# Patient Record
Sex: FEMALE | Race: BLACK OR AFRICAN AMERICAN | NOT HISPANIC OR LATINO | Employment: OTHER | RURAL
[De-identification: names, ages, dates, MRNs, and addresses within clinical notes are randomized per-mention and may not be internally consistent; named-entity substitution may affect disease eponyms.]

---

## 2020-08-21 ENCOUNTER — HISTORICAL (OUTPATIENT)
Dept: ADMINISTRATIVE | Facility: HOSPITAL | Age: 65
End: 2020-08-21

## 2020-08-22 LAB
BACTERIA #/AREA URNS HPF: ABNORMAL /HPF
BILIRUB UR QL STRIP: NEGATIVE MG/DL
CLARITY UR: ABNORMAL
COLOR UR: YELLOW
GLUCOSE UR STRIP-MCNC: >=1000 MG/DL
KETONES UR STRIP-SCNC: NEGATIVE MG/DL
LEUKOCYTE ESTERASE UR QL STRIP: NEGATIVE LEU/UL
NITRITE UR QL STRIP: NEGATIVE
PH UR STRIP: 7.5 PH UNITS (ref 5–8)
PROT UR QL STRIP: NEGATIVE MG/DL
RBC # UR STRIP: ABNORMAL ERY/UL
RBC #/AREA URNS HPF: ABNORMAL /HPF (ref 0–3)
SP GR UR STRIP: 1.02 (ref 1–1.03)
SQUAMOUS #/AREA URNS LPF: ABNORMAL /LPF
UROBILINOGEN UR STRIP-ACNC: 0.2 EU/DL
WBC #/AREA URNS HPF: ABNORMAL /HPF (ref 0–5)
YEAST #/AREA URNS HPF: ABNORMAL /HPF

## 2020-08-24 LAB
REPORT: NORMAL

## 2021-10-13 DIAGNOSIS — M25.812 OTHER SPECIFIED JOINT DISORDERS, LEFT SHOULDER: Primary | ICD-10-CM

## 2021-10-19 ENCOUNTER — CLINICAL SUPPORT (OUTPATIENT)
Dept: REHABILITATION | Facility: HOSPITAL | Age: 66
End: 2021-10-19
Payer: COMMERCIAL

## 2021-10-19 DIAGNOSIS — M25.512 LEFT SHOULDER PAIN, UNSPECIFIED CHRONICITY: Primary | ICD-10-CM

## 2021-10-19 DIAGNOSIS — M25.612 DECREASED RANGE OF MOTION OF SHOULDER, LEFT: ICD-10-CM

## 2021-10-19 DIAGNOSIS — M25.812 OTHER SPECIFIED JOINT DISORDERS, LEFT SHOULDER: ICD-10-CM

## 2021-10-19 PROCEDURE — 97110 THERAPEUTIC EXERCISES: CPT | Mod: PN

## 2021-10-19 PROCEDURE — 97161 PT EVAL LOW COMPLEX 20 MIN: CPT | Mod: PN

## 2021-10-22 ENCOUNTER — CLINICAL SUPPORT (OUTPATIENT)
Dept: REHABILITATION | Facility: HOSPITAL | Age: 66
End: 2021-10-22
Payer: COMMERCIAL

## 2021-10-22 DIAGNOSIS — M25.612 DECREASED RANGE OF MOTION OF SHOULDER, LEFT: Primary | ICD-10-CM

## 2021-10-22 PROCEDURE — 97140 MANUAL THERAPY 1/> REGIONS: CPT | Mod: PN,CQ

## 2021-10-22 PROCEDURE — 97110 THERAPEUTIC EXERCISES: CPT | Mod: PN,CQ

## 2021-10-26 ENCOUNTER — CLINICAL SUPPORT (OUTPATIENT)
Dept: REHABILITATION | Facility: HOSPITAL | Age: 66
End: 2021-10-26
Payer: COMMERCIAL

## 2021-10-26 DIAGNOSIS — M25.612 DECREASED RANGE OF MOTION OF SHOULDER, LEFT: Primary | ICD-10-CM

## 2021-10-26 PROCEDURE — 97140 MANUAL THERAPY 1/> REGIONS: CPT | Mod: PN,CQ

## 2021-10-26 PROCEDURE — 97110 THERAPEUTIC EXERCISES: CPT | Mod: PN,CQ

## 2021-11-02 ENCOUNTER — CLINICAL SUPPORT (OUTPATIENT)
Dept: REHABILITATION | Facility: HOSPITAL | Age: 66
End: 2021-11-02
Payer: COMMERCIAL

## 2021-11-02 DIAGNOSIS — M25.612 DECREASED RANGE OF MOTION OF SHOULDER, LEFT: Primary | ICD-10-CM

## 2021-11-02 PROCEDURE — 97140 MANUAL THERAPY 1/> REGIONS: CPT | Mod: PN,CQ

## 2021-11-02 PROCEDURE — 97110 THERAPEUTIC EXERCISES: CPT | Mod: PN,CQ

## 2021-11-05 ENCOUNTER — CLINICAL SUPPORT (OUTPATIENT)
Dept: REHABILITATION | Facility: HOSPITAL | Age: 66
End: 2021-11-05
Payer: COMMERCIAL

## 2021-11-05 DIAGNOSIS — M25.612 DECREASED RANGE OF MOTION OF SHOULDER, LEFT: Primary | ICD-10-CM

## 2021-11-05 PROCEDURE — 97140 MANUAL THERAPY 1/> REGIONS: CPT | Mod: KX,PN,CQ

## 2021-11-05 PROCEDURE — 97110 THERAPEUTIC EXERCISES: CPT | Mod: KX,PN,CQ

## 2021-11-09 ENCOUNTER — CLINICAL SUPPORT (OUTPATIENT)
Dept: REHABILITATION | Facility: HOSPITAL | Age: 66
End: 2021-11-09
Payer: COMMERCIAL

## 2021-11-09 DIAGNOSIS — M25.512 LEFT SHOULDER PAIN, UNSPECIFIED CHRONICITY: Primary | ICD-10-CM

## 2021-11-09 DIAGNOSIS — M25.612 DECREASED RANGE OF MOTION OF SHOULDER, LEFT: ICD-10-CM

## 2021-11-09 PROCEDURE — 97110 THERAPEUTIC EXERCISES: CPT | Mod: PN

## 2023-01-09 ENCOUNTER — OFFICE VISIT (OUTPATIENT)
Dept: FAMILY MEDICINE | Facility: CLINIC | Age: 68
End: 2023-01-09
Payer: MEDICARE

## 2023-01-09 VITALS
HEART RATE: 91 BPM | OXYGEN SATURATION: 98 % | BODY MASS INDEX: 28.04 KG/M2 | HEIGHT: 68 IN | DIASTOLIC BLOOD PRESSURE: 80 MMHG | TEMPERATURE: 98 F | SYSTOLIC BLOOD PRESSURE: 124 MMHG | WEIGHT: 185 LBS | RESPIRATION RATE: 18 BRPM

## 2023-01-09 DIAGNOSIS — J11.1 INFLUENZA: Primary | ICD-10-CM

## 2023-01-09 DIAGNOSIS — R50.9 FEVER, UNSPECIFIED FEVER CAUSE: ICD-10-CM

## 2023-01-09 LAB
CTP QC/QA: YES
CTP QC/QA: YES
FLUAV AG NPH QL: NEGATIVE
FLUAV AG NPH QL: NEGATIVE
FLUBV AG NPH QL: POSITIVE
FLUBV AG NPH QL: POSITIVE
SARS-COV-2 AG RESP QL IA.RAPID: POSITIVE

## 2023-01-09 PROCEDURE — 3074F SYST BP LT 130 MM HG: CPT | Mod: ,,, | Performed by: NURSE PRACTITIONER

## 2023-01-09 PROCEDURE — 3079F PR MOST RECENT DIASTOLIC BLOOD PRESSURE 80-89 MM HG: ICD-10-PCS | Mod: ,,, | Performed by: NURSE PRACTITIONER

## 2023-01-09 PROCEDURE — 1101F PR PT FALLS ASSESS DOC 0-1 FALLS W/OUT INJ PAST YR: ICD-10-PCS | Mod: ,,, | Performed by: NURSE PRACTITIONER

## 2023-01-09 PROCEDURE — 3008F BODY MASS INDEX DOCD: CPT | Mod: ,,, | Performed by: NURSE PRACTITIONER

## 2023-01-09 PROCEDURE — 1159F MED LIST DOCD IN RCRD: CPT | Mod: ,,, | Performed by: NURSE PRACTITIONER

## 2023-01-09 PROCEDURE — 1159F PR MEDICATION LIST DOCUMENTED IN MEDICAL RECORD: ICD-10-PCS | Mod: ,,, | Performed by: NURSE PRACTITIONER

## 2023-01-09 PROCEDURE — 3008F PR BODY MASS INDEX (BMI) DOCUMENTED: ICD-10-PCS | Mod: ,,, | Performed by: NURSE PRACTITIONER

## 2023-01-09 PROCEDURE — 3288F FALL RISK ASSESSMENT DOCD: CPT | Mod: ,,, | Performed by: NURSE PRACTITIONER

## 2023-01-09 PROCEDURE — 87804 POCT INFLUENZA A/B: ICD-10-PCS | Mod: 59,QW,, | Performed by: NURSE PRACTITIONER

## 2023-01-09 PROCEDURE — 3074F PR MOST RECENT SYSTOLIC BLOOD PRESSURE < 130 MM HG: ICD-10-PCS | Mod: ,,, | Performed by: NURSE PRACTITIONER

## 2023-01-09 PROCEDURE — 1160F PR REVIEW ALL MEDS BY PRESCRIBER/CLIN PHARMACIST DOCUMENTED: ICD-10-PCS | Mod: ,,, | Performed by: NURSE PRACTITIONER

## 2023-01-09 PROCEDURE — 3079F DIAST BP 80-89 MM HG: CPT | Mod: ,,, | Performed by: NURSE PRACTITIONER

## 2023-01-09 PROCEDURE — 3288F PR FALLS RISK ASSESSMENT DOCUMENTED: ICD-10-PCS | Mod: ,,, | Performed by: NURSE PRACTITIONER

## 2023-01-09 PROCEDURE — 99203 OFFICE O/P NEW LOW 30 MIN: CPT | Mod: ,,, | Performed by: NURSE PRACTITIONER

## 2023-01-09 PROCEDURE — 87804 INFLUENZA ASSAY W/OPTIC: CPT | Mod: 59,QW,, | Performed by: NURSE PRACTITIONER

## 2023-01-09 PROCEDURE — 99203 PR OFFICE/OUTPT VISIT, NEW, LEVL III, 30-44 MIN: ICD-10-PCS | Mod: ,,, | Performed by: NURSE PRACTITIONER

## 2023-01-09 PROCEDURE — 1101F PT FALLS ASSESS-DOCD LE1/YR: CPT | Mod: ,,, | Performed by: NURSE PRACTITIONER

## 2023-01-09 PROCEDURE — 1160F RVW MEDS BY RX/DR IN RCRD: CPT | Mod: ,,, | Performed by: NURSE PRACTITIONER

## 2023-01-09 RX ORDER — PREGABALIN 100 MG/1
100 CAPSULE ORAL DAILY
COMMUNITY
End: 2024-03-12 | Stop reason: SDUPTHER

## 2023-01-09 RX ORDER — ROSUVASTATIN CALCIUM 10 MG/1
10 TABLET, COATED ORAL NIGHTLY
COMMUNITY
End: 2024-03-25 | Stop reason: SDUPTHER

## 2023-01-09 RX ORDER — BIMATOPROST 0.1 MG/ML
1 SOLUTION/ DROPS OPHTHALMIC NIGHTLY
COMMUNITY
Start: 2022-08-17

## 2023-01-09 RX ORDER — POTASSIUM CHLORIDE 20 MEQ/1
20 TABLET, EXTENDED RELEASE ORAL DAILY
COMMUNITY
End: 2024-03-27 | Stop reason: SDUPTHER

## 2023-01-09 RX ORDER — CYCLOBENZAPRINE HCL 10 MG
10 TABLET ORAL 3 TIMES DAILY PRN
COMMUNITY

## 2023-01-09 RX ORDER — PANTOPRAZOLE SODIUM 40 MG/1
40 TABLET, DELAYED RELEASE ORAL DAILY
COMMUNITY
End: 2024-03-25 | Stop reason: SDUPTHER

## 2023-01-09 RX ORDER — LEVOTHYROXINE SODIUM 100 UG/1
100 TABLET ORAL
COMMUNITY
End: 2024-03-25 | Stop reason: SDUPTHER

## 2023-01-09 RX ORDER — ATENOLOL AND CHLORTHALIDONE TABLET 50; 25 MG/1; MG/1
1 TABLET ORAL DAILY
COMMUNITY
End: 2024-03-25

## 2023-01-09 RX ORDER — AMILORIDE HYDROCHLORIDE 5 MG/1
5 TABLET ORAL DAILY
COMMUNITY
End: 2024-03-25 | Stop reason: SDUPTHER

## 2023-01-09 RX ORDER — GLIMEPIRIDE 2 MG/1
2 TABLET ORAL EVERY MORNING
COMMUNITY
End: 2024-03-25 | Stop reason: SDUPTHER

## 2023-01-09 RX ORDER — OSELTAMIVIR PHOSPHATE 75 MG/1
75 CAPSULE ORAL DAILY
Qty: 5 CAPSULE | Refills: 0 | Status: SHIPPED | OUTPATIENT
Start: 2023-01-09 | End: 2023-01-14

## 2023-01-09 RX ORDER — VALSARTAN 160 MG/1
160 TABLET ORAL NIGHTLY
COMMUNITY
End: 2024-03-25 | Stop reason: SDUPTHER

## 2023-01-09 NOTE — PROGRESS NOTES
ROSIE Chamorro   [place] [id number]  [place]  [address]      PATIENT NAME: Amina Israel  : 1955  DATE: 23  MRN: [id number]      Billing Provider: ROSIE Chamorro  Level of Service:   Patient PCP Information       Provider PCP Type    Marquez Burch MD General            Reason for Visit / Chief Complaint: Cough, Headache, Sore Throat (All symptoms X4 days), Fever (101), and Emesis (X2 yesterday from coughing)       Update PCP  Update Chief Complaint         History of Present Illness / Problem Focused Workflow     Amina Israel presents to the clinic with Cough, Headache, Sore Throat (All symptoms X4 days), Fever (101), and Emesis (X2 yesterday from coughing)     Patient presents to clinic with c/o cough, congestion, fever, chills x 2-3 days.       Review of Systems     @Review of Systems   Constitutional:  Positive for fatigue and fever.   HENT:  Positive for nasal congestion, postnasal drip, rhinorrhea and sinus pressure/congestion. Negative for ear pain.    Respiratory:  Negative for cough.    Cardiovascular:  Negative for chest pain.   Neurological:  Positive for headaches.     Medical / Social / Family History     Past Medical History:   Diagnosis Date    Diabetes mellitus, type 2     GERD (gastroesophageal reflux disease)     History of thyroid cancer 2005    Hyperlipidemia     Hypertension     Hypothyroidism     Neuropathy     Spinal stenosis        Past Surgical History:   Procedure Laterality Date    CERVICAL DISCECTOMY  2005    C2-3    FOOT SURGERY Bilateral 2001    HYSTERECTOMY  2003    POLYPECTOMY      REDUCTION OF BOTH BREASTS Bilateral 2006    TONSILLECTOMY  1974       Social History  Ms.  reports that she has never smoked. She has never used smokeless tobacco. She reports that she does not currently use alcohol. She reports that she does not use drugs.    Family History  Ms.'s family history is not on file.    Medications and Allergies     Medications  Outpatient  Medications Marked as Taking for the 1/9/23 encounter (Office Visit) with ROSIE Chamorro   Medication Sig Dispense Refill    aMILoride (MIDAMOR) 5 MG Tab Take 5 mg by mouth once daily.      atenoloL-chlorthalidone (TENORETIC) 50-25 mg Tab Take 1 tablet by mouth once daily.      cyclobenzaprine (FLEXERIL) 10 MG tablet Take 10 mg by mouth 3 (three) times daily as needed for Muscle spasms.      estrogens, conjugated, (PREMARIN) 1.25 MG tablet Take 1.25 mg by mouth once daily.      glimepiride (AMARYL) 2 MG tablet Take 2 mg by mouth every morning.      levothyroxine (SYNTHROID) 100 MCG tablet Take 100 mcg by mouth before breakfast.      LUMIGAN 0.01 % Drop Place 1 drop into both eyes every evening.      pantoprazole (PROTONIX) 40 MG tablet Take 40 mg by mouth once daily.      potassium chloride SA (K-DUR,KLOR-CON) 20 MEQ tablet Take 20 mEq by mouth once daily.      pregabalin (LYRICA) 100 MG capsule Take 100 mg by mouth Daily.      rosuvastatin (CRESTOR) 10 MG tablet Take 10 mg by mouth every evening.      valsartan (DIOVAN) 160 MG tablet Take 160 mg by mouth every evening.         Allergies  Review of patient's allergies indicates:   Allergen Reactions    Phenergan plain Nausea And Vomiting       Physical Examination     Vitals:    01/09/23 0902   BP: 124/80   Pulse: 91   Resp: 18   Temp: 97.5 °F (36.4 °C)     Physical Exam  Constitutional:       General: She is not in acute distress.     Appearance: Normal appearance.   HENT:      Right Ear: Tympanic membrane is bulging.      Left Ear: Tympanic membrane is bulging.      Mouth/Throat:      Pharynx: Posterior oropharyngeal erythema present.   Cardiovascular:      Rate and Rhythm: Normal rate and regular rhythm.   Pulmonary:      Effort: Pulmonary effort is normal. No respiratory distress.      Breath sounds: Normal breath sounds. No wheezing, rhonchi or rales.   Skin:     General: Skin is warm and dry.   Neurological:      Mental Status: She is alert.   Psychiatric:          Mood and Affect: Mood normal.         Behavior: Behavior normal.             No results found for: WBC, HGB, HCT, MCV, PLT       No results found for: NA, K, CL, CO2, GLU, BUN, CREATININE, CALCIUM, PROT, ALBUMIN, BILITOT, ALKPHOS, AST, ALT, ANIONGAP, ESTGFRAFRICA, EGFRNONAA   No image results found.     Procedures   Assessment and Plan (including Health Maintenance)      Problem List  Smart Sets  Document Outside HM   :    Plan:           Problem List Items Addressed This Visit          ID    Influenza - Primary    Relevant Medications    oseltamivir (TAMIFLU) 75 MG capsule     Other Visit Diagnoses       Fever, unspecified fever cause        Relevant Orders    POCT SARS-COV2 (COVID) with Flu Antigen (Completed)    POCT Influenza A/B (Completed)            The patient has no Health Maintenance topics of status Not Due    No future appointments.     Health Maintenance Due   Topic Date Due    Hepatitis C Screening  Never done    Lipid Panel  Never done    COVID-19 Vaccine (1) Never done    HIV Screening  Never done    TETANUS VACCINE  Never done    Mammogram  Never done    DEXA Scan  Never done    Colorectal Cancer Screening  Never done    Shingles Vaccine (1 of 2) Never done    Pneumococcal Vaccines (Age 65+) (1 - PCV) Never done    Influenza Vaccine (1) Never done        Follow up if symptoms worsen or fail to improve.     Signature:  ROSIE Chamorro  [facility]  [address] [address] [place]  [address]    Date of encounter: 1/9/23

## 2023-01-10 PROBLEM — J11.1 INFLUENZA: Status: ACTIVE | Noted: 2023-01-10

## 2024-03-12 ENCOUNTER — OFFICE VISIT (OUTPATIENT)
Dept: FAMILY MEDICINE | Facility: CLINIC | Age: 69
End: 2024-03-12
Payer: MEDICARE

## 2024-03-12 VITALS
BODY MASS INDEX: 28.41 KG/M2 | DIASTOLIC BLOOD PRESSURE: 83 MMHG | RESPIRATION RATE: 18 BRPM | SYSTOLIC BLOOD PRESSURE: 149 MMHG | TEMPERATURE: 98 F | HEART RATE: 75 BPM | HEIGHT: 67 IN | WEIGHT: 181 LBS | OXYGEN SATURATION: 99 %

## 2024-03-12 DIAGNOSIS — I10 HYPERTENSION, UNSPECIFIED TYPE: Primary | ICD-10-CM

## 2024-03-12 DIAGNOSIS — R21 RASH: ICD-10-CM

## 2024-03-12 DIAGNOSIS — G89.29 OTHER CHRONIC PAIN: ICD-10-CM

## 2024-03-12 DIAGNOSIS — E11.9 DM (DIABETES MELLITUS): ICD-10-CM

## 2024-03-12 DIAGNOSIS — Z12.4 PAP SMEAR FOR CERVICAL CANCER SCREENING: ICD-10-CM

## 2024-03-12 LAB
ANION GAP SERPL CALCULATED.3IONS-SCNC: 10 MMOL/L (ref 7–16)
BACTERIA #/AREA URNS HPF: ABNORMAL /HPF
BILIRUB UR QL STRIP: NEGATIVE
BUN SERPL-MCNC: 17 MG/DL (ref 7–18)
BUN/CREAT SERPL: 18 (ref 6–20)
CALCIUM SERPL-MCNC: 10 MG/DL (ref 8.5–10.1)
CHLORIDE SERPL-SCNC: 101 MMOL/L (ref 98–107)
CHOLEST SERPL-MCNC: 120 MG/DL (ref 0–200)
CHOLEST/HDLC SERPL: 2.3 {RATIO}
CLARITY UR: CLEAR
CO2 SERPL-SCNC: 31 MMOL/L (ref 21–32)
COLOR UR: ABNORMAL
CREAT SERPL-MCNC: 0.97 MG/DL (ref 0.55–1.02)
CREAT UR-MCNC: 95 MG/DL (ref 28–219)
EGFR (NO RACE VARIABLE) (RUSH/TITUS): 64 ML/MIN/1.73M2
GLUCOSE SERPL-MCNC: 85 MG/DL (ref 74–106)
GLUCOSE UR STRIP-MCNC: >1000 MG/DL
HDLC SERPL-MCNC: 53 MG/DL (ref 40–60)
KETONES UR STRIP-SCNC: NEGATIVE MG/DL
LDLC SERPL CALC-MCNC: 51 MG/DL
LDLC/HDLC SERPL: 1 {RATIO}
LEUKOCYTE ESTERASE UR QL STRIP: NEGATIVE
MICROALBUMIN UR-MCNC: 2.1 MG/DL (ref 0–2.8)
MICROALBUMIN/CREAT RATIO PNL UR: 22.1 MG/G (ref 0–30)
NITRITE UR QL STRIP: NEGATIVE
NONHDLC SERPL-MCNC: 67 MG/DL
PH UR STRIP: 7 PH UNITS
POTASSIUM SERPL-SCNC: 2.8 MMOL/L (ref 3.5–5.1)
PROT UR QL STRIP: NEGATIVE
PROT UR-MCNC: 19.7 MG/DL (ref 0–11.9)
RBC # UR STRIP: ABNORMAL /UL
RBC #/AREA URNS HPF: 10 /HPF
SODIUM SERPL-SCNC: 139 MMOL/L (ref 136–145)
SP GR UR STRIP: 1.02
SQUAMOUS #/AREA URNS LPF: ABNORMAL /HPF
TRIGL SERPL-MCNC: 79 MG/DL (ref 35–150)
TSH SERPL DL<=0.005 MIU/L-ACNC: 0.72 UIU/ML (ref 0.36–3.74)
UROBILINOGEN UR STRIP-ACNC: NORMAL MG/DL
VLDLC SERPL-MCNC: 16 MG/DL
WBC #/AREA URNS HPF: <1 /HPF

## 2024-03-12 PROCEDURE — 82570 ASSAY OF URINE CREATININE: CPT | Mod: ,,, | Performed by: CLINICAL MEDICAL LABORATORY

## 2024-03-12 PROCEDURE — 1159F MED LIST DOCD IN RCRD: CPT | Mod: ,,, | Performed by: INTERNAL MEDICINE

## 2024-03-12 PROCEDURE — 3008F BODY MASS INDEX DOCD: CPT | Mod: ,,, | Performed by: INTERNAL MEDICINE

## 2024-03-12 PROCEDURE — 1101F PT FALLS ASSESS-DOCD LE1/YR: CPT | Mod: ,,, | Performed by: INTERNAL MEDICINE

## 2024-03-12 PROCEDURE — 99214 OFFICE O/P EST MOD 30 MIN: CPT | Mod: ,,, | Performed by: INTERNAL MEDICINE

## 2024-03-12 PROCEDURE — 81001 URINALYSIS AUTO W/SCOPE: CPT | Mod: ,,, | Performed by: CLINICAL MEDICAL LABORATORY

## 2024-03-12 PROCEDURE — 3288F FALL RISK ASSESSMENT DOCD: CPT | Mod: ,,, | Performed by: INTERNAL MEDICINE

## 2024-03-12 PROCEDURE — 83036 HEMOGLOBIN GLYCOSYLATED A1C: CPT | Mod: ,,, | Performed by: CLINICAL MEDICAL LABORATORY

## 2024-03-12 PROCEDURE — 1126F AMNT PAIN NOTED NONE PRSNT: CPT | Mod: ,,, | Performed by: INTERNAL MEDICINE

## 2024-03-12 PROCEDURE — 3077F SYST BP >= 140 MM HG: CPT | Mod: ,,, | Performed by: INTERNAL MEDICINE

## 2024-03-12 PROCEDURE — 80048 BASIC METABOLIC PNL TOTAL CA: CPT | Mod: ,,, | Performed by: CLINICAL MEDICAL LABORATORY

## 2024-03-12 PROCEDURE — 82043 UR ALBUMIN QUANTITATIVE: CPT | Mod: ,,, | Performed by: CLINICAL MEDICAL LABORATORY

## 2024-03-12 PROCEDURE — 3079F DIAST BP 80-89 MM HG: CPT | Mod: ,,, | Performed by: INTERNAL MEDICINE

## 2024-03-12 PROCEDURE — 3044F HG A1C LEVEL LT 7.0%: CPT | Mod: ,,, | Performed by: INTERNAL MEDICINE

## 2024-03-12 PROCEDURE — 4010F ACE/ARB THERAPY RXD/TAKEN: CPT | Mod: ,,, | Performed by: INTERNAL MEDICINE

## 2024-03-12 PROCEDURE — 3066F NEPHROPATHY DOC TX: CPT | Mod: ,,, | Performed by: INTERNAL MEDICINE

## 2024-03-12 PROCEDURE — 84443 ASSAY THYROID STIM HORMONE: CPT | Mod: ,,, | Performed by: CLINICAL MEDICAL LABORATORY

## 2024-03-12 PROCEDURE — 84156 ASSAY OF PROTEIN URINE: CPT | Mod: ,,, | Performed by: CLINICAL MEDICAL LABORATORY

## 2024-03-12 PROCEDURE — 3061F NEG MICROALBUMINURIA REV: CPT | Mod: ,,, | Performed by: INTERNAL MEDICINE

## 2024-03-12 PROCEDURE — 80061 LIPID PANEL: CPT | Mod: ,,, | Performed by: CLINICAL MEDICAL LABORATORY

## 2024-03-12 RX ORDER — OXYBUTYNIN CHLORIDE 5 MG/1
5 TABLET ORAL NIGHTLY
Qty: 60 TABLET | Refills: 2 | Status: SHIPPED | OUTPATIENT
Start: 2024-03-12

## 2024-03-12 RX ORDER — PREGABALIN 100 MG/1
100 CAPSULE ORAL 2 TIMES DAILY
Qty: 60 CAPSULE | Refills: 1 | Status: SHIPPED | OUTPATIENT
Start: 2024-03-12 | End: 2024-03-25 | Stop reason: SDUPTHER

## 2024-03-13 LAB
EST. AVERAGE GLUCOSE BLD GHB EST-MCNC: 123 MG/DL
HBA1C MFR BLD HPLC: 5.9 % (ref 4.5–6.6)

## 2024-03-14 PROBLEM — R21 RASH: Status: ACTIVE | Noted: 2024-03-14

## 2024-03-14 PROBLEM — Z12.4 PAP SMEAR FOR CERVICAL CANCER SCREENING: Status: ACTIVE | Noted: 2024-03-14

## 2024-03-14 PROBLEM — G89.29 OTHER CHRONIC PAIN: Status: ACTIVE | Noted: 2024-03-14

## 2024-03-14 PROBLEM — E11.9 DM (DIABETES MELLITUS): Status: ACTIVE | Noted: 2024-03-14

## 2024-03-14 PROBLEM — I10 HYPERTENSION: Status: ACTIVE | Noted: 2024-03-14

## 2024-03-14 NOTE — PROGRESS NOTES
Subjective:       Patient ID: Amina Israel is a 69 y.o. female.    Chief Complaint: Establish Care    HPI  .  Patient presents today to establish care she has multiple complaints of multiple medical problems she is chronic pain syndrome she is hypertension she has chronic diabetes chronic severe diabetic neuropathy and peripheral neuropathy patient has hypothyroidism also she smokes tobacco she complains of a rash to left arm she also wants a referral to pain management she also complains of urinary incontinence.Due to the many adverse health risks of smoking tobacco,  Patient has been encouraged to quit smoking, and smoking sensation treatments have been   treatments have been offered and a  smoking cessation class has been recommended to the patient      Current Medications:    Current Outpatient Medications:     aMILoride (MIDAMOR) 5 MG Tab, Take 5 mg by mouth once daily., Disp: , Rfl:     atenoloL-chlorthalidone (TENORETIC) 50-25 mg Tab, Take 1 tablet by mouth once daily., Disp: , Rfl:     cyclobenzaprine (FLEXERIL) 10 MG tablet, Take 10 mg by mouth 3 (three) times daily as needed for Muscle spasms., Disp: , Rfl:     estrogens, conjugated, (PREMARIN) 1.25 MG tablet, Take 1.25 mg by mouth once daily., Disp: , Rfl:     glimepiride (AMARYL) 2 MG tablet, Take 2 mg by mouth every morning., Disp: , Rfl:     levothyroxine (SYNTHROID) 100 MCG tablet, Take 100 mcg by mouth before breakfast., Disp: , Rfl:     LUMIGAN 0.01 % Drop, Place 1 drop into both eyes every evening., Disp: , Rfl:     pantoprazole (PROTONIX) 40 MG tablet, Take 40 mg by mouth once daily., Disp: , Rfl:     potassium chloride SA (K-DUR,KLOR-CON) 20 MEQ tablet, Take 20 mEq by mouth once daily., Disp: , Rfl:     rosuvastatin (CRESTOR) 10 MG tablet, Take 10 mg by mouth every evening., Disp: , Rfl:     valsartan (DIOVAN) 160 MG tablet, Take 160 mg by mouth every evening., Disp: , Rfl:     oxybutynin (DITROPAN) 5 MG Tab, Take 1 tablet (5 mg total) by mouth  "every evening., Disp: 60 tablet, Rfl: 2    pregabalin (LYRICA) 100 MG capsule, Take 1 capsule (100 mg total) by mouth 2 (two) times daily., Disp: 60 capsule, Rfl: 1           Review of Systems             Vitals:    03/12/24 1342 03/12/24 1343   BP: (!) 145/84 (!) 149/83   BP Location: Right arm    Patient Position: Sitting    BP Method: Large (Automatic)    Pulse: 75    Resp: 18    Temp: 97.8 °F (36.6 °C)    TempSrc: Tympanic    SpO2: 99%    Weight: 82.1 kg (181 lb)    Height: 5' 7" (1.702 m)         Physical Exam  Vitals and nursing note reviewed.   Constitutional:       Appearance: Normal appearance.   Cardiovascular:      Rate and Rhythm: Normal rate and regular rhythm.      Pulses: Normal pulses.      Heart sounds: Normal heart sounds.   Pulmonary:      Effort: Pulmonary effort is normal.      Breath sounds: Normal breath sounds.   Abdominal:      General: Abdomen is flat. Bowel sounds are normal.      Palpations: Abdomen is soft.   Musculoskeletal:         General: Normal range of motion.   Skin:     General: Skin is warm and dry.   Neurological:      General: No focal deficit present.      Mental Status: She is alert and oriented to person, place, and time. Mental status is at baseline.           Last Labs:     Office Visit on 03/12/2024   Component Date Value    Color, UA 03/12/2024 Light-Yellow     Clarity, UA 03/12/2024 Clear     pH, UA 03/12/2024 7.0     Leukocytes, UA 03/12/2024 Negative     Nitrites, UA 03/12/2024 Negative     Protein, UA 03/12/2024 Negative     Glucose, UA 03/12/2024 >1000 (A)     Ketones, UA 03/12/2024 Negative     Urobilinogen, UA 03/12/2024 Normal     Bilirubin, UA 03/12/2024 Negative     Blood, UA 03/12/2024 Trace (A)     Specific [place], UA 03/12/2024 1.025     Sodium 03/12/2024 139     Potassium 03/12/2024 2.8 (L)     Chloride 03/12/2024 101     CO2 03/12/2024 31     Anion Gap 03/12/2024 10     Glucose 03/12/2024 85     BUN 03/12/2024 17     Creatinine 03/12/2024 0.97     " BUN/Creatinine Ratio 03/12/2024 18     Calcium 03/12/2024 10.0     eGFR 03/12/2024 64     TSH 03/12/2024 0.723     Protein, Urine 03/12/2024 19.7 (H)     Creatinine, Urine 03/12/2024 95     Microalbumin 03/12/2024 2.1     Microalbumin/Creatinine * 03/12/2024 22.1     Hemoglobin A1C 03/12/2024 5.9     Estimated Average Glucose 03/12/2024 123     Triglycerides 03/12/2024 79     Cholesterol 03/12/2024 120     HDL Cholesterol 03/12/2024 53     Cholesterol/HDL Ratio (R* 03/12/2024 2.3     Non-HDL 03/12/2024 67     LDL Calculated 03/12/2024 51     LDL/HDL 03/12/2024 1.0     VLDL 03/12/2024 16     WBC, UA 03/12/2024 <1     RBC, UA 03/12/2024 10 (H)     Bacteria, UA 03/12/2024 Occ (A)     Squamous Epithelial Cell* 03/12/2024 Occasional (A)        Last Imaging:  No image results found.         **Labs and x-rays personally reviewed by me    ** reviewed      Objective:        Assessment:       1. Hypertension, unspecified type  Basic Metabolic Panel    TSH    Protein, Random Urine    Microalbumin/Creatinine Ratio, Urine    Hemoglobin A1C    Lipid Panel    Urinalysis, Reflex to Urine Culture    Basic Metabolic Panel    TSH    Protein, Random Urine    Microalbumin/Creatinine Ratio, Urine    Hemoglobin A1C    Lipid Panel    Urinalysis, Microscopic      2. DM (diabetes mellitus)  Hemoglobin A1C    Hemoglobin A1C      3. Rash  Ambulatory referral/consult to Dermatology      4. Other chronic pain  Ambulatory referral/consult to Pain Clinic      5. Pap smear for cervical cancer screening  Ambulatory referral/consult to Gynecology           Plan:         1. Hypertension, unspecified type  -     Basic Metabolic Panel; Future; Expected date: 03/12/2024  -     TSH; Future; Expected date: 03/12/2024  -     Protein, Random Urine; Future; Expected date: 03/12/2024  -     Microalbumin/Creatinine Ratio, Urine; Future; Expected date: 03/12/2024  -     Hemoglobin A1C; Future; Expected date: 03/12/2024  -     Lipid Panel; Future; Expected  date: 03/12/2024  -     Urinalysis, Reflex to Urine Culture  -     Urinalysis, Microscopic    2. DM (diabetes mellitus)  -     Hemoglobin A1C; Future; Expected date: 03/12/2024    3. Rash  -     Ambulatory referral/consult to Dermatology; Future; Expected date: 03/19/2024    4. Other chronic pain  -     Ambulatory referral/consult to Pain Clinic; Future; Expected date: 03/19/2024    5. Pap smear for cervical cancer screening  -     Ambulatory referral/consult to Gynecology; Future; Expected date: 03/19/2024    Other orders  -     oxybutynin (DITROPAN) 5 MG Tab; Take 1 tablet (5 mg total) by mouth every evening.  Dispense: 60 tablet; Refill: 2  -     pregabalin (LYRICA) 100 MG capsule; Take 1 capsule (100 mg total) by mouth 2 (two) times daily.  Dispense: 60 capsule; Refill: 1

## 2024-03-19 ENCOUNTER — TELEPHONE (OUTPATIENT)
Dept: FAMILY MEDICINE | Facility: CLINIC | Age: 69
End: 2024-03-19
Payer: MEDICARE

## 2024-03-19 NOTE — TELEPHONE ENCOUNTER
----- Message from Obed Durant MD sent at 3/14/2024 11:12 AM CDT -----  Need to see in  1 week please  abnl results     1027 Pt is scheduled to come in on 03/25/24

## 2024-03-25 ENCOUNTER — OFFICE VISIT (OUTPATIENT)
Dept: FAMILY MEDICINE | Facility: CLINIC | Age: 69
End: 2024-03-25
Payer: MEDICARE

## 2024-03-25 VITALS
OXYGEN SATURATION: 98 % | TEMPERATURE: 97 F | BODY MASS INDEX: 33.49 KG/M2 | WEIGHT: 182 LBS | DIASTOLIC BLOOD PRESSURE: 83 MMHG | HEIGHT: 62 IN | RESPIRATION RATE: 18 BRPM | SYSTOLIC BLOOD PRESSURE: 128 MMHG | HEART RATE: 78 BPM

## 2024-03-25 DIAGNOSIS — R80.9 PROTEINURIA, UNSPECIFIED TYPE: ICD-10-CM

## 2024-03-25 DIAGNOSIS — Z12.31 ENCOUNTER FOR SCREENING MAMMOGRAM FOR MALIGNANT NEOPLASM OF BREAST: ICD-10-CM

## 2024-03-25 DIAGNOSIS — E11.9 TYPE 2 DIABETES MELLITUS WITHOUT COMPLICATION, UNSPECIFIED WHETHER LONG TERM INSULIN USE: ICD-10-CM

## 2024-03-25 DIAGNOSIS — Z12.39 ENCOUNTER FOR SCREENING FOR MALIGNANT NEOPLASM OF BREAST, UNSPECIFIED SCREENING MODALITY: ICD-10-CM

## 2024-03-25 DIAGNOSIS — Z78.0 POSTMENOPAUSE: ICD-10-CM

## 2024-03-25 DIAGNOSIS — Z12.11 ENCOUNTER FOR SCREENING COLONOSCOPY: ICD-10-CM

## 2024-03-25 DIAGNOSIS — E87.6 HYPOKALEMIA: Primary | ICD-10-CM

## 2024-03-25 LAB — POTASSIUM SERPL-SCNC: 3.3 MMOL/L (ref 3.5–5.1)

## 2024-03-25 PROCEDURE — 3044F HG A1C LEVEL LT 7.0%: CPT | Mod: ,,, | Performed by: INTERNAL MEDICINE

## 2024-03-25 PROCEDURE — 3066F NEPHROPATHY DOC TX: CPT | Mod: ,,, | Performed by: INTERNAL MEDICINE

## 2024-03-25 PROCEDURE — 1125F AMNT PAIN NOTED PAIN PRSNT: CPT | Mod: ,,, | Performed by: INTERNAL MEDICINE

## 2024-03-25 PROCEDURE — 99214 OFFICE O/P EST MOD 30 MIN: CPT | Mod: ,,, | Performed by: INTERNAL MEDICINE

## 2024-03-25 PROCEDURE — 4010F ACE/ARB THERAPY RXD/TAKEN: CPT | Mod: ,,, | Performed by: INTERNAL MEDICINE

## 2024-03-25 PROCEDURE — 3288F FALL RISK ASSESSMENT DOCD: CPT | Mod: ,,, | Performed by: INTERNAL MEDICINE

## 2024-03-25 PROCEDURE — 3079F DIAST BP 80-89 MM HG: CPT | Mod: ,,, | Performed by: INTERNAL MEDICINE

## 2024-03-25 PROCEDURE — 3008F BODY MASS INDEX DOCD: CPT | Mod: ,,, | Performed by: INTERNAL MEDICINE

## 2024-03-25 PROCEDURE — 3061F NEG MICROALBUMINURIA REV: CPT | Mod: ,,, | Performed by: INTERNAL MEDICINE

## 2024-03-25 PROCEDURE — 1101F PT FALLS ASSESS-DOCD LE1/YR: CPT | Mod: ,,, | Performed by: INTERNAL MEDICINE

## 2024-03-25 PROCEDURE — 1159F MED LIST DOCD IN RCRD: CPT | Mod: ,,, | Performed by: INTERNAL MEDICINE

## 2024-03-25 PROCEDURE — 84132 ASSAY OF SERUM POTASSIUM: CPT | Mod: ,,, | Performed by: CLINICAL MEDICAL LABORATORY

## 2024-03-25 PROCEDURE — 3074F SYST BP LT 130 MM HG: CPT | Mod: ,,, | Performed by: INTERNAL MEDICINE

## 2024-03-25 RX ORDER — FLUCONAZOLE 150 MG/1
150 TABLET ORAL
COMMUNITY
Start: 2024-03-15 | End: 2024-03-25 | Stop reason: SDUPTHER

## 2024-03-25 RX ORDER — EMPAGLIFLOZIN 25 MG/1
25 TABLET, FILM COATED ORAL
COMMUNITY
Start: 2024-02-23 | End: 2024-03-25

## 2024-03-25 RX ORDER — ATENOLOL AND CHLORTHALIDONE TABLET 50; 25 MG/1; MG/1
1 TABLET ORAL DAILY
Qty: 90 TABLET | Refills: 1 | Status: CANCELLED | OUTPATIENT
Start: 2024-03-25

## 2024-03-27 DIAGNOSIS — E87.6 HYPOKALEMIA: Primary | ICD-10-CM

## 2024-03-27 PROBLEM — R80.9 PROTEINURIA: Status: ACTIVE | Noted: 2024-03-27

## 2024-03-27 PROBLEM — Z12.11 ENCOUNTER FOR SCREENING COLONOSCOPY: Status: ACTIVE | Noted: 2024-03-14

## 2024-03-27 PROBLEM — Z12.39 ENCOUNTER FOR SCREENING FOR MALIGNANT NEOPLASM OF BREAST: Status: ACTIVE | Noted: 2024-03-14

## 2024-03-27 RX ORDER — FLUCONAZOLE 200 MG/1
200 TABLET ORAL DAILY
Qty: 7 TABLET | Refills: 1 | Status: SHIPPED | OUTPATIENT
Start: 2024-03-27 | End: 2024-04-17

## 2024-03-27 RX ORDER — PANTOPRAZOLE SODIUM 40 MG/1
40 TABLET, DELAYED RELEASE ORAL DAILY
Qty: 90 TABLET | Refills: 1 | Status: SHIPPED | OUTPATIENT
Start: 2024-03-27

## 2024-03-27 RX ORDER — POTASSIUM CHLORIDE 20 MEQ/1
20 TABLET, EXTENDED RELEASE ORAL 3 TIMES DAILY
Qty: 6 TABLET | Refills: 0 | Status: SHIPPED | OUTPATIENT
Start: 2024-03-27

## 2024-03-27 RX ORDER — SULFAMETHOXAZOLE AND TRIMETHOPRIM 800; 160 MG/1; MG/1
1 TABLET ORAL 2 TIMES DAILY
Qty: 14 TABLET | Refills: 1 | Status: SHIPPED | OUTPATIENT
Start: 2024-03-27

## 2024-03-27 RX ORDER — VALSARTAN 160 MG/1
160 TABLET ORAL NIGHTLY
Qty: 90 TABLET | Refills: 1 | Status: SHIPPED | OUTPATIENT
Start: 2024-03-27

## 2024-03-27 RX ORDER — GLIMEPIRIDE 2 MG/1
2 TABLET ORAL EVERY MORNING
Qty: 90 TABLET | Refills: 1 | Status: SHIPPED | OUTPATIENT
Start: 2024-03-27 | End: 2024-04-22 | Stop reason: SDUPTHER

## 2024-03-27 RX ORDER — AMILORIDE HYDROCHLORIDE 5 MG/1
5 TABLET ORAL DAILY
Qty: 30 TABLET | Refills: 1 | Status: SHIPPED | OUTPATIENT
Start: 2024-03-27

## 2024-03-27 RX ORDER — ROSUVASTATIN CALCIUM 10 MG/1
10 TABLET, COATED ORAL NIGHTLY
Qty: 90 TABLET | Refills: 1 | Status: SHIPPED | OUTPATIENT
Start: 2024-03-27 | End: 2024-04-22 | Stop reason: SDUPTHER

## 2024-03-27 RX ORDER — LEVOTHYROXINE SODIUM 100 UG/1
100 TABLET ORAL
Qty: 30 TABLET | Refills: 1 | Status: SHIPPED | OUTPATIENT
Start: 2024-03-27

## 2024-03-27 RX ORDER — PREGABALIN 100 MG/1
100 CAPSULE ORAL 2 TIMES DAILY
Qty: 60 CAPSULE | Refills: 1 | Status: SHIPPED | OUTPATIENT
Start: 2024-03-27

## 2024-03-27 RX ORDER — ATENOLOL 25 MG/1
75 TABLET ORAL DAILY
Qty: 30 TABLET | Refills: 3 | Status: SHIPPED | OUTPATIENT
Start: 2024-03-27 | End: 2024-04-30

## 2024-03-27 NOTE — TELEPHONE ENCOUNTER
----- Message from Obed Durant MD sent at 3/26/2024  3:33 PM CDT -----  KCl 20 mEq 1 p.o. 3 times a day x2 days.    Potassium level 1 week, please    0811 Call made to pt regarding above message; no answer; left voicemail

## 2024-03-28 NOTE — PROGRESS NOTES
Subjective:       Patient ID: Amina Israel is a 69 y.o. female.    Chief Complaint: Results, Medication Refill, and Hip Pain    HPI  .  Patient presents with chronic type 2 diabetes   Proteinuria   Also hypokalemia.  Also patient complains of vaginitis.  Because of the proteinuria/potential kidney issues I am going to DC the Tenoretic.  Blood pressure is stable but since I am going to DC the Tenoretic I am going to increase the atenolol to 75 mg 1 p.o. q.day. patient also complain of boil to the perirectal area.  This area was examined in presence of a female medical assistant.  Does have some tenderness will start Bactrim for the surgery.  Possible ID    Current Medications:    Current Outpatient Medications:     cyclobenzaprine (FLEXERIL) 10 MG tablet, Take 10 mg by mouth 3 (three) times daily as needed for Muscle spasms., Disp: , Rfl:     estrogens, conjugated, (PREMARIN) 1.25 MG tablet, Take 1.25 mg by mouth once daily., Disp: , Rfl:     LUMIGAN 0.01 % Drop, Place 1 drop into both eyes every evening., Disp: , Rfl:     oxybutynin (DITROPAN) 5 MG Tab, Take 1 tablet (5 mg total) by mouth every evening., Disp: 60 tablet, Rfl: 2    aMILoride (MIDAMOR) 5 MG Tab, Take 1 tablet (5 mg total) by mouth once daily., Disp: 30 tablet, Rfl: 1    atenoloL (TENORMIN) 25 MG tablet, Take 3 tablets (75 mg total) by mouth once daily., Disp: 30 tablet, Rfl: 3    fluconazole (DIFLUCAN) 200 MG Tab, Take 1 tablet (200 mg total) by mouth once daily., Disp: 7 tablet, Rfl: 1    glimepiride (AMARYL) 2 MG tablet, Take 1 tablet (2 mg total) by mouth every morning., Disp: 90 tablet, Rfl: 1    levothyroxine (SYNTHROID) 100 MCG tablet, Take 1 tablet (100 mcg total) by mouth before breakfast., Disp: 30 tablet, Rfl: 1    pantoprazole (PROTONIX) 40 MG tablet, Take 1 tablet (40 mg total) by mouth once daily., Disp: 90 tablet, Rfl: 1    potassium chloride SA (K-DUR,KLOR-CON) 20 MEQ tablet, Take 1 tablet (20 mEq total) by mouth 3 (three) times daily.,  "Disp: 6 tablet, Rfl: 0    pregabalin (LYRICA) 100 MG capsule, Take 1 capsule (100 mg total) by mouth 2 (two) times daily., Disp: 60 capsule, Rfl: 1    rosuvastatin (CRESTOR) 10 MG tablet, Take 1 tablet (10 mg total) by mouth every evening., Disp: 90 tablet, Rfl: 1    sulfamethoxazole-trimethoprim 800-160mg (BACTRIM DS) 800-160 mg Tab, Take 1 tablet by mouth 2 (two) times daily., Disp: 14 tablet, Rfl: 1    valsartan (DIOVAN) 160 MG tablet, Take 1 tablet (160 mg total) by mouth every evening., Disp: 90 tablet, Rfl: 1           ROS  Twelve point system reviewed, unremarkable except for stated above in HPI.        Objective:         Vitals:    03/25/24 1414 03/25/24 1451   BP: (!) 141/85 128/83   BP Location: Left arm    Patient Position: Sitting    BP Method: Large (Automatic)    Pulse: 78    Resp: 18    Temp: 97.1 °F (36.2 °C)    TempSrc: Temporal    SpO2: 98%    Weight: 82.6 kg (182 lb)    Height: 5' 2" (1.575 m)         Physical Exam     Patient is awake alert oriented person place and  Lungs are clear to auscultation bilaterally no crackles or wheezes   Cardiovascular S1-S2 regular rate and rhythm no murmurs rubs or gallops   Abdomen is soft positive bowel sounds nontender, extremities no clubbing cyanosis edema  Neuro no focal neurological deficits  Skin warm and dry.     Last Labs:     Office Visit on 03/25/2024   Component Date Value    Potassium 03/25/2024 3.3 (L)    Office Visit on 03/12/2024   Component Date Value    Color, UA 03/12/2024 Light-Yellow     Clarity, UA 03/12/2024 Clear     pH, UA 03/12/2024 7.0     Leukocytes, UA 03/12/2024 Negative     Nitrites, UA 03/12/2024 Negative     Protein, UA 03/12/2024 Negative     Glucose, UA 03/12/2024 >1000 (A)     Ketones, UA 03/12/2024 Negative     Urobilinogen, UA 03/12/2024 Normal     Bilirubin, UA 03/12/2024 Negative     Blood, UA 03/12/2024 Trace (A)     Specific [place], UA 03/12/2024 1.025     Sodium 03/12/2024 139     Potassium 03/12/2024 2.8 (L)     " Chloride 03/12/2024 101     CO2 03/12/2024 31     Anion Gap 03/12/2024 10     Glucose 03/12/2024 85     BUN 03/12/2024 17     Creatinine 03/12/2024 0.97     BUN/Creatinine Ratio 03/12/2024 18     Calcium 03/12/2024 10.0     eGFR 03/12/2024 64     TSH 03/12/2024 0.723     Protein, Urine 03/12/2024 19.7 (H)     Creatinine, Urine 03/12/2024 95     Microalbumin 03/12/2024 2.1     Microalbumin/Creatinine * 03/12/2024 22.1     Hemoglobin A1C 03/12/2024 5.9     Estimated Average Glucose 03/12/2024 123     Triglycerides 03/12/2024 79     Cholesterol 03/12/2024 120     HDL Cholesterol 03/12/2024 53     Cholesterol/HDL Ratio (R* 03/12/2024 2.3     Non-HDL 03/12/2024 67     LDL Calculated 03/12/2024 51     LDL/HDL 03/12/2024 1.0     VLDL 03/12/2024 16     WBC, UA 03/12/2024 <1     RBC, UA 03/12/2024 10 (H)     Bacteria, UA 03/12/2024 Occ (A)     Squamous Epithelial Cell* 03/12/2024 Occasional (A)        Last Imaging:  No image results found.         **Labs and x-rays personally reviewed by me    ** reviewed           Assessment & Plan:       1. Hypokalemia  -     Potassium; Future; Expected date: 04/01/2024    2. Type 2 diabetes mellitus without complication, unspecified whether long term insulin use  -     Ambulatory referral/consult to Diabetic Advanced Practice Providers (Medical Management); Future; Expected date: 04/01/2024    3. Encounter for screening colonoscopy  -     Colonoscopy; Future; Expected date: 03/25/2024    4. Encounter for screening for malignant neoplasm of breast, unspecified screening modality  -     Mammo Digital Screening Bilat w/ Lisandro; Future; Expected date: 03/25/2024    5. Postmenopause  -     DXA Bone Density Axial Skeleton 1 or more sites; Future; Expected date: 03/25/2024    6. Encounter for screening mammogram for malignant neoplasm of breast  -     Mammo Digital Screening Bilat w/ Lisandro; Future; Expected date: 03/25/2024    7. Proteinuria, unspecified type  -     Ambulatory referral/consult  to Nephrology; Future; Expected date: 04/01/2024    Other orders  -     aMILoride (MIDAMOR) 5 MG Tab; Take 1 tablet (5 mg total) by mouth once daily.  Dispense: 30 tablet; Refill: 1  -     glimepiride (AMARYL) 2 MG tablet; Take 1 tablet (2 mg total) by mouth every morning.  Dispense: 90 tablet; Refill: 1  -     levothyroxine (SYNTHROID) 100 MCG tablet; Take 1 tablet (100 mcg total) by mouth before breakfast.  Dispense: 30 tablet; Refill: 1  -     pantoprazole (PROTONIX) 40 MG tablet; Take 1 tablet (40 mg total) by mouth once daily.  Dispense: 90 tablet; Refill: 1  -     pregabalin (LYRICA) 100 MG capsule; Take 1 capsule (100 mg total) by mouth 2 (two) times daily.  Dispense: 60 capsule; Refill: 1  -     rosuvastatin (CRESTOR) 10 MG tablet; Take 1 tablet (10 mg total) by mouth every evening.  Dispense: 90 tablet; Refill: 1  -     valsartan (DIOVAN) 160 MG tablet; Take 1 tablet (160 mg total) by mouth every evening.  Dispense: 90 tablet; Refill: 1  -     sulfamethoxazole-trimethoprim 800-160mg (BACTRIM DS) 800-160 mg Tab; Take 1 tablet by mouth 2 (two) times daily.  Dispense: 14 tablet; Refill: 1  -     fluconazole (DIFLUCAN) 200 MG Tab; Take 1 tablet (200 mg total) by mouth once daily.  Dispense: 7 tablet; Refill: 1  -     atenoloL (TENORMIN) 25 MG tablet; Take 3 tablets (75 mg total) by mouth once daily.  Dispense: 30 tablet; Refill: 3            Obed Durant MD

## 2024-04-17 RX ORDER — FLUCONAZOLE 200 MG/1
200 TABLET ORAL
Qty: 7 TABLET | Refills: 1 | Status: SHIPPED | OUTPATIENT
Start: 2024-04-17

## 2024-04-22 ENCOUNTER — OFFICE VISIT (OUTPATIENT)
Dept: FAMILY MEDICINE | Facility: CLINIC | Age: 69
End: 2024-04-22
Payer: MEDICARE

## 2024-04-22 VITALS
BODY MASS INDEX: 34.23 KG/M2 | OXYGEN SATURATION: 99 % | HEIGHT: 62 IN | RESPIRATION RATE: 18 BRPM | HEART RATE: 60 BPM | DIASTOLIC BLOOD PRESSURE: 80 MMHG | TEMPERATURE: 98 F | SYSTOLIC BLOOD PRESSURE: 155 MMHG | WEIGHT: 186 LBS

## 2024-04-22 DIAGNOSIS — E87.6 HYPOKALEMIA: ICD-10-CM

## 2024-04-22 DIAGNOSIS — E11.9 TYPE 2 DIABETES MELLITUS WITHOUT COMPLICATION, UNSPECIFIED WHETHER LONG TERM INSULIN USE: Primary | ICD-10-CM

## 2024-04-22 DIAGNOSIS — E78.5 DYSLIPIDEMIA: ICD-10-CM

## 2024-04-22 LAB
ANION GAP SERPL CALCULATED.3IONS-SCNC: 9 MMOL/L (ref 7–16)
BUN SERPL-MCNC: 17 MG/DL (ref 7–18)
BUN/CREAT SERPL: 20 (ref 6–20)
CALCIUM SERPL-MCNC: 10 MG/DL (ref 8.5–10.1)
CHLORIDE SERPL-SCNC: 108 MMOL/L (ref 98–107)
CO2 SERPL-SCNC: 27 MMOL/L (ref 21–32)
CREAT SERPL-MCNC: 0.87 MG/DL (ref 0.55–1.02)
EGFR (NO RACE VARIABLE) (RUSH/TITUS): 73 ML/MIN/1.73M2
GLUCOSE SERPL-MCNC: 66 MG/DL (ref 74–106)
POTASSIUM SERPL-SCNC: 4.2 MMOL/L (ref 3.5–5.1)
SODIUM SERPL-SCNC: 140 MMOL/L (ref 136–145)

## 2024-04-22 PROCEDURE — 1159F MED LIST DOCD IN RCRD: CPT | Mod: ,,, | Performed by: INTERNAL MEDICINE

## 2024-04-22 PROCEDURE — 3066F NEPHROPATHY DOC TX: CPT | Mod: ,,, | Performed by: INTERNAL MEDICINE

## 2024-04-22 PROCEDURE — 80048 BASIC METABOLIC PNL TOTAL CA: CPT | Mod: ,,, | Performed by: CLINICAL MEDICAL LABORATORY

## 2024-04-22 PROCEDURE — 3288F FALL RISK ASSESSMENT DOCD: CPT | Mod: ,,, | Performed by: INTERNAL MEDICINE

## 2024-04-22 PROCEDURE — 1101F PT FALLS ASSESS-DOCD LE1/YR: CPT | Mod: ,,, | Performed by: INTERNAL MEDICINE

## 2024-04-22 PROCEDURE — 99214 OFFICE O/P EST MOD 30 MIN: CPT | Mod: ,,, | Performed by: INTERNAL MEDICINE

## 2024-04-22 PROCEDURE — 4010F ACE/ARB THERAPY RXD/TAKEN: CPT | Mod: ,,, | Performed by: INTERNAL MEDICINE

## 2024-04-22 PROCEDURE — 1126F AMNT PAIN NOTED NONE PRSNT: CPT | Mod: ,,, | Performed by: INTERNAL MEDICINE

## 2024-04-22 PROCEDURE — 3008F BODY MASS INDEX DOCD: CPT | Mod: ,,, | Performed by: INTERNAL MEDICINE

## 2024-04-22 PROCEDURE — 3044F HG A1C LEVEL LT 7.0%: CPT | Mod: ,,, | Performed by: INTERNAL MEDICINE

## 2024-04-22 PROCEDURE — 3077F SYST BP >= 140 MM HG: CPT | Mod: ,,, | Performed by: INTERNAL MEDICINE

## 2024-04-22 PROCEDURE — 3079F DIAST BP 80-89 MM HG: CPT | Mod: ,,, | Performed by: INTERNAL MEDICINE

## 2024-04-22 PROCEDURE — 3061F NEG MICROALBUMINURIA REV: CPT | Mod: ,,, | Performed by: INTERNAL MEDICINE

## 2024-04-22 RX ORDER — ROSUVASTATIN CALCIUM 10 MG/1
10 TABLET, COATED ORAL NIGHTLY
Qty: 90 TABLET | Refills: 1 | Status: SHIPPED | OUTPATIENT
Start: 2024-04-22

## 2024-04-22 RX ORDER — GLIMEPIRIDE 2 MG/1
2 TABLET ORAL EVERY MORNING
Qty: 90 TABLET | Refills: 1 | Status: SHIPPED | OUTPATIENT
Start: 2024-04-22

## 2024-04-30 PROBLEM — E78.5 DYSLIPIDEMIA: Status: ACTIVE | Noted: 2024-04-30

## 2024-04-30 RX ORDER — ATENOLOL 25 MG/1
75 TABLET ORAL
Qty: 90 TABLET | Refills: 3 | Status: SHIPPED | OUTPATIENT
Start: 2024-04-30

## 2024-04-30 NOTE — PROGRESS NOTES
Subjective:       Patient ID: Amina Israel is a 69 y.o. female.    Chief Complaint: Follow-up (1 month fu hypokalemia)    HPI  .  Patient seen evaluated patient has chronic diabetes mellitus patient hypokalemia patient also has chronic dyslipidemia.  Current Medications:    Current Outpatient Medications:     aMILoride (MIDAMOR) 5 MG Tab, Take 1 tablet (5 mg total) by mouth once daily., Disp: 30 tablet, Rfl: 1    atenoloL (TENORMIN) 25 MG tablet, Take 3 tablets (75 mg total) by mouth once daily., Disp: 30 tablet, Rfl: 3    cyclobenzaprine (FLEXERIL) 10 MG tablet, Take 10 mg by mouth 3 (three) times daily as needed for Muscle spasms., Disp: , Rfl:     estrogens, conjugated, (PREMARIN) 1.25 MG tablet, Take 1.25 mg by mouth once daily., Disp: , Rfl:     fluconazole (DIFLUCAN) 200 MG Tab, TAKE ONE TABLET BY MOUTH EVERY DAY, Disp: 7 tablet, Rfl: 1    levothyroxine (SYNTHROID) 100 MCG tablet, Take 1 tablet (100 mcg total) by mouth before breakfast., Disp: 30 tablet, Rfl: 1    LUMIGAN 0.01 % Drop, Place 1 drop into both eyes every evening., Disp: , Rfl:     oxybutynin (DITROPAN) 5 MG Tab, Take 1 tablet (5 mg total) by mouth every evening., Disp: 60 tablet, Rfl: 2    pantoprazole (PROTONIX) 40 MG tablet, Take 1 tablet (40 mg total) by mouth once daily., Disp: 90 tablet, Rfl: 1    potassium chloride SA (K-DUR,KLOR-CON) 20 MEQ tablet, Take 1 tablet (20 mEq total) by mouth 3 (three) times daily., Disp: 6 tablet, Rfl: 0    pregabalin (LYRICA) 100 MG capsule, Take 1 capsule (100 mg total) by mouth 2 (two) times daily., Disp: 60 capsule, Rfl: 1    sulfamethoxazole-trimethoprim 800-160mg (BACTRIM DS) 800-160 mg Tab, Take 1 tablet by mouth 2 (two) times daily., Disp: 14 tablet, Rfl: 1    valsartan (DIOVAN) 160 MG tablet, Take 1 tablet (160 mg total) by mouth every evening., Disp: 90 tablet, Rfl: 1    glimepiride (AMARYL) 2 MG tablet, Take 1 tablet (2 mg total) by mouth every morning., Disp: 90 tablet, Rfl: 1    rosuvastatin  "(CRESTOR) 10 MG tablet, Take 1 tablet (10 mg total) by mouth every evening., Disp: 90 tablet, Rfl: 1           ROS  Twelve point system reviewed, unremarkable except for stated above in HPI.        Objective:         Vitals:    04/22/24 1334 04/22/24 1335   BP: (!) 150/84 (!) 155/80   BP Location: Right arm Left arm   Patient Position: Sitting Sitting   BP Method: Large (Automatic) Large (Automatic)   Pulse: 60    Resp: 18    Temp: 97.7 °F (36.5 °C)    TempSrc: Tympanic    SpO2: 99%    Weight: 84.4 kg (186 lb)    Height: 5' 2" (1.575 m)         Physical Exam     Patient is awake alert oriented person place and  Lungs are clear to auscultation bilaterally no crackles or wheezes   Cardiovascular S1-S2 regular rate and rhythm no murmurs rubs or gallops   Abdomen is soft positive bowel sounds nontender, extremities no clubbing cyanosis edema  Neuro no focal neurological deficits  Skin warm and dry.     Last Labs:     Office Visit on 04/22/2024   Component Date Value    Sodium 04/22/2024 140     Potassium 04/22/2024 4.2     Chloride 04/22/2024 108 (H)     CO2 04/22/2024 27     Anion Gap 04/22/2024 9     Glucose 04/22/2024 66 (L)     BUN 04/22/2024 17     Creatinine 04/22/2024 0.87     BUN/Creatinine Ratio 04/22/2024 20     Calcium 04/22/2024 10.0     eGFR 04/22/2024 73    Office Visit on 03/25/2024   Component Date Value    Potassium 03/25/2024 3.3 (L)        Last Imaging:  No image results found.         **Labs and x-rays personally reviewed by me    ** reviewed           Assessment & Plan:       1. Type 2 diabetes mellitus without complication, unspecified whether long term insulin use  -     Basic Metabolic Panel; Future; Expected date: 04/22/2024  -     Ambulatory referral/consult to Ophthalmology; Future; Expected date: 04/29/2024  -     Ambulatory referral/consult to Podiatry; Future; Expected date: 04/29/2024    2. Hypokalemia  -     Cancel: Potassium    3. Dyslipidemia    Other orders  -     glimepiride " (AMARYL) 2 MG tablet; Take 1 tablet (2 mg total) by mouth every morning.  Dispense: 90 tablet; Refill: 1  -     rosuvastatin (CRESTOR) 10 MG tablet; Take 1 tablet (10 mg total) by mouth every evening.  Dispense: 90 tablet; Refill: 1            Obed Durant MD

## 2024-06-04 ENCOUNTER — OFFICE VISIT (OUTPATIENT)
Dept: OBSTETRICS AND GYNECOLOGY | Facility: CLINIC | Age: 69
End: 2024-06-04
Payer: MEDICARE

## 2024-06-04 VITALS
SYSTOLIC BLOOD PRESSURE: 164 MMHG | RESPIRATION RATE: 18 BRPM | OXYGEN SATURATION: 97 % | DIASTOLIC BLOOD PRESSURE: 88 MMHG | WEIGHT: 183.38 LBS | TEMPERATURE: 98 F | BODY MASS INDEX: 28.78 KG/M2 | HEART RATE: 93 BPM | HEIGHT: 67 IN

## 2024-06-04 DIAGNOSIS — I10 HYPERTENSION, UNSPECIFIED TYPE: ICD-10-CM

## 2024-06-04 DIAGNOSIS — N95.2 VAGINITIS, ATROPHIC: ICD-10-CM

## 2024-06-04 DIAGNOSIS — E11.9 TYPE 2 DIABETES MELLITUS WITHOUT COMPLICATION, WITHOUT LONG-TERM CURRENT USE OF INSULIN: ICD-10-CM

## 2024-06-04 DIAGNOSIS — E87.6 HYPOKALEMIA: ICD-10-CM

## 2024-06-04 DIAGNOSIS — Z12.39 ENCOUNTER FOR SCREENING FOR MALIGNANT NEOPLASM OF BREAST, UNSPECIFIED SCREENING MODALITY: Primary | ICD-10-CM

## 2024-06-04 DIAGNOSIS — Z12.4 PAP SMEAR FOR CERVICAL CANCER SCREENING: ICD-10-CM

## 2024-06-04 DIAGNOSIS — Z85.850 HISTORY OF THYROID CANCER: ICD-10-CM

## 2024-06-04 DIAGNOSIS — E78.5 DYSLIPIDEMIA: ICD-10-CM

## 2024-06-04 DIAGNOSIS — N32.81 OAB (OVERACTIVE BLADDER): ICD-10-CM

## 2024-06-04 DIAGNOSIS — E55.9 VITAMIN D DEFICIENCY: ICD-10-CM

## 2024-06-04 LAB
BACTERIA #/AREA URNS HPF: ABNORMAL /HPF
BILIRUB UR QL STRIP: NEGATIVE
CLARITY UR: CLEAR
COLOR UR: ABNORMAL
GLUCOSE UR STRIP-MCNC: NORMAL MG/DL
KETONES UR STRIP-SCNC: NEGATIVE MG/DL
LEUKOCYTE ESTERASE UR QL STRIP: NEGATIVE
MUCOUS, UA: ABNORMAL /LPF
NITRITE UR QL STRIP: NEGATIVE
PH UR STRIP: 7 PH UNITS
PROT UR QL STRIP: 10
RBC # UR STRIP: ABNORMAL /UL
RBC #/AREA URNS HPF: 9 /HPF
SP GR UR STRIP: 1.02
SQUAMOUS #/AREA URNS LPF: ABNORMAL /HPF
UROBILINOGEN UR STRIP-ACNC: NORMAL MG/DL
WBC #/AREA URNS HPF: 1 /HPF

## 2024-06-04 PROCEDURE — 3044F HG A1C LEVEL LT 7.0%: CPT | Mod: CPTII,,, | Performed by: OBSTETRICS & GYNECOLOGY

## 2024-06-04 PROCEDURE — 99999PBSHW PR PBB SHADOW TECHNICAL ONLY FILED TO HB: Mod: PBBFAC,,,

## 2024-06-04 PROCEDURE — 82270 OCCULT BLOOD FECES: CPT | Mod: PBBFAC | Performed by: OBSTETRICS & GYNECOLOGY

## 2024-06-04 PROCEDURE — 88142 CYTOPATH C/V THIN LAYER: CPT | Mod: TC,GCY | Performed by: OBSTETRICS & GYNECOLOGY

## 2024-06-04 PROCEDURE — 81001 URINALYSIS AUTO W/SCOPE: CPT | Mod: ,,, | Performed by: CLINICAL MEDICAL LABORATORY

## 2024-06-04 PROCEDURE — 3008F BODY MASS INDEX DOCD: CPT | Mod: CPTII,,, | Performed by: OBSTETRICS & GYNECOLOGY

## 2024-06-04 PROCEDURE — 1160F RVW MEDS BY RX/DR IN RCRD: CPT | Mod: CPTII,,, | Performed by: OBSTETRICS & GYNECOLOGY

## 2024-06-04 PROCEDURE — 99215 OFFICE O/P EST HI 40 MIN: CPT | Mod: PBBFAC | Performed by: OBSTETRICS & GYNECOLOGY

## 2024-06-04 PROCEDURE — 4010F ACE/ARB THERAPY RXD/TAKEN: CPT | Mod: CPTII,,, | Performed by: OBSTETRICS & GYNECOLOGY

## 2024-06-04 PROCEDURE — 3077F SYST BP >= 140 MM HG: CPT | Mod: CPTII,,, | Performed by: OBSTETRICS & GYNECOLOGY

## 2024-06-04 PROCEDURE — 3061F NEG MICROALBUMINURIA REV: CPT | Mod: CPTII,,, | Performed by: OBSTETRICS & GYNECOLOGY

## 2024-06-04 PROCEDURE — 1126F AMNT PAIN NOTED NONE PRSNT: CPT | Mod: CPTII,,, | Performed by: OBSTETRICS & GYNECOLOGY

## 2024-06-04 PROCEDURE — 1159F MED LIST DOCD IN RCRD: CPT | Mod: CPTII,,, | Performed by: OBSTETRICS & GYNECOLOGY

## 2024-06-04 PROCEDURE — 99459 PELVIC EXAMINATION: CPT | Mod: S$PBB,,, | Performed by: OBSTETRICS & GYNECOLOGY

## 2024-06-04 PROCEDURE — 99205 OFFICE O/P NEW HI 60 MIN: CPT | Mod: S$PBB,,, | Performed by: OBSTETRICS & GYNECOLOGY

## 2024-06-04 PROCEDURE — 99459 PELVIC EXAMINATION: CPT | Mod: PBBFAC | Performed by: OBSTETRICS & GYNECOLOGY

## 2024-06-04 PROCEDURE — 3066F NEPHROPATHY DOC TX: CPT | Mod: CPTII,,, | Performed by: OBSTETRICS & GYNECOLOGY

## 2024-06-04 PROCEDURE — 3079F DIAST BP 80-89 MM HG: CPT | Mod: CPTII,,, | Performed by: OBSTETRICS & GYNECOLOGY

## 2024-06-04 RX ORDER — ESTRADIOL 0.1 MG/G
1 CREAM VAGINAL
Qty: 42.5 G | Refills: 1 | Status: SHIPPED | OUTPATIENT
Start: 2024-06-06 | End: 2025-06-06

## 2024-06-04 NOTE — PROGRESS NOTES
"Amina Israel female  for   Chief Complaint   Patient presents with    Annual Exam     Patient is here to get her pap smear done, as per patient she is not having any pelvic or abdominal pain.     Some urgency and frequency    Intermittent left lower quadrant pain      OB History    No obstetric history on file.          PHI:  Patient presents upon referral from Dr. Obed Durant.  He has placed her on oxybutynin for overactive bladder issues.  She states she is doing very well.  She has occasional left lower quadrant pain.  Her history indicates that she has not sexually active.  She is 69 years of age  1, para 1.  Patient gives a history of a total abdominal hysterectomy in .    She denies any vaginal bleeding.  She denies any rectal bleeding.  She denies any prolapse symptomatology.      Past Medical History:   Diagnosis Date    Diabetes mellitus, type 2     GERD (gastroesophageal reflux disease)     History of thyroid cancer     Hyperlipidemia     Hypertension     Hypothyroidism     Neuropathy     Spinal stenosis       Past Surgical History:   Procedure Laterality Date    CERVICAL DISCECTOMY      C2-3    FOOT SURGERY Bilateral     HYSTERECTOMY      POLYPECTOMY      REDUCTION OF BOTH BREASTS Bilateral 2006    TONSILLECTOMY  1974      Review of patient's allergies indicates:   Allergen Reactions    Metformin Diarrhea    Phenergan plain Nausea And Vomiting        ROS:Pertinent items are noted in HPI.    Physical exam:This gyn related exam was chaperoned by a female medical assistant.      BP (!) 164/88 (BP Location: Left arm, Patient Position: Sitting)   Pulse 93   Temp 98 °F (36.7 °C)   Resp 18   Ht 5' 7" (1.702 m)   Wt 83.2 kg (183 lb 6.4 oz)   SpO2 97%   BMI 28.72 kg/m²      General Appearance: healthy, alert, no distress, smiling, short statured and slightly overweight with a BMI of 28.72               HEENT: Head: Normocephalic, no lesions, without obvious abnormality.    Neuro: " normal exam    Lymphatic: no palpable lymphadenopathy, no hepatosplenomegaly    Chest:            Breast: normal appearance, no masses or tenderness, Inspection negative, No nipple retraction or dimpling, No nipple discharge or bleeding, No axillary or supraclavicular adenopathy, Normal to palpation without dominant masses            Lung: chest clear, no wheezing, rales, normal symmetric air entry            Heart: regular rate and rhythm, S1, S2 normal, no murmur, click, rub or gallop    Abdomen:  Patient's abdomen is soft; bowel sounds normal; there is no ascites; there is no abdominal bruit; moderate obesity; hypogastric midline incision without ventral hernia  Pelvic:            Vulva: Normal vulva: no lesions, masses, epithelial changes, or exudate           Vagina: normal mucosa, no discharge, atrophic, no urethrocele and no loss of urine with coughing           Uterus: Uterus / cervix surgically absent           Adnexae: no mass, fullness, tenderness and adnexae not palpable    Rectal: negative, stool guaiac negative    Extremity: normal    Skin: normal exam        Assessment:   Problem List Items Addressed This Visit          Cardiac/Vascular    Hypertension    Dyslipidemia       Renal/    Hypokalemia       Oncology    RESOLVED: History of thyroid cancer       Endocrine    DM (diabetes mellitus)     Other Visit Diagnoses       OAB (overactive bladder)    -  Primary    Pap smear for cervical cancer screening        Vaginitis, atrophic        Vitamin D deficiency                 Plan:  Thin prep Pap; hemoccult screen was negative today; CBC; vitamin-D; urinalysis and urine screen for culture-reflex urine culture              Recommend interval colonoscopy; recommend interval mammography              Continue on oxybutynin for overactive bladder issues              Initiate estrogen topically applied with the vagina for atrophic vaginitis-estradiol vaginal cream twice weekly on indefinite basis               Doctor Morris recommends vitamin D3 over-the-counter at 4000 units daily                Follow-up in 3 months

## 2024-06-04 NOTE — PATIENT INSTRUCTIONS
Dr. Ye Morris thanks you for this office visit at [facility].    Diagnosis for this visit:   Problem List Items Addressed This Visit          Cardiac/Vascular    Hypertension    Dyslipidemia       Renal/    Hypokalemia       Oncology    RESOLVED: History of thyroid cancer       Endocrine    DM (diabetes mellitus)     Other Visit Diagnoses       OAB (overactive bladder)    -  Primary    Pap smear for cervical cancer screening        Vaginitis, atrophic                 The Plan: Thin prep Pap; hemoccult screen was negative today; CBC; vitamin-D; urinalysis and urine screen for culture-reflex urine culture              Recommend interval colonoscopy; recommend interval mammography              Continue on oxybutynin for overactive bladder issues              Initiate estrogen topically applied with the vagina for atrophic vaginitis-estradiol vaginal cream twice weekly on indefinite basis              Doctor Alexandra recommends vitamin D3 over-the-counter at 4000 units daily                Follow-up in 3 months        Please practice best food and exercise habits for your age.    Dr. Ye Morris recommends avoidance of smoking and illicit medications or habits.    Please call  or schedule for any change in your health.     Please keep the next scheduled appointment or call for any need to change the appointment.     Dr. Ye Morris recommends yearly exams for good health maintenance.      Thank you    Dr. Ye Morris  06/04/2024 2:25 PM

## 2024-06-06 LAB
GH SERPL-MCNC: NORMAL NG/ML
INSULIN SERPL-ACNC: NORMAL U[IU]/ML
LAB AP CLINICAL INFORMATION: NORMAL
LAB AP GYN INTERPRETATION: NEGATIVE
LAB AP PAP DISCLAIMER COMMENTS: NORMAL
RENIN PLAS-CCNC: NORMAL NG/ML/H

## 2024-06-25 NOTE — PROGRESS NOTES
Subjective:         Patient ID: Amina Israel is a 69 y.o. female.    Chief Complaint: Shoulder Pain, Hip Pain, and Low-back Pain      Pain  This is a chronic problem. The current episode started more than 1 year ago. The problem occurs daily. The problem has been waxing and waning. Associated symptoms include arthralgias. Pertinent negatives include no anorexia, change in bowel habit, chest pain, chills, coughing, diaphoresis, fever, neck pain, rash, sore throat, swollen glands, urinary symptoms, vertigo or vomiting.     Review of Systems   Constitutional:  Negative for activity change, appetite change, chills, diaphoresis, fever and unexpected weight change.   HENT:  Negative for drooling, ear discharge, ear pain, facial swelling, nosebleeds, sore throat, trouble swallowing, voice change and goiter.    Eyes:  Negative for photophobia, pain, discharge, redness and visual disturbance.   Respiratory:  Negative for apnea, cough, choking, chest tightness, shortness of breath, wheezing and stridor.    Cardiovascular:  Negative for chest pain, palpitations and leg swelling.   Gastrointestinal:  Negative for abdominal distention, anorexia, change in bowel habit, diarrhea, rectal pain, vomiting and fecal incontinence.   Endocrine: Negative for cold intolerance, heat intolerance, polydipsia, polyphagia and polyuria.   Genitourinary:  Negative for bladder incontinence, dysuria, flank pain, frequency and hot flashes.   Musculoskeletal:  Positive for arthralgias, back pain and leg pain. Negative for neck pain.   Integumentary:  Negative for color change, pallor and rash.   Allergic/Immunologic: Negative for immunocompromised state.   Neurological:  Negative for dizziness, vertigo, seizures, syncope, facial asymmetry, speech difficulty, light-headedness, memory loss and coordination difficulties.   Hematological:  Negative for adenopathy. Does not bruise/bleed easily.   Psychiatric/Behavioral:  Negative for agitation,  "behavioral problems, confusion, decreased concentration, dysphoric mood, hallucinations, self-injury and suicidal ideas. The patient is not nervous/anxious and is not hyperactive.            Past Medical History:   Diagnosis Date    Diabetes mellitus, type 2     GERD (gastroesophageal reflux disease)     History of thyroid cancer 2005    Hyperlipidemia     Hypertension     Hypothyroidism     Neuropathy     Spinal stenosis      Past Surgical History:   Procedure Laterality Date    CERVICAL DISCECTOMY  2005    C2-3    FOOT SURGERY Bilateral 2001    HYSTERECTOMY  2003    POLYPECTOMY      REDUCTION OF BOTH BREASTS Bilateral 2006    TONSILLECTOMY  1974     Social History     Socioeconomic History    Marital status:    Tobacco Use    Smoking status: Never    Smokeless tobacco: Never   Substance and Sexual Activity    Alcohol use: Not Currently    Drug use: Never    Sexual activity: Not Currently     No family history on file.  Review of patient's allergies indicates:   Allergen Reactions    Metformin Diarrhea    Phenergan plain Nausea And Vomiting        Objective:  Vitals:    07/02/24 1252   BP: 138/76   Pulse: 70   Resp: 18   Weight: 79.8 kg (176 lb)   Height: 5' 7" (1.702 m)   PainSc: 0-No pain         Physical Exam  Vitals and nursing note reviewed. Exam conducted with a chaperone present.   Constitutional:       General: She is awake. She is not in acute distress.     Appearance: Normal appearance. She is not ill-appearing, toxic-appearing or diaphoretic.   HENT:      Head: Normocephalic and atraumatic.      Nose: Nose normal.      Mouth/Throat:      Mouth: Mucous membranes are moist.      Pharynx: Oropharynx is clear.   Eyes:      Conjunctiva/sclera: Conjunctivae normal.      Pupils: Pupils are equal, round, and reactive to light.   Cardiovascular:      Rate and Rhythm: Normal rate.   Pulmonary:      Effort: Pulmonary effort is normal. No respiratory distress.   Abdominal:      Palpations: Abdomen is soft.    "   Tenderness: There is no guarding.   Musculoskeletal:         General: Normal range of motion.      Cervical back: Normal range of motion and neck supple. No rigidity.   Skin:     General: Skin is warm and dry.      Coloration: Skin is not jaundiced or pale.   Neurological:      General: No focal deficit present.      Mental Status: She is alert and oriented to person, place, and time. Mental status is at baseline.      Cranial Nerves: No cranial nerve deficit (II-XII).   Psychiatric:         Mood and Affect: Mood normal.         Behavior: Behavior normal. Behavior is cooperative.         Thought Content: Thought content normal.           No image results found.       Lab Visit on 06/04/2024   Component Date Value Ref Range Status    Vitamin D 25-Hydroxy, Blood 06/04/2024 26.6  ng/mL Final    WBC 06/04/2024 11.33 (H)  4.50 - 11.00 K/uL Final    RBC 06/04/2024 4.26  4.20 - 5.40 M/uL Final    Hemoglobin 06/04/2024 12.9  12.0 - 16.0 g/dL Final    Hematocrit 06/04/2024 39.4  38.0 - 47.0 % Final    MCV 06/04/2024 92.5  80.0 - 96.0 fL Final    MCH 06/04/2024 30.3  27.0 - 31.0 pg Final    MCHC 06/04/2024 32.7  32.0 - 36.0 g/dL Final    RDW 06/04/2024 13.6  11.5 - 14.5 % Final    Platelet Count 06/04/2024 200  150 - 400 K/uL Final    MPV 06/04/2024 10.4  9.4 - 12.4 fL Final    Neutrophils % 06/04/2024 70.5 (H)  53.0 - 65.0 % Final    Lymphocytes % 06/04/2024 21.4 (L)  27.0 - 41.0 % Final    Monocytes % 06/04/2024 6.8 (H)  2.0 - 6.0 % Final    Eosinophils % 06/04/2024 0.6 (L)  1.0 - 4.0 % Final    Basophils % 06/04/2024 0.5  0.0 - 1.0 % Final    Immature Granulocytes % 06/04/2024 0.2  0.0 - 0.4 % Final    nRBC, Auto 06/04/2024 0.0  <=0.0 % Final    Neutrophils, Abs 06/04/2024 7.98 (H)  1.80 - 7.70 K/uL Final    Lymphocytes, Absolute 06/04/2024 2.43  1.00 - 4.80 K/uL Final    Monocytes, Absolute 06/04/2024 0.77  0.00 - 0.80 K/uL Final    Eosinophils, Absolute 06/04/2024 0.07  0.00 - 0.50 K/uL Final    Basophils, Absolute  06/04/2024 0.06  0.00 - 0.20 K/uL Final    Immature Granulocytes, Absolute 06/04/2024 0.02  0.00 - 0.04 K/uL Final    nRBC, Absolute 06/04/2024 0.00  <=0.00 x10e3/uL Final    Diff Type 06/04/2024 Auto   Final   Office Visit on 06/04/2024   Component Date Value Ref Range Status    Case Report 06/04/2024    Final                    Value:Pap Cytology                                      Case: [id number]                                   Authorizing Provider:  Ye Morris MD        Collected:           06/04/2024 02:48 PM          Ordering Location:     [facility] Received:            06/05/2024 09:03 AM                                 - Obstetrics And                                                                                    Gynecology                                                                   First Screen:          Temitope Quintero CT(ASCP)                                                   Specimen:    Liquid-Based Pap Test, Screening, Vagina                                                   Interpretation 06/04/2024 Negative              Final    General Categorization 06/04/2024 Negative for intraepithelial lesion or malignancy   Final    Specimen Adequacy 06/04/2024 Satisfactory for evaluation   Final    Clinical Information 06/04/2024    Final                    Value:This result contains rich text formatting which cannot be displayed here.    Disclaimer 06/04/2024    Final                    Value:This result contains rich text formatting which cannot be displayed here.    Color, UA 06/04/2024 Light-Yellow  Colorless, Straw, Yellow, Light Yellow, Dark Yellow Final    Clarity, UA 06/04/2024 Clear  Clear Final    pH, UA 06/04/2024 7.0  5.0 to 8.0 pH Units Final    Leukocytes, UA 06/04/2024 Negative  Negative Final    Nitrites, UA 06/04/2024 Negative  Negative Final    Protein, UA 06/04/2024 10 (A)  Negative Final    Glucose, UA 06/04/2024 Normal  Normal mg/dL Final    Ketones,  UA 06/04/2024 Negative  Negative mg/dL Final    Urobilinogen, UA 06/04/2024 Normal  0.2, 1.0, Normal mg/dL Final    Bilirubin, UA 06/04/2024 Negative  Negative Final    Blood, UA 06/04/2024 Small (A)  Negative Final    Specific Gravity, UA 06/04/2024 1.023  <=1.030 Final    WBC, UA 06/04/2024 1  <=5 /hpf Final    RBC, UA 06/04/2024 9 (H)  <=3 /hpf Final    Bacteria, UA 06/04/2024 Occasional (A)  None Seen /hpf Final    Squamous Epithelial Cells, UA 06/04/2024 Occasional (A)  None Seen /HPF Final    Mucous 06/04/2024 Occasional (A)  None Seen /LPF Final   Office Visit on 04/22/2024   Component Date Value Ref Range Status    Sodium 04/22/2024 140  136 - 145 mmol/L Final    Potassium 04/22/2024 4.2  3.5 - 5.1 mmol/L Final    Chloride 04/22/2024 108 (H)  98 - 107 mmol/L Final    CO2 04/22/2024 27  21 - 32 mmol/L Final    Anion Gap 04/22/2024 9  7 - 16 mmol/L Final    Glucose 04/22/2024 66 (L)  74 - 106 mg/dL Final    BUN 04/22/2024 17  7 - 18 mg/dL Final    Creatinine 04/22/2024 0.87  0.55 - 1.02 mg/dL Final    BUN/Creatinine Ratio 04/22/2024 20  6 - 20 Final    Calcium 04/22/2024 10.0  8.5 - 10.1 mg/dL Final    eGFR 04/22/2024 73  >=60 mL/min/1.73m2 Final   Office Visit on 03/25/2024   Component Date Value Ref Range Status    Potassium 03/25/2024 3.3 (L)  3.5 - 5.1 mmol/L Final   Office Visit on 03/12/2024   Component Date Value Ref Range Status    Color, UA 03/12/2024 Light-Yellow  Colorless, Straw, Yellow, Light Yellow, Dark Yellow Final    Clarity, UA 03/12/2024 Clear  Clear Final    pH, UA 03/12/2024 7.0  5.0 to 8.0 pH Units Final    Leukocytes, UA 03/12/2024 Negative  Negative Final    Nitrites, UA 03/12/2024 Negative  Negative Final    Protein, UA 03/12/2024 Negative  Negative Final    Glucose, UA 03/12/2024 >1000 (A)  Normal mg/dL Final    Ketones, UA 03/12/2024 Negative  Negative mg/dL Final    Urobilinogen, UA 03/12/2024 Normal  0.2, 1.0, Normal mg/dL Final    Bilirubin, UA 03/12/2024 Negative  Negative Final     Blood, UA 03/12/2024 Trace (A)  Negative Final    Specific Gravity, UA 03/12/2024 1.025  <=1.030 Final    Sodium 03/12/2024 139  136 - 145 mmol/L Final    Potassium 03/12/2024 2.8 (L)  3.5 - 5.1 mmol/L Final    Chloride 03/12/2024 101  98 - 107 mmol/L Final    CO2 03/12/2024 31  21 - 32 mmol/L Final    Anion Gap 03/12/2024 10  7 - 16 mmol/L Final    Glucose 03/12/2024 85  74 - 106 mg/dL Final    BUN 03/12/2024 17  7 - 18 mg/dL Final    Creatinine 03/12/2024 0.97  0.55 - 1.02 mg/dL Final    BUN/Creatinine Ratio 03/12/2024 18  6 - 20 Final    Calcium 03/12/2024 10.0  8.5 - 10.1 mg/dL Final    eGFR 03/12/2024 64  >=60 mL/min/1.73m2 Final    TSH 03/12/2024 0.723  0.358 - 3.740 uIU/mL Final    Protein, Urine 03/12/2024 19.7 (H)  0.0 - 11.9 mg/dL Final    Creatinine, Urine 03/12/2024 95  28 - 219 mg/dL Final    Microalbumin 03/12/2024 2.1  0.0 - 2.8 mg/dL Final    Microalbumin/Creatinine Ratio 03/12/2024 22.1  0.0 - 30.0 mg/g Final    Hemoglobin A1C 03/12/2024 5.9  4.5 - 6.6 % Final    Estimated Average Glucose 03/12/2024 123  mg/dL Final    Triglycerides 03/12/2024 79  35 - 150 mg/dL Final    Cholesterol 03/12/2024 120  0 - 200 mg/dL Final    HDL Cholesterol 03/12/2024 53  40 - 60 mg/dL Final    Cholesterol/HDL Ratio (Risk Factor) 03/12/2024 2.3   Final    Non-HDL 03/12/2024 67  mg/dL Final    LDL Calculated 03/12/2024 51  mg/dL Final    LDL/HDL 03/12/2024 1.0   Final    VLDL 03/12/2024 16  mg/dL Final    WBC, UA 03/12/2024 <1  <=5 /hpf Final    RBC, UA 03/12/2024 10 (H)  <=3 /hpf Final    Bacteria, UA 03/12/2024 Occ (A)  None Seen /hpf Final    Squamous Epithelial Cells, UA 03/12/2024 Occasional (A)  None Seen /HPF Final         Orders Placed This Encounter   Procedures    Drug Screen Definitive 14, Urine     Standing Status:   Future     Standing Expiration Date:   8/31/2025     Order Specific Question:   Specimen Source     Answer:   Urine    POCT Urine Drug Screen Presump     Interpretive Information:      Negative:  No drug detected at the cut off level.   Positive:  This result represents presumptive positive for the   tested drug, other substances may yield a positive response other   than the analyte of interest. This result should be utilized for   diagnostic purpose only. Confirmation testing will be performed upon physician request only.          Requested Prescriptions     Signed Prescriptions Disp Refills    pregabalin (LYRICA) 150 MG capsule 90 capsule 2     Sig: Take 1 capsule (150 mg total) by mouth 3 (three) times daily.       Assessment:     1. Polyarthralgia    2. Other chronic pain    3. Cervical spondylosis without myelopathy    4. Encounter for long-term (current) use of other medications         A's of Opioid Risk Assessment  Activity:Patient can perform ADL.   Analgesia:Patients pain is partially controlled by current medication. Patient has tried OTC medications such as Tylenol and Ibuprofen with out relief.   Adverse Effects: Patient denies constipation or sedation.  Aberrant Behavior:  reviewed with no aberrant drug seeking/taking behavior.  Overdose reversal drug naloxone discussed    Drug screen reviewed      Plan:    New patient     History anterior cervical fusion 2005 Dr Felipe Harden total Pain Treatment 2007 cervical epidural steroid injection  Patient declines any further epidural steroid injection due to complications after procedure    Dr. Bhargav Jiang, Lyrica      Physical therapy notes epic November 9, 2021    History left shoulder pain, lumbar radiculopathy    Labs through June 4, 2024 reviewed  Vitamin-D level 26.6    No x-ray/MRI lumbar spine for review    Declines narcotics due to side effects    Presumptive drug screen positive for THC     Order definitive drug screen for clarification      Presents clinic today complaining of upper lower extremity anterior-posterior multiple areas of joint pain muscle pain ongoing for many years getting worse with time she  describes a dull achy pain worse with increased activity worse with long periods of inactivity better with short periods resting or changing positions     She denies loss of bowel or bladder function     Denies difficulty ambulating     Neurologically intact     After discussing options    She is requesting to resume Lyrica 3 times a day     Lyrica 150 mg 1 p.o. q.8 hours     She would like to continue her cyclobenzaprine as needed     Declines physical therapy due to expense     Continue home exercise program as directed ongoing greater than 1 year    Follow-up 3 months,    Dr. Velasco      Bring original prescription medication bottles/container/box with labels to each visit    Greater than 30 minutes spent on this encounter including 10 minutes reviewing imaging and notes, 15 minutes with the patient, 5 minutes documentation

## 2024-07-02 ENCOUNTER — OFFICE VISIT (OUTPATIENT)
Dept: PAIN MEDICINE | Facility: CLINIC | Age: 69
End: 2024-07-02
Payer: MEDICARE

## 2024-07-02 VITALS
HEIGHT: 67 IN | WEIGHT: 176 LBS | BODY MASS INDEX: 27.62 KG/M2 | DIASTOLIC BLOOD PRESSURE: 76 MMHG | SYSTOLIC BLOOD PRESSURE: 138 MMHG | HEART RATE: 70 BPM | RESPIRATION RATE: 18 BRPM

## 2024-07-02 DIAGNOSIS — M47.812 CERVICAL SPONDYLOSIS WITHOUT MYELOPATHY: Chronic | ICD-10-CM

## 2024-07-02 DIAGNOSIS — M25.50 POLYARTHRALGIA: Primary | Chronic | ICD-10-CM

## 2024-07-02 DIAGNOSIS — G89.29 OTHER CHRONIC PAIN: ICD-10-CM

## 2024-07-02 DIAGNOSIS — Z79.899 ENCOUNTER FOR LONG-TERM (CURRENT) USE OF OTHER MEDICATIONS: ICD-10-CM

## 2024-07-02 LAB

## 2024-07-02 PROCEDURE — 3078F DIAST BP <80 MM HG: CPT | Mod: CPTII,,, | Performed by: PHYSICIAN ASSISTANT

## 2024-07-02 PROCEDURE — 99204 OFFICE O/P NEW MOD 45 MIN: CPT | Mod: S$PBB,,, | Performed by: PHYSICIAN ASSISTANT

## 2024-07-02 PROCEDURE — 3044F HG A1C LEVEL LT 7.0%: CPT | Mod: CPTII,,, | Performed by: PHYSICIAN ASSISTANT

## 2024-07-02 PROCEDURE — 1126F AMNT PAIN NOTED NONE PRSNT: CPT | Mod: CPTII,,, | Performed by: PHYSICIAN ASSISTANT

## 2024-07-02 PROCEDURE — 3075F SYST BP GE 130 - 139MM HG: CPT | Mod: CPTII,,, | Performed by: PHYSICIAN ASSISTANT

## 2024-07-02 PROCEDURE — 3288F FALL RISK ASSESSMENT DOCD: CPT | Mod: CPTII,,, | Performed by: PHYSICIAN ASSISTANT

## 2024-07-02 PROCEDURE — 99999 PR PBB SHADOW E&M-EST. PATIENT-LVL V: CPT | Mod: PBBFAC,,, | Performed by: PHYSICIAN ASSISTANT

## 2024-07-02 PROCEDURE — 3008F BODY MASS INDEX DOCD: CPT | Mod: CPTII,,, | Performed by: PHYSICIAN ASSISTANT

## 2024-07-02 PROCEDURE — 1159F MED LIST DOCD IN RCRD: CPT | Mod: CPTII,,, | Performed by: PHYSICIAN ASSISTANT

## 2024-07-02 PROCEDURE — 80305 DRUG TEST PRSMV DIR OPT OBS: CPT | Mod: PBBFAC | Performed by: PHYSICIAN ASSISTANT

## 2024-07-02 PROCEDURE — 99215 OFFICE O/P EST HI 40 MIN: CPT | Mod: PBBFAC | Performed by: PHYSICIAN ASSISTANT

## 2024-07-02 PROCEDURE — 3066F NEPHROPATHY DOC TX: CPT | Mod: CPTII,,, | Performed by: PHYSICIAN ASSISTANT

## 2024-07-02 PROCEDURE — 4010F ACE/ARB THERAPY RXD/TAKEN: CPT | Mod: CPTII,,, | Performed by: PHYSICIAN ASSISTANT

## 2024-07-02 PROCEDURE — 3061F NEG MICROALBUMINURIA REV: CPT | Mod: CPTII,,, | Performed by: PHYSICIAN ASSISTANT

## 2024-07-02 PROCEDURE — 1100F PTFALLS ASSESS-DOCD GE2>/YR: CPT | Mod: CPTII,,, | Performed by: PHYSICIAN ASSISTANT

## 2024-07-02 PROCEDURE — 99999PBSHW POCT URINE DRUG SCREEN PRESUMP: Mod: PBBFAC,,,

## 2024-07-02 RX ORDER — PREGABALIN 150 MG/1
150 CAPSULE ORAL 3 TIMES DAILY
Qty: 90 CAPSULE | Refills: 2 | Status: SHIPPED | OUTPATIENT
Start: 2024-07-02

## 2024-07-18 RX ORDER — ATENOLOL 25 MG/1
75 TABLET ORAL
Qty: 90 TABLET | Refills: 3 | Status: SHIPPED | OUTPATIENT
Start: 2024-07-18 | End: 2024-07-23 | Stop reason: SDUPTHER

## 2024-07-23 ENCOUNTER — APPOINTMENT (OUTPATIENT)
Dept: RADIOLOGY | Facility: CLINIC | Age: 69
End: 2024-07-23
Attending: INTERNAL MEDICINE
Payer: MEDICARE

## 2024-07-23 ENCOUNTER — OFFICE VISIT (OUTPATIENT)
Dept: FAMILY MEDICINE | Facility: CLINIC | Age: 69
End: 2024-07-23
Payer: MEDICARE

## 2024-07-23 VITALS
SYSTOLIC BLOOD PRESSURE: 146 MMHG | TEMPERATURE: 98 F | WEIGHT: 176 LBS | DIASTOLIC BLOOD PRESSURE: 87 MMHG | OXYGEN SATURATION: 97 % | RESPIRATION RATE: 18 BRPM | HEART RATE: 69 BPM | BODY MASS INDEX: 27.62 KG/M2 | HEIGHT: 67 IN

## 2024-07-23 DIAGNOSIS — M54.50 LUMBAR PAIN: ICD-10-CM

## 2024-07-23 DIAGNOSIS — Z11.59 NEED FOR HEPATITIS C SCREENING TEST: ICD-10-CM

## 2024-07-23 DIAGNOSIS — E11.9 TYPE 2 DIABETES MELLITUS WITHOUT COMPLICATION, UNSPECIFIED WHETHER LONG TERM INSULIN USE: ICD-10-CM
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
## 2024-07-23 DIAGNOSIS — G89.29 OTHER CHRONIC PAIN: ICD-10-CM

## 2024-07-23 LAB
EST. AVERAGE GLUCOSE BLD GHB EST-MCNC: 111 MG/DL
HBA1C MFR BLD HPLC: 5.5 % (ref 4.5–6.6)
HCV AB SER QL: NORMAL

## 2024-07-23 PROCEDURE — 99214 OFFICE O/P EST MOD 30 MIN: CPT | Mod: ,,, | Performed by: INTERNAL MEDICINE

## 2024-07-23 PROCEDURE — 4010F ACE/ARB THERAPY RXD/TAKEN: CPT | Mod: ,,, | Performed by: INTERNAL MEDICINE

## 2024-07-23 PROCEDURE — 1159F MED LIST DOCD IN RCRD: CPT | Mod: ,,, | Performed by: INTERNAL MEDICINE

## 2024-07-23 PROCEDURE — 3061F NEG MICROALBUMINURIA REV: CPT | Mod: ,,, | Performed by: INTERNAL MEDICINE

## 2024-07-23 PROCEDURE — 83036 HEMOGLOBIN GLYCOSYLATED A1C: CPT | Mod: ,,, | Performed by: CLINICAL MEDICAL LABORATORY

## 2024-07-23 PROCEDURE — 3288F FALL RISK ASSESSMENT DOCD: CPT | Mod: ,,, | Performed by: INTERNAL MEDICINE

## 2024-07-23 PROCEDURE — 3077F SYST BP >= 140 MM HG: CPT | Mod: ,,, | Performed by: INTERNAL MEDICINE

## 2024-07-23 PROCEDURE — 3008F BODY MASS INDEX DOCD: CPT | Mod: ,,, | Performed by: INTERNAL MEDICINE

## 2024-07-23 PROCEDURE — 1101F PT FALLS ASSESS-DOCD LE1/YR: CPT | Mod: ,,, | Performed by: INTERNAL MEDICINE

## 2024-07-23 PROCEDURE — 3079F DIAST BP 80-89 MM HG: CPT | Mod: ,,, | Performed by: INTERNAL MEDICINE

## 2024-07-23 PROCEDURE — 72100 X-RAY EXAM L-S SPINE 2/3 VWS: CPT | Mod: TC,RHCUB | Performed by: INTERNAL MEDICINE

## 2024-07-23 PROCEDURE — 3066F NEPHROPATHY DOC TX: CPT | Mod: ,,, | Performed by: INTERNAL MEDICINE

## 2024-07-23 PROCEDURE — 86803 HEPATITIS C AB TEST: CPT | Mod: ,,, | Performed by: CLINICAL MEDICAL LABORATORY

## 2024-07-23 PROCEDURE — 1126F AMNT PAIN NOTED NONE PRSNT: CPT | Mod: ,,, | Performed by: INTERNAL MEDICINE

## 2024-07-23 PROCEDURE — 3044F HG A1C LEVEL LT 7.0%: CPT | Mod: ,,, | Performed by: INTERNAL MEDICINE

## 2024-07-23 RX ORDER — ROSUVASTATIN CALCIUM 10 MG/1
10 TABLET, COATED ORAL NIGHTLY
Qty: 90 TABLET | Refills: 1 | Status: SHIPPED | OUTPATIENT
Start: 2024-07-23

## 2024-07-23 RX ORDER — GLIMEPIRIDE 2 MG/1
2 TABLET ORAL EVERY MORNING
Qty: 90 TABLET | Refills: 1 | Status: SHIPPED | OUTPATIENT
Start: 2024-07-23

## 2024-07-23 RX ORDER — OXYBUTYNIN CHLORIDE 5 MG/1
5 TABLET ORAL NIGHTLY
Qty: 60 TABLET | Refills: 2 | Status: SHIPPED | OUTPATIENT
Start: 2024-07-23

## 2024-07-23 RX ORDER — VALSARTAN 160 MG/1
160 TABLET ORAL NIGHTLY
Qty: 90 TABLET | Refills: 1 | Status: SHIPPED | OUTPATIENT
Start: 2024-07-23

## 2024-07-23 RX ORDER — FLUTICASONE PROPIONATE 50 MCG
1 SPRAY, SUSPENSION (ML) NASAL DAILY
Qty: 16 G | Refills: 3 | Status: CANCELLED | OUTPATIENT
Start: 2024-07-23

## 2024-07-23 RX ORDER — HYDROCODONE BITARTRATE AND ACETAMINOPHEN 7.5; 325 MG/1; MG/1
1 TABLET ORAL DAILY PRN
Qty: 10 TABLET | Refills: 0 | Status: CANCELLED | OUTPATIENT
Start: 2024-07-23

## 2024-07-23 RX ORDER — ATENOLOL 25 MG/1
75 TABLET ORAL DAILY
Qty: 90 TABLET | Refills: 3 | Status: SHIPPED | OUTPATIENT
Start: 2024-07-23

## 2024-07-23 RX ORDER — AMILORIDE HYDROCHLORIDE 5 MG/1
5 TABLET ORAL DAILY
Qty: 30 TABLET | Refills: 1 | Status: SHIPPED | OUTPATIENT
Start: 2024-07-23

## 2024-07-23 RX ORDER — NAPROXEN 500 MG/1
500 TABLET ORAL 2 TIMES DAILY PRN
Qty: 30 TABLET | Refills: 1 | Status: CANCELLED | OUTPATIENT
Start: 2024-07-23

## 2024-07-24 ENCOUNTER — TELEPHONE (OUTPATIENT)
Dept: FAMILY MEDICINE | Facility: CLINIC | Age: 69
End: 2024-07-24
Payer: MEDICARE

## 2024-07-24 NOTE — TELEPHONE ENCOUNTER
----- Message from Obed Durant MD sent at 7/23/2024  4:32 PM CDT -----  Need to see in   2 weeks  please  abnl results     0821 Pt is scheduled for 08/07/24

## 2024-07-25 PROBLEM — M54.50 LUMBAR PAIN: Status: ACTIVE | Noted: 2024-07-25

## 2024-07-25 PROBLEM — Z11.59 NEED FOR HEPATITIS C SCREENING TEST: Status: ACTIVE | Noted: 2024-07-25
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
## 2024-07-25 NOTE — PROGRESS NOTES
Subjective:       Patient ID: Amina Israel is a 69 y.o. female.    Chief Complaint: Medication Refill and Health Maintenance (Foot exam, eye exam, and dexa scan gris been ordeded. Mammogram and Cancer Screening is scheduled )    HPI  .  Patient presents with a chronic history of urinary incontinence patient has had a fall lower back area patient has diffuse tenderness in the lower back area patient has chronic hypertension chronic dyslipidemia patient has been having problems with the balance patient is in no acute distress today.  I will address health maintenance issues, Rollator walker today refer to neurology physical therapy refill the appropriate home medications.    Current Medications:    Current Outpatient Medications:     cyclobenzaprine (FLEXERIL) 10 MG tablet, Take 10 mg by mouth 3 (three) times daily as needed for Muscle spasms., Disp: , Rfl:     estradioL (ESTRACE) 0.01 % (0.1 mg/gram) vaginal cream, Place 1 g vaginally twice a week., Disp: 42.5 g, Rfl: 1    estrogens, conjugated, (PREMARIN) 1.25 MG tablet, Take 1.25 mg by mouth once daily., Disp: , Rfl:     fluconazole (DIFLUCAN) 200 MG Tab, TAKE ONE TABLET BY MOUTH EVERY DAY, Disp: 7 tablet, Rfl: 1    levothyroxine (SYNTHROID) 100 MCG tablet, Take 1 tablet (100 mcg total) by mouth before breakfast., Disp: 30 tablet, Rfl: 1    LUMIGAN 0.01 % Drop, Place 1 drop into both eyes every evening., Disp: , Rfl:     pantoprazole (PROTONIX) 40 MG tablet, Take 1 tablet (40 mg total) by mouth once daily., Disp: 90 tablet, Rfl: 1    potassium chloride SA (K-DUR,KLOR-CON) 20 MEQ tablet, Take 1 tablet (20 mEq total) by mouth 3 (three) times daily., Disp: 6 tablet, Rfl: 0    pregabalin (LYRICA) 150 MG capsule, Take 1 capsule (150 mg total) by mouth 3 (three) times daily., Disp: 90 capsule, Rfl: 2    sulfamethoxazole-trimethoprim 800-160mg (BACTRIM DS) 800-160 mg Tab, Take 1 tablet by mouth 2 (two) times daily., Disp: 14 tablet, Rfl: 1    aMILoride (MIDAMOR) 5 MG Tab,  "Take 1 tablet (5 mg total) by mouth once daily., Disp: 30 tablet, Rfl: 1    atenoloL (TENORMIN) 25 MG tablet, Take 3 tablets (75 mg total) by mouth once daily., Disp: 90 tablet, Rfl: 3    glimepiride (AMARYL) 2 MG tablet, Take 1 tablet (2 mg total) by mouth every morning., Disp: 90 tablet, Rfl: 1    oxybutynin (DITROPAN) 5 MG Tab, Take 1 tablet (5 mg total) by mouth every evening., Disp: 60 tablet, Rfl: 2    rosuvastatin (CRESTOR) 10 MG tablet, Take 1 tablet (10 mg total) by mouth every evening., Disp: 90 tablet, Rfl: 1    valsartan (DIOVAN) 160 MG tablet, Take 1 tablet (160 mg total) by mouth every evening., Disp: 90 tablet, Rfl: 1           ROS  Twelve point system reviewed, unremarkable except for stated above in HPI.        Objective:         Vitals:    07/23/24 1329 07/23/24 1442   BP: (!) 144/73 (!) 146/87   BP Location: Left arm    Patient Position: Sitting    BP Method: Large (Automatic)    Pulse: 69    Resp: 18    Temp: 97.8 °F (36.6 °C)    TempSrc: Temporal    SpO2: 97%    Weight: 79.8 kg (176 lb)    Height: 5' 7" (1.702 m)         Physical Exam     Patient is awake alert oriented person place and  Lungs are clear to auscultation bilaterally no crackles or wheezes   Cardiovascular S1-S2 regular rate and rhythm no murmurs rubs or gallops   Abdomen is soft positive bowel sounds nontender, extremities no clubbing cyanosis edema  Neuro no focal neurological deficits  Skin warm and dry.     Last Labs:     Office Visit on 07/23/2024   Component Date Value    Hemoglobin A1C 07/23/2024 5.5     Estimated Average Glucose 07/23/2024 111     Hepatitis C Ab 07/23/2024 Non-Reactive    Office Visit on 07/02/2024   Component Date Value    POC Amphetamines 07/02/2024 Negative     POC Barbiturates 07/02/2024 Negative     POC Benzodiazepines 07/02/2024 Negative     POC Cocaine 07/02/2024 Negative     POC THC 07/02/2024 Presumptive Positive (A)     POC Methadone 07/02/2024 Negative     POC Methamphetamine 07/02/2024 Negative  "    POC Opiates 07/02/2024 Negative     POC Oxycodone 07/02/2024 Negative     POC Phencyclidine 07/02/2024 Negative     POC Methylenedioxymetham* 07/02/2024 Negative     POC Tricyclic Antidepres* 07/02/2024 Negative     POC Buprenorphine 07/02/2024 Negative      Acceptab* 07/02/2024 Yes     POC Temperature (Urine) 07/02/2024 90        Last Imaging:  X-Ray Lumbar Spine Ap And Lateral  Narrative: EXAMINATION:  XR LUMBAR SPINE AP AND LATERAL    CLINICAL HISTORY:  Low back pain, unspecified    COMPARISON:  None    TECHNIQUE:  Frontal, lateral,  and coned-down L5-S1 views of the lumbosacral spine.    FINDINGS:  Multilevel moderate loss of disc space height and mild to moderate marginal vertebral body osteophyte formation.  Scattered posterior facet arthropathy.  Lumbar vertebral body heights and alignment appear maintained.  Atherosclerotic calcification demonstrated.  Impression: Degenerative change of the lumbar spine as detailed.    Point of Service: [facility]    Electronically signed by: Colby Justice  Date:    07/23/2024  Time:    16:02         **Labs and x-rays personally reviewed by me    ** reviewed           Assessment & Plan:       1. Fall, initial encounter  -     Ambulatory referral/consult to Neurology; Future; Expected date: 07/30/2024  -     Ambulatory referral/consult to Physical/Occupational Therapy; Future; Expected date: 07/30/2024    2. Lumbar pain  -     X-Ray Lumbar Spine Ap And Lateral; Future; Expected date: 07/23/2024    3. Need for hepatitis C screening test  -     Hepatitis C Antibody; Future; Expected date: 07/23/2024    4. Type 2 diabetes mellitus without complication, unspecified whether long term insulin use  -     Hemoglobin A1C; Future; Expected date: 07/23/2024    5. Other chronic pain  -     Cancel: Ambulatory referral/consult to Pain Clinic; Future; Expected date: 07/30/2024    Other orders  -     aMILoride (MIDAMOR) 5 MG Tab; Take 1 tablet (5 mg total)  by mouth once daily.  Dispense: 30 tablet; Refill: 1  -     atenoloL (TENORMIN) 25 MG tablet; Take 3 tablets (75 mg total) by mouth once daily.  Dispense: 90 tablet; Refill: 3  -     glimepiride (AMARYL) 2 MG tablet; Take 1 tablet (2 mg total) by mouth every morning.  Dispense: 90 tablet; Refill: 1  -     oxybutynin (DITROPAN) 5 MG Tab; Take 1 tablet (5 mg total) by mouth every evening.  Dispense: 60 tablet; Refill: 2  -     rosuvastatin (CRESTOR) 10 MG tablet; Take 1 tablet (10 mg total) by mouth every evening.  Dispense: 90 tablet; Refill: 1  -     valsartan (DIOVAN) 160 MG tablet; Take 1 tablet (160 mg total) by mouth every evening.  Dispense: 90 tablet; Refill: 1            Obed Durant MD

## 2024-07-31 ENCOUNTER — HOSPITAL ENCOUNTER (OUTPATIENT)
Dept: RADIOLOGY | Facility: HOSPITAL | Age: 69
Discharge: HOME | End: 2024-07-31
Attending: INTERNAL MEDICINE
Payer: MEDICARE

## 2024-07-31 DIAGNOSIS — Z12.31 ENCOUNTER FOR SCREENING MAMMOGRAM FOR MALIGNANT NEOPLASM OF BREAST: ICD-10-CM

## 2024-07-31 DIAGNOSIS — Z12.39 ENCOUNTER FOR SCREENING FOR MALIGNANT NEOPLASM OF BREAST, UNSPECIFIED SCREENING MODALITY: ICD-10-CM

## 2024-07-31 PROCEDURE — 77063 BREAST TOMOSYNTHESIS BI: CPT | Mod: TC

## 2024-07-31 PROCEDURE — 77067 SCR MAMMO BI INCL CAD: CPT | Mod: TC

## 2024-08-05 ENCOUNTER — APPOINTMENT (OUTPATIENT)
Dept: RADIOLOGY | Facility: CLINIC | Age: 69
End: 2024-08-05
Attending: STUDENT IN AN ORGANIZED HEALTH CARE EDUCATION/TRAINING PROGRAM
Payer: MEDICARE

## 2024-08-05 ENCOUNTER — OFFICE VISIT (OUTPATIENT)
Dept: FAMILY MEDICINE | Facility: CLINIC | Age: 69
End: 2024-08-05
Payer: MEDICARE

## 2024-08-05 VITALS — SYSTOLIC BLOOD PRESSURE: 140 MMHG | DIASTOLIC BLOOD PRESSURE: 89 MMHG

## 2024-08-05 DIAGNOSIS — U07.1 COVID-19: ICD-10-CM

## 2024-08-05 DIAGNOSIS — J02.9 SORE THROAT: ICD-10-CM

## 2024-08-05 DIAGNOSIS — U07.1 COVID-19: Primary | ICD-10-CM

## 2024-08-05 DIAGNOSIS — R05.9 COUGH, UNSPECIFIED TYPE: ICD-10-CM

## 2024-08-05 LAB
CTP QC/QA: YES
MOLECULAR STREP A: NEGATIVE
POC MOLECULAR INFLUENZA A AGN: NEGATIVE
POC MOLECULAR INFLUENZA B AGN: NEGATIVE
SARS-COV-2 RDRP RESP QL NAA+PROBE: POSITIVE

## 2024-08-05 PROCEDURE — 87651 STREP A DNA AMP PROBE: CPT | Mod: RHCUB | Performed by: STUDENT IN AN ORGANIZED HEALTH CARE EDUCATION/TRAINING PROGRAM

## 2024-08-05 PROCEDURE — 1101F PT FALLS ASSESS-DOCD LE1/YR: CPT | Mod: ,,, | Performed by: STUDENT IN AN ORGANIZED HEALTH CARE EDUCATION/TRAINING PROGRAM

## 2024-08-05 PROCEDURE — 3079F DIAST BP 80-89 MM HG: CPT | Mod: ,,, | Performed by: STUDENT IN AN ORGANIZED HEALTH CARE EDUCATION/TRAINING PROGRAM

## 2024-08-05 PROCEDURE — 4010F ACE/ARB THERAPY RXD/TAKEN: CPT | Mod: ,,, | Performed by: STUDENT IN AN ORGANIZED HEALTH CARE EDUCATION/TRAINING PROGRAM

## 2024-08-05 PROCEDURE — 71046 X-RAY EXAM CHEST 2 VIEWS: CPT | Mod: 26,,, | Performed by: RADIOLOGY

## 2024-08-05 PROCEDURE — 3066F NEPHROPATHY DOC TX: CPT | Mod: ,,, | Performed by: STUDENT IN AN ORGANIZED HEALTH CARE EDUCATION/TRAINING PROGRAM

## 2024-08-05 PROCEDURE — 71046 X-RAY EXAM CHEST 2 VIEWS: CPT | Mod: TC,RHCUB,FY | Performed by: STUDENT IN AN ORGANIZED HEALTH CARE EDUCATION/TRAINING PROGRAM

## 2024-08-05 PROCEDURE — 3077F SYST BP >= 140 MM HG: CPT | Mod: ,,, | Performed by: STUDENT IN AN ORGANIZED HEALTH CARE EDUCATION/TRAINING PROGRAM

## 2024-08-05 PROCEDURE — 87635 SARS-COV-2 COVID-19 AMP PRB: CPT | Mod: RHCUB | Performed by: STUDENT IN AN ORGANIZED HEALTH CARE EDUCATION/TRAINING PROGRAM

## 2024-08-05 PROCEDURE — 87502 INFLUENZA DNA AMP PROBE: CPT | Mod: RHCUB | Performed by: STUDENT IN AN ORGANIZED HEALTH CARE EDUCATION/TRAINING PROGRAM

## 2024-08-05 PROCEDURE — 3044F HG A1C LEVEL LT 7.0%: CPT | Mod: ,,, | Performed by: STUDENT IN AN ORGANIZED HEALTH CARE EDUCATION/TRAINING PROGRAM

## 2024-08-05 PROCEDURE — 3288F FALL RISK ASSESSMENT DOCD: CPT | Mod: ,,, | Performed by: STUDENT IN AN ORGANIZED HEALTH CARE EDUCATION/TRAINING PROGRAM

## 2024-08-05 PROCEDURE — 3061F NEG MICROALBUMINURIA REV: CPT | Mod: ,,, | Performed by: STUDENT IN AN ORGANIZED HEALTH CARE EDUCATION/TRAINING PROGRAM

## 2024-08-05 PROCEDURE — 1159F MED LIST DOCD IN RCRD: CPT | Mod: ,,, | Performed by: STUDENT IN AN ORGANIZED HEALTH CARE EDUCATION/TRAINING PROGRAM

## 2024-08-05 PROCEDURE — 99213 OFFICE O/P EST LOW 20 MIN: CPT | Mod: ,,, | Performed by: STUDENT IN AN ORGANIZED HEALTH CARE EDUCATION/TRAINING PROGRAM

## 2024-08-05 RX ORDER — NIRMATRELVIR AND RITONAVIR 300-100 MG
KIT ORAL
Qty: 30 TABLET | Refills: 0 | Status: SHIPPED | OUTPATIENT
Start: 2024-08-05

## 2024-08-05 RX ORDER — BENZONATATE 100 MG/1
100 CAPSULE ORAL 3 TIMES DAILY PRN
Qty: 30 CAPSULE | Refills: 0 | Status: SHIPPED | OUTPATIENT
Start: 2024-08-05 | End: 2024-08-15

## 2024-08-05 RX ORDER — GUAIFENESIN 600 MG/1
1200 TABLET, EXTENDED RELEASE ORAL 2 TIMES DAILY
Qty: 40 TABLET | Refills: 0 | Status: SHIPPED | OUTPATIENT
Start: 2024-08-05 | End: 2024-08-15

## 2024-08-05 RX ORDER — FLUTICASONE PROPIONATE 50 MCG
1 SPRAY, SUSPENSION (ML) NASAL DAILY
Qty: 15.8 ML | Refills: 0 | Status: SHIPPED | OUTPATIENT
Start: 2024-08-05

## 2024-08-12 DIAGNOSIS — Z12.11 COLON CANCER SCREENING: Primary | ICD-10-CM

## 2024-08-12 RX ORDER — POLYETHYLENE GLYCOL 3350, SODIUM SULFATE ANHYDROUS, SODIUM BICARBONATE, SODIUM CHLORIDE, POTASSIUM CHLORIDE 236; 22.74; 6.74; 5.86; 2.97 G/4L; G/4L; G/4L; G/4L; G/4L
4 POWDER, FOR SOLUTION ORAL ONCE
Qty: 4000 ML | Refills: 0 | Status: SHIPPED | OUTPATIENT
Start: 2024-08-12 | End: 2024-08-12

## 2024-08-26 ENCOUNTER — ANESTHESIA (OUTPATIENT)
Dept: GASTROENTEROLOGY | Facility: HOSPITAL | Age: 69
End: 2024-08-26
Payer: MEDICARE

## 2024-08-26 ENCOUNTER — ANESTHESIA EVENT (OUTPATIENT)
Dept: GASTROENTEROLOGY | Facility: HOSPITAL | Age: 69
End: 2024-08-26
Payer: MEDICARE

## 2024-08-26 ENCOUNTER — HOSPITAL ENCOUNTER (OUTPATIENT)
Dept: GASTROENTEROLOGY | Facility: HOSPITAL | Age: 69
Discharge: HOME | End: 2024-08-26
Attending: INTERNAL MEDICINE | Admitting: INTERNAL MEDICINE
Payer: MEDICARE

## 2024-08-26 VITALS
SYSTOLIC BLOOD PRESSURE: 112 MMHG | TEMPERATURE: 97 F | HEART RATE: 69 BPM | BODY MASS INDEX: 27 KG/M2 | WEIGHT: 172 LBS | HEIGHT: 67 IN | OXYGEN SATURATION: 99 % | DIASTOLIC BLOOD PRESSURE: 77 MMHG | RESPIRATION RATE: 13 BRPM

## 2024-08-26 DIAGNOSIS — Z12.11 ENCOUNTER FOR SCREENING COLONOSCOPY: ICD-10-CM

## 2024-08-26 DIAGNOSIS — R13.19 ESOPHAGEAL DYSPHAGIA: Primary | ICD-10-CM

## 2024-08-26 LAB — GLUCOSE SERPL-MCNC: 96 MG/DL (ref 70–105)

## 2024-08-26 PROCEDURE — D9220A PRA ANESTHESIA: Mod: PT,,,

## 2024-08-26 PROCEDURE — 63600175 PHARM REV CODE 636 W HCPCS

## 2024-08-26 PROCEDURE — 37000008 HC ANESTHESIA 1ST 15 MINUTES

## 2024-08-26 PROCEDURE — 45385 COLONOSCOPY W/LESION REMOVAL: CPT | Mod: PT | Performed by: INTERNAL MEDICINE

## 2024-08-26 PROCEDURE — 45380 COLONOSCOPY AND BIOPSY: CPT | Mod: 59,PT | Performed by: INTERNAL MEDICINE

## 2024-08-26 PROCEDURE — 27000284 HC CANNULA NASAL

## 2024-08-26 PROCEDURE — 82962 GLUCOSE BLOOD TEST: CPT

## 2024-08-26 PROCEDURE — 45385 COLONOSCOPY W/LESION REMOVAL: CPT | Mod: PT,,, | Performed by: INTERNAL MEDICINE

## 2024-08-26 PROCEDURE — 25000003 PHARM REV CODE 250

## 2024-08-26 PROCEDURE — 88305 TISSUE EXAM BY PATHOLOGIST: CPT | Mod: 26,,, | Performed by: PATHOLOGY

## 2024-08-26 PROCEDURE — 37000009 HC ANESTHESIA EA ADD 15 MINS

## 2024-08-26 PROCEDURE — 88305 TISSUE EXAM BY PATHOLOGIST: CPT | Mod: TC,91,SUR | Performed by: INTERNAL MEDICINE

## 2024-08-26 PROCEDURE — 45380 COLONOSCOPY AND BIOPSY: CPT | Mod: 59,PT,, | Performed by: INTERNAL MEDICINE

## 2024-08-26 RX ORDER — PROPOFOL 10 MG/ML
VIAL (ML) INTRAVENOUS
Status: DISCONTINUED | OUTPATIENT
Start: 2024-08-26 | End: 2024-08-26

## 2024-08-26 RX ORDER — LIDOCAINE HYDROCHLORIDE 20 MG/ML
INJECTION, SOLUTION EPIDURAL; INFILTRATION; INTRACAUDAL; PERINEURAL
Status: DISCONTINUED | OUTPATIENT
Start: 2024-08-26 | End: 2024-08-26

## 2024-08-26 RX ORDER — SODIUM CHLORIDE 0.9 % (FLUSH) 0.9 %
10 SYRINGE (ML) INJECTION
Status: DISCONTINUED | OUTPATIENT
Start: 2024-08-26 | End: 2024-08-27 | Stop reason: HOSPADM

## 2024-08-26 RX ADMIN — PROPOFOL 50 MG: 10 INJECTION, EMULSION INTRAVENOUS at 10:08

## 2024-08-26 RX ADMIN — PROPOFOL 20 MG: 10 INJECTION, EMULSION INTRAVENOUS at 10:08

## 2024-08-26 RX ADMIN — LIDOCAINE HYDROCHLORIDE 80 MG: 20 INJECTION, SOLUTION INTRAVENOUS at 09:08

## 2024-08-26 RX ADMIN — SODIUM CHLORIDE: 9 INJECTION, SOLUTION INTRAVENOUS at 09:08

## 2024-08-26 RX ADMIN — PROPOFOL 50 MG: 10 INJECTION, EMULSION INTRAVENOUS at 09:08

## 2024-08-26 RX ADMIN — PROPOFOL 30 MG: 10 INJECTION, EMULSION INTRAVENOUS at 10:08

## 2024-08-26 RX ADMIN — PROPOFOL 30 MG: 10 INJECTION, EMULSION INTRAVENOUS at 09:08

## 2024-08-26 NOTE — ANESTHESIA PREPROCEDURE EVALUATION
08/26/2024  Amina Israel is a 69 y.o., female.    Current Outpatient Medications on File Prior to Encounter   Medication Sig Dispense Refill    aMILoride (MIDAMOR) 5 MG Tab Take 1 tablet (5 mg total) by mouth once daily. 30 tablet 1    atenoloL (TENORMIN) 25 MG tablet Take 3 tablets (75 mg total) by mouth once daily. 90 tablet 3    cyclobenzaprine (FLEXERIL) 10 MG tablet Take 10 mg by mouth 3 (three) times daily as needed for Muscle spasms.      estradioL (ESTRACE) 0.01 % (0.1 mg/gram) vaginal cream Place 1 g vaginally twice a week. 42.5 g 1    estrogens, conjugated, (PREMARIN) 1.25 MG tablet Take 1.25 mg by mouth once daily.      fluconazole (DIFLUCAN) 200 MG Tab TAKE ONE TABLET BY MOUTH EVERY DAY (Patient not taking: Reported on 8/5/2024) 7 tablet 1    fluticasone propionate (FLONASE) 50 mcg/actuation nasal spray 1 spray (50 mcg total) by Each Nostril route once daily. 15.8 mL 0    glimepiride (AMARYL) 2 MG tablet Take 1 tablet (2 mg total) by mouth every morning. 90 tablet 1    levothyroxine (SYNTHROID) 100 MCG tablet Take 1 tablet (100 mcg total) by mouth before breakfast. 30 tablet 1    LUMIGAN 0.01 % Drop Place 1 drop into both eyes every evening.      nirmatrelvir-ritonavir (PAXLOVID) 300 mg (150 mg x 2)-100 mg copackaged tablets (EUA) Take 3 tablets by mouth 2 (two) times daily. Each dose contains 2 nirmatrelvir (pink tablets) and 1 ritonavir (white tablet). Take all 3 tablets together 30 tablet 0    oxybutynin (DITROPAN) 5 MG Tab Take 1 tablet (5 mg total) by mouth every evening. 60 tablet 2    pantoprazole (PROTONIX) 40 MG tablet Take 1 tablet (40 mg total) by mouth once daily. 90 tablet 1    potassium chloride SA (K-DUR,KLOR-CON) 20 MEQ tablet Take 1 tablet (20 mEq total) by mouth 3 (three) times daily. 6 tablet 0    pregabalin (LYRICA) 150 MG capsule Take 1 capsule (150 mg total) by mouth 3  (three) times daily. 90 capsule 2    rosuvastatin (CRESTOR) 10 MG tablet Take 1 tablet (10 mg total) by mouth every evening. 90 tablet 1    valsartan (DIOVAN) 160 MG tablet Take 1 tablet (160 mg total) by mouth every evening. 90 tablet 1     No current facility-administered medications on file prior to encounter.     Active Ambulatory Problems     Diagnosis Date Noted    Left shoulder pain 10/19/2021    Decreased range of motion of shoulder, left 10/19/2021    Influenza 01/10/2023    Encounter for screening colonoscopy 03/14/2024    Other chronic pain 03/14/2024    Rash 03/14/2024    DM (diabetes mellitus) 03/14/2024    Hypertension 03/14/2024    Proteinuria 03/27/2024    Hypokalemia 03/27/2024    Dyslipidemia 04/30/2024    Polyarthralgia 07/02/2024    Cervical spondylosis without myelopathy 07/02/2024    Need for hepatitis C screening test 07/25/2024    Lumbar pain 07/25/2024    Fall 07/25/2024     Resolved Ambulatory Problems     Diagnosis Date Noted    History of thyroid cancer 2005     Past Medical History:   Diagnosis Date    Diabetes mellitus, type 2     GERD (gastroesophageal reflux disease)     Hyperlipidemia     Hypothyroidism     Neuropathy     Spinal stenosis      Past Surgical History:   Procedure Laterality Date    CERVICAL DISCECTOMY  2005    C2-3    FOOT SURGERY Bilateral 2001    HYSTERECTOMY  2003    OOPHORECTOMY      POLYPECTOMY      REDUCTION OF BOTH BREASTS Bilateral 2006    TONSILLECTOMY  1974    TOTAL REDUCTION MAMMOPLASTY         Pre-op Assessment    I have reviewed the Patient Summary Reports.     I have reviewed the Nursing Notes. I have reviewed the NPO Status.   I have reviewed the Medications.     Review of Systems  Anesthesia Hx:  No problems with previous Anesthesia             Denies Family Hx of Anesthesia complications.    Denies Personal Hx of Anesthesia complications.                    Social:  Non-Smoker, No Alcohol Use       Hematology/Oncology:  Hematology Normal   Oncology  Normal                                   EENT/Dental:  EENT/Dental Normal           Cardiovascular:     Hypertension, poorly controlled           hyperlipidemia                             Pulmonary:        Sleep Apnea           Education provided regarding risk of obstructive sleep apnea            Renal/:  Renal/ Normal                 Hepatic/GI:  Bowel Prep.   GERD             Musculoskeletal:  Arthritis               Neurological:        Peripheral Neuropathy                          Endocrine:  Diabetes, well controlled, type 2 Hypothyroidism          Dermatological:  Skin Normal    Psych:  Psychiatric Normal                    Physical Exam  General: Well nourished, Cooperative, Alert and Oriented    Airway:  Mallampati: II   Mouth Opening: Normal  TM Distance: Normal  Tongue: Normal  Neck ROM: Normal ROM    Dental:  Intact    Chest/Lungs:  Normal Respiratory Rate        Anesthesia Plan  Type of Anesthesia, risks & benefits discussed:    Anesthesia Type: MAC  Intra-op Monitoring Plan: Standard ASA Monitors  Post Op Pain Control Plan: multimodal analgesia  Induction:  IV  Informed Consent: Informed consent signed with the Patient and all parties understand the risks and agree with anesthesia plan.  All questions answered. Patient consented to blood products? Yes  ASA Score: 3  Day of Surgery Review of History & Physical: H&P Update referred to the surgeon/provider.I have interviewed and examined the patient. I have reviewed the patient's H&P dated: There are no significant changes.     Ready For Surgery From Anesthesia Perspective.     .

## 2024-08-26 NOTE — TRANSFER OF CARE
"Anesthesia Transfer of Care Note    Patient: Amina Israel    Procedure(s) Performed: * Colonoscopy *    Patient location: GI    Anesthesia Type: MAC    Transport from OR: Transported from OR on room air with adequate spontaneous ventilation. Continuous ECG monitoring in transport. Continuous SpO2 monitoring in transport    Post pain: adequate analgesia    Post assessment: no apparent anesthetic complications    Post vital signs: stable    Level of consciousness: sedated and responds to stimulation    Nausea/Vomiting: no nausea/vomiting    Complications: none    Transfer of care protocol was followedComments: Good SV continue, NAD, VSS, RTRN      Last vitals: Visit Vitals  /71 (BP Location: Right arm, Patient Position: Lying)   Pulse 67   Temp 36.1 °C (97 °F) (Skin)   Resp 20   Ht 5' 7" (1.702 m)   Wt 78 kg (172 lb)   SpO2 100%   Breastfeeding No   BMI 26.94 kg/m²     "

## 2024-08-26 NOTE — ANESTHESIA POSTPROCEDURE EVALUATION
Anesthesia Post Evaluation    Patient: Amina Israel    Procedure(s) Performed: * Colonoscopy *    Final Anesthesia Type: MAC      Patient location during evaluation: GI PACU  Patient participation: Yes- Able to Participate  Level of consciousness: awake and alert  Post-procedure vital signs: reviewed and stable  Pain management: adequate  Airway patency: patent    PONV status at discharge: No PONV  Anesthetic complications: no      Cardiovascular status: blood pressure returned to baseline and hemodynamically stable  Respiratory status: unassisted, spontaneous ventilation and room air  Hydration status: euvolemic  Follow-up not needed.  Comments: Refer to nursing notes for pain/natalie score upon discharge from recovery.              Vitals Value Taken Time   /77 08/26/24 1033   Temp 36.1 °C (97 °F) 08/26/24 1014   Pulse 62 08/26/24 1037   Resp 10 08/26/24 1028   SpO2 98 % 08/26/24 1037   Vitals shown include unfiled device data.      Event Time   Out of Recovery 10:49:06         Pain/Natalie Score: Natalie Score: 9 (8/26/2024 10:18 AM)

## 2024-08-26 NOTE — H&P
Gastroenterology Pre-procedure H&P    History of Present Illness    Amina Israel is a 69 y.o. female that  has a past medical history of Diabetes mellitus, type 2, GERD (gastroesophageal reflux disease), History of thyroid cancer (2005), Hyperlipidemia, Hypertension, Hypothyroidism, Neuropathy, and Spinal stenosis.     Patient here for routine surveillance    Patient denies wt loss, abdominal pain, diarrhea, melena/hematochezia, change in stool caliber, no anticoagulants, FMHx of GI related malignancies.      Past Medical History:   Diagnosis Date    Diabetes mellitus, type 2     GERD (gastroesophageal reflux disease)     History of thyroid cancer 2005    Hyperlipidemia     Hypertension     Hypothyroidism     Neuropathy     Spinal stenosis        Past Surgical History:   Procedure Laterality Date    CERVICAL DISCECTOMY  2005    C2-3    FOOT SURGERY Bilateral 2001    HYSTERECTOMY  2003    OOPHORECTOMY      POLYPECTOMY      REDUCTION OF BOTH BREASTS Bilateral 2006    TONSILLECTOMY  1974    TOTAL REDUCTION MAMMOPLASTY         Family History   Problem Relation Name Age of Onset    Hypertension Mother      Diabetic kidney disease Mother      Tuberculosis Father      Asbestos Brother      Cancer Brother      No Known Problems Daughter         Social History     Socioeconomic History    Marital status:    Tobacco Use    Smoking status: Never    Smokeless tobacco: Never   Substance and Sexual Activity    Alcohol use: Never    Drug use: Never    Sexual activity: Not Currently       Current Outpatient Medications   Medication Sig Dispense Refill    aMILoride (MIDAMOR) 5 MG Tab Take 1 tablet (5 mg total) by mouth once daily. 30 tablet 1    atenoloL (TENORMIN) 25 MG tablet Take 3 tablets (75 mg total) by mouth once daily. 90 tablet 3    cyclobenzaprine (FLEXERIL) 10 MG tablet Take 10 mg by mouth 3 (three) times daily as needed for Muscle spasms.      estradioL (ESTRACE) 0.01 % (0.1 mg/gram) vaginal cream Place 1 g  vaginally twice a week. 42.5 g 1    estrogens, conjugated, (PREMARIN) 1.25 MG tablet Take 1.25 mg by mouth once daily.      fluconazole (DIFLUCAN) 200 MG Tab TAKE ONE TABLET BY MOUTH EVERY DAY (Patient not taking: Reported on 8/5/2024) 7 tablet 1    fluticasone propionate (FLONASE) 50 mcg/actuation nasal spray 1 spray (50 mcg total) by Each Nostril route once daily. 15.8 mL 0    glimepiride (AMARYL) 2 MG tablet Take 1 tablet (2 mg total) by mouth every morning. 90 tablet 1    levothyroxine (SYNTHROID) 100 MCG tablet Take 1 tablet (100 mcg total) by mouth before breakfast. 30 tablet 1    LUMIGAN 0.01 % Drop Place 1 drop into both eyes every evening.      nirmatrelvir-ritonavir (PAXLOVID) 300 mg (150 mg x 2)-100 mg copackaged tablets (EUA) Take 3 tablets by mouth 2 (two) times daily. Each dose contains 2 nirmatrelvir (pink tablets) and 1 ritonavir (white tablet). Take all 3 tablets together 30 tablet 0    oxybutynin (DITROPAN) 5 MG Tab Take 1 tablet (5 mg total) by mouth every evening. 60 tablet 2    pantoprazole (PROTONIX) 40 MG tablet Take 1 tablet (40 mg total) by mouth once daily. 90 tablet 1    potassium chloride SA (K-DUR,KLOR-CON) 20 MEQ tablet Take 1 tablet (20 mEq total) by mouth 3 (three) times daily. 6 tablet 0    pregabalin (LYRICA) 150 MG capsule Take 1 capsule (150 mg total) by mouth 3 (three) times daily. 90 capsule 2    rosuvastatin (CRESTOR) 10 MG tablet Take 1 tablet (10 mg total) by mouth every evening. 90 tablet 1    valsartan (DIOVAN) 160 MG tablet Take 1 tablet (160 mg total) by mouth every evening. 90 tablet 1     No current facility-administered medications for this encounter.       Review of patient's allergies indicates:   Allergen Reactions    Metformin Diarrhea    Phenergan plain Nausea And Vomiting       Objective:  There were no vitals filed for this visit.     GEN: normal appearing, NAD, AAO x3  HENT: NCAT, anicteric, OP benign  CV: normal rate, regular rhythm  RESP: NABS, symmetric  rise, unlabored  ABD: soft, ND, no guarding or TTP  SKIN: warm and dry  NEURO: grossly afocal    Assessment and Plan:    Proceed with:    Colonoscopy for previous adenomatous polyp, screening for colon cancer    Vahe Rey MD  Gastroenterology

## 2024-08-27 LAB
ESTROGEN SERPL-MCNC: NORMAL PG/ML
INSULIN SERPL-ACNC: NORMAL U[IU]/ML
LAB AP GROSS DESCRIPTION: NORMAL
LAB AP LABORATORY NOTES: NORMAL
T3RU NFR SERPL: NORMAL %

## 2024-09-17 ENCOUNTER — OFFICE VISIT (OUTPATIENT)
Dept: NEUROLOGY | Facility: CLINIC | Age: 69
End: 2024-09-17
Payer: MEDICARE

## 2024-09-17 VITALS
OXYGEN SATURATION: 100 % | DIASTOLIC BLOOD PRESSURE: 102 MMHG | RESPIRATION RATE: 18 BRPM | HEART RATE: 60 BPM | BODY MASS INDEX: 27.62 KG/M2 | WEIGHT: 176 LBS | SYSTOLIC BLOOD PRESSURE: 172 MMHG | HEIGHT: 67 IN
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
## 2024-09-17 PROCEDURE — 3077F SYST BP >= 140 MM HG: CPT | Mod: CPTII,,, | Performed by: PSYCHIATRY & NEUROLOGY

## 2024-09-17 PROCEDURE — 3066F NEPHROPATHY DOC TX: CPT | Mod: CPTII,,, | Performed by: PSYCHIATRY & NEUROLOGY

## 2024-09-17 PROCEDURE — 3061F NEG MICROALBUMINURIA REV: CPT | Mod: CPTII,,, | Performed by: PSYCHIATRY & NEUROLOGY

## 2024-09-17 PROCEDURE — 99204 OFFICE O/P NEW MOD 45 MIN: CPT | Mod: S$PBB,,, | Performed by: PSYCHIATRY & NEUROLOGY

## 2024-09-17 PROCEDURE — 3288F FALL RISK ASSESSMENT DOCD: CPT | Mod: CPTII,,, | Performed by: PSYCHIATRY & NEUROLOGY

## 2024-09-17 PROCEDURE — 99215 OFFICE O/P EST HI 40 MIN: CPT | Mod: PBBFAC | Performed by: PSYCHIATRY & NEUROLOGY

## 2024-09-17 PROCEDURE — 3044F HG A1C LEVEL LT 7.0%: CPT | Mod: CPTII,,, | Performed by: PSYCHIATRY & NEUROLOGY

## 2024-09-17 PROCEDURE — 4010F ACE/ARB THERAPY RXD/TAKEN: CPT | Mod: CPTII,,, | Performed by: PSYCHIATRY & NEUROLOGY

## 2024-09-17 PROCEDURE — 1159F MED LIST DOCD IN RCRD: CPT | Mod: CPTII,,, | Performed by: PSYCHIATRY & NEUROLOGY

## 2024-09-17 PROCEDURE — 3008F BODY MASS INDEX DOCD: CPT | Mod: CPTII,,, | Performed by: PSYCHIATRY & NEUROLOGY

## 2024-09-17 PROCEDURE — 3080F DIAST BP >= 90 MM HG: CPT | Mod: CPTII,,, | Performed by: PSYCHIATRY & NEUROLOGY

## 2024-09-17 PROCEDURE — 1125F AMNT PAIN NOTED PAIN PRSNT: CPT | Mod: CPTII,,, | Performed by: PSYCHIATRY & NEUROLOGY

## 2024-09-17 PROCEDURE — 99999 PR PBB SHADOW E&M-EST. PATIENT-LVL V: CPT | Mod: PBBFAC,,, | Performed by: PSYCHIATRY & NEUROLOGY

## 2024-09-17 PROCEDURE — 1101F PT FALLS ASSESS-DOCD LE1/YR: CPT | Mod: CPTII,,, | Performed by: PSYCHIATRY & NEUROLOGY

## 2024-09-17 NOTE — PROGRESS NOTES
Subjective:       Patient ID: Amina Israel is a 69 y.o. female     Chief Complaint:    Chief Complaint   Patient presents with    falling     Pt. States sciatica had 2 falls. muscle weakness        Allergies:  Metformin and Phenergan plain    Current Medications:    Outpatient Encounter Medications as of 9/17/2024   Medication Sig Dispense Refill    aMILoride (MIDAMOR) 5 MG Tab Take 1 tablet (5 mg total) by mouth once daily. 30 tablet 1    atenoloL (TENORMIN) 25 MG tablet Take 3 tablets (75 mg total) by mouth once daily. 90 tablet 3    cyclobenzaprine (FLEXERIL) 10 MG tablet Take 10 mg by mouth 3 (three) times daily as needed for Muscle spasms.      estradioL (ESTRACE) 0.01 % (0.1 mg/gram) vaginal cream Place 1 g vaginally twice a week. 42.5 g 1    estrogens, conjugated, (PREMARIN) 1.25 MG tablet Take 1.25 mg by mouth once daily.      fluticasone propionate (FLONASE) 50 mcg/actuation nasal spray 1 spray (50 mcg total) by Each Nostril route once daily. 15.8 mL 0    glimepiride (AMARYL) 2 MG tablet Take 1 tablet (2 mg total) by mouth every morning. 90 tablet 1    levothyroxine (SYNTHROID) 100 MCG tablet Take 1 tablet (100 mcg total) by mouth before breakfast. 30 tablet 1    LUMIGAN 0.01 % Drop Place 1 drop into both eyes every evening.      nirmatrelvir-ritonavir (PAXLOVID) 300 mg (150 mg x 2)-100 mg copackaged tablets (EUA) Take 3 tablets by mouth 2 (two) times daily. Each dose contains 2 nirmatrelvir (pink tablets) and 1 ritonavir (white tablet). Take all 3 tablets together 30 tablet 0    oxybutynin (DITROPAN) 5 MG Tab Take 1 tablet (5 mg total) by mouth every evening. 60 tablet 2    pantoprazole (PROTONIX) 40 MG tablet Take 1 tablet (40 mg total) by mouth once daily. 90 tablet 1    pregabalin (LYRICA) 150 MG capsule Take 1 capsule (150 mg total) by mouth 3 (three) times daily. 90 capsule 2    rosuvastatin (CRESTOR) 10 MG tablet Take 1 tablet (10 mg total) by mouth every evening. 90 tablet 1    valsartan  "(DIOVAN) 160 MG tablet Take 1 tablet (160 mg total) by mouth every evening. 90 tablet 1    [DISCONTINUED] fluconazole (DIFLUCAN) 200 MG Tab TAKE ONE TABLET BY MOUTH EVERY DAY (Patient not taking: Reported on 8/5/2024) 7 tablet 1    [DISCONTINUED] potassium chloride SA (K-DUR,KLOR-CON) 20 MEQ tablet Take 1 tablet (20 mEq total) by mouth 3 (three) times daily. 6 tablet 0    [DISCONTINUED] sodium chloride 0.9% flush 10 mL        No facility-administered encounter medications on file as of 9/17/2024.       History of Present Illness  68 yo BF referred for "falls"-  Has hx of chronic pain and mainly LBP w/ sciatica and radiculopathy- she denies any LOC or near syncopal symptoms   PT actually never fall but once "fell over backwards in a chair whild making it recline" then the other time was when she was keeping neighbor dogs away from her kittens   She also never went to PT/Rehab that Dr Durant ordered   She has been off her cane for several yrs and does not need a rollator walker - she has been walking 1-2 miles daily and doing well         Past Medical History:   Diagnosis Date    Diabetes mellitus, type 2     GERD (gastroesophageal reflux disease)     History of thyroid cancer 2005    Hyperlipidemia     Hypertension     Hypothyroidism     Neuropathy     Spinal stenosis        Past Surgical History:   Procedure Laterality Date    CERVICAL DISCECTOMY  2005    C2-3    FOOT SURGERY Bilateral 2001    HYSTERECTOMY  2003    OOPHORECTOMY      POLYPECTOMY      REDUCTION OF BOTH BREASTS Bilateral 2006    TONSILLECTOMY  1974    TOTAL REDUCTION MAMMOPLASTY         Social History  Ms. Israel  reports that she has never smoked. She has never used smokeless tobacco. She reports that she does not drink alcohol and does not use drugs.    Family History  Ms.'s Israel family history includes Asbestos in her brother; Cancer in her brother; Diabetic kidney disease in her mother; Hypertension in her mother; No Known Problems in her " "daughter; Tuberculosis in her father.    Review of Systems  Review of Systems   Musculoskeletal:  Positive for back pain, falls and joint pain.   All other systems reviewed and are negative.     Objective:   BP (!) 172/102 (BP Location: Right arm, Patient Position: Sitting, BP Method: Large (Manual))   Pulse 60   Resp 18   Ht 5' 7" (1.702 m)   Wt 79.8 kg (176 lb)   SpO2 100%   BMI 27.57 kg/m²    NEUROLOGICAL EXAMINATION:     MENTAL STATUS   Oriented to person, place, and time.   Level of consciousness: alert  Knowledge: good.     CRANIAL NERVES   Cranial nerves II through XII intact.     MOTOR EXAM     Strength   Strength 5/5 throughout.     GAIT AND COORDINATION     Gait  Gait: normal       Physical Exam  Vitals reviewed.   Constitutional:       Appearance: She is normal weight.   Neurological:      General: No focal deficit present.      Mental Status: She is alert and oriented to person, place, and time. Mental status is at baseline.      Cranial Nerves: Cranial nerves 2-12 are intact.      Motor: Motor strength is normal.     Gait: Gait is intact.          Assessment:     Fall, initial encounter  -     Ambulatory referral/consult to Neurology         Primary Diagnosis and ICD10  No primary diagnosis found.    Plan:     Patient Instructions   Cont phys activity as tolerated   Cont control risk factors   Cont Lyrica as working well for her   F/u prn     Medications Discontinued During This Encounter   Medication Reason    fluconazole (DIFLUCAN) 200 MG Tab     potassium chloride SA (K-DUR,KLOR-CON) 20 MEQ tablet        Requested Prescriptions      No prescriptions requested or ordered in this encounter       "

## 2024-09-17 NOTE — PATIENT INSTRUCTIONS
Cont phys activity as tolerated   Cont control risk factors   Cont Lyrica as working well for her   F/u prn

## 2024-10-21 ENCOUNTER — OFFICE VISIT (OUTPATIENT)
Dept: FAMILY MEDICINE | Facility: CLINIC | Age: 69
End: 2024-10-21
Payer: MEDICARE

## 2024-10-21 VITALS
SYSTOLIC BLOOD PRESSURE: 135 MMHG | DIASTOLIC BLOOD PRESSURE: 86 MMHG | RESPIRATION RATE: 18 BRPM | OXYGEN SATURATION: 97 % | WEIGHT: 181.63 LBS | HEIGHT: 67 IN | BODY MASS INDEX: 28.51 KG/M2 | TEMPERATURE: 98 F | HEART RATE: 67 BPM

## 2024-10-21 DIAGNOSIS — M47.812 CERVICAL SPONDYLOSIS WITHOUT MYELOPATHY: Chronic | ICD-10-CM

## 2024-10-21 DIAGNOSIS — M25.50 POLYARTHRALGIA: Chronic | ICD-10-CM

## 2024-10-21 DIAGNOSIS — G89.29 OTHER CHRONIC PAIN: ICD-10-CM

## 2024-10-21 DIAGNOSIS — R53.1 WEAKNESS: Primary | ICD-10-CM

## 2024-10-21 LAB
ANION GAP SERPL CALCULATED.3IONS-SCNC: 8 MMOL/L (ref 7–16)
BASOPHILS # BLD AUTO: 0.08 K/UL (ref 0–0.2)
BASOPHILS NFR BLD AUTO: 0.8 % (ref 0–1)
BUN SERPL-MCNC: 20 MG/DL (ref 7–18)
BUN/CREAT SERPL: 25 (ref 6–20)
CALCIUM SERPL-MCNC: 9.1 MG/DL (ref 8.5–10.1)
CHLORIDE SERPL-SCNC: 108 MMOL/L (ref 98–107)
CK SERPL-CCNC: 89 U/L (ref 26–192)
CO2 SERPL-SCNC: 26 MMOL/L (ref 21–32)
CREAT SERPL-MCNC: 0.79 MG/DL (ref 0.55–1.02)
DIFFERENTIAL METHOD BLD: ABNORMAL
EGFR (NO RACE VARIABLE) (RUSH/TITUS): 82 ML/MIN/1.73M2
EOSINOPHIL # BLD AUTO: 0.38 K/UL (ref 0–0.5)
EOSINOPHIL NFR BLD AUTO: 3.8 % (ref 1–4)
ERYTHROCYTE [DISTWIDTH] IN BLOOD BY AUTOMATED COUNT: 13.5 % (ref 11.5–14.5)
GLUCOSE SERPL-MCNC: 79 MG/DL (ref 74–106)
HCT VFR BLD AUTO: 41.2 % (ref 38–47)
HGB BLD-MCNC: 13.4 G/DL (ref 12–16)
IMM GRANULOCYTES # BLD AUTO: 0.06 K/UL (ref 0–0.04)
IMM GRANULOCYTES NFR BLD: 0.6 % (ref 0–0.4)
LYMPHOCYTES # BLD AUTO: 3.71 K/UL (ref 1–4.8)
LYMPHOCYTES NFR BLD AUTO: 37.3 % (ref 27–41)
MCH RBC QN AUTO: 30 PG (ref 27–31)
MCHC RBC AUTO-ENTMCNC: 32.5 G/DL (ref 32–36)
MCV RBC AUTO: 92.2 FL (ref 80–96)
MONOCYTES # BLD AUTO: 0.95 K/UL (ref 0–0.8)
MONOCYTES NFR BLD AUTO: 9.6 % (ref 2–6)
MPC BLD CALC-MCNC: 11.3 FL (ref 9.4–12.4)
NEUTROPHILS # BLD AUTO: 4.76 K/UL (ref 1.8–7.7)
NEUTROPHILS NFR BLD AUTO: 47.9 % (ref 53–65)
NRBC # BLD AUTO: 0 X10E3/UL
NRBC, AUTO (.00): 0 %
PLATELET # BLD AUTO: 177 K/UL (ref 150–400)
POTASSIUM SERPL-SCNC: 4.1 MMOL/L (ref 3.5–5.1)
RBC # BLD AUTO: 4.47 M/UL (ref 4.2–5.4)
SODIUM SERPL-SCNC: 138 MMOL/L (ref 136–145)
WBC # BLD AUTO: 9.94 K/UL (ref 4.5–11)

## 2024-10-21 PROCEDURE — 3008F BODY MASS INDEX DOCD: CPT | Mod: ,,, | Performed by: INTERNAL MEDICINE

## 2024-10-21 PROCEDURE — 82550 ASSAY OF CK (CPK): CPT | Mod: ,,, | Performed by: CLINICAL MEDICAL LABORATORY

## 2024-10-21 PROCEDURE — 3079F DIAST BP 80-89 MM HG: CPT | Mod: ,,, | Performed by: INTERNAL MEDICINE

## 2024-10-21 PROCEDURE — 3066F NEPHROPATHY DOC TX: CPT | Mod: ,,, | Performed by: INTERNAL MEDICINE

## 2024-10-21 PROCEDURE — 80048 BASIC METABOLIC PNL TOTAL CA: CPT | Mod: ,,, | Performed by: CLINICAL MEDICAL LABORATORY

## 2024-10-21 PROCEDURE — 99214 OFFICE O/P EST MOD 30 MIN: CPT | Mod: ,,, | Performed by: INTERNAL MEDICINE

## 2024-10-21 PROCEDURE — 4010F ACE/ARB THERAPY RXD/TAKEN: CPT | Mod: ,,, | Performed by: INTERNAL MEDICINE

## 2024-10-21 PROCEDURE — 3288F FALL RISK ASSESSMENT DOCD: CPT | Mod: ,,, | Performed by: INTERNAL MEDICINE

## 2024-10-21 PROCEDURE — 3075F SYST BP GE 130 - 139MM HG: CPT | Mod: ,,, | Performed by: INTERNAL MEDICINE

## 2024-10-21 PROCEDURE — 1126F AMNT PAIN NOTED NONE PRSNT: CPT | Mod: ,,, | Performed by: INTERNAL MEDICINE

## 2024-10-21 PROCEDURE — 3061F NEG MICROALBUMINURIA REV: CPT | Mod: ,,, | Performed by: INTERNAL MEDICINE

## 2024-10-21 PROCEDURE — 85025 COMPLETE CBC W/AUTO DIFF WBC: CPT | Mod: ,,, | Performed by: CLINICAL MEDICAL LABORATORY

## 2024-10-21 PROCEDURE — 3044F HG A1C LEVEL LT 7.0%: CPT | Mod: ,,, | Performed by: INTERNAL MEDICINE

## 2024-10-21 PROCEDURE — 1101F PT FALLS ASSESS-DOCD LE1/YR: CPT | Mod: ,,, | Performed by: INTERNAL MEDICINE

## 2024-10-21 PROCEDURE — 1159F MED LIST DOCD IN RCRD: CPT | Mod: ,,, | Performed by: INTERNAL MEDICINE

## 2024-10-21 RX ORDER — GABAPENTIN 100 MG/1
100 CAPSULE ORAL NIGHTLY PRN
Qty: 90 CAPSULE | Refills: 1 | Status: CANCELLED | OUTPATIENT
Start: 2024-10-21

## 2024-10-22 RX ORDER — PREGABALIN 150 MG/1
150 CAPSULE ORAL 2 TIMES DAILY
Qty: 180 CAPSULE | Refills: 3 | Status: SHIPPED | OUTPATIENT
Start: 2024-10-22

## 2024-10-22 RX ORDER — GLIMEPIRIDE 2 MG/1
2 TABLET ORAL EVERY MORNING
Qty: 90 TABLET | Refills: 1 | Status: SHIPPED | OUTPATIENT
Start: 2024-10-22

## 2024-10-22 RX ORDER — OXYBUTYNIN CHLORIDE 5 MG/1
5 TABLET ORAL NIGHTLY
Qty: 90 TABLET | Refills: 1 | Status: SHIPPED | OUTPATIENT
Start: 2024-10-22

## 2024-10-22 RX ORDER — ROSUVASTATIN CALCIUM 10 MG/1
10 TABLET, COATED ORAL NIGHTLY
Qty: 90 TABLET | Refills: 1 | Status: SHIPPED | OUTPATIENT
Start: 2024-10-22

## 2024-10-22 RX ORDER — VALSARTAN 160 MG/1
160 TABLET ORAL NIGHTLY
Qty: 90 TABLET | Refills: 1 | Status: SHIPPED | OUTPATIENT
Start: 2024-10-22

## 2024-10-22 RX ORDER — PANTOPRAZOLE SODIUM 40 MG/1
40 TABLET, DELAYED RELEASE ORAL DAILY
Qty: 90 TABLET | Refills: 1 | Status: SHIPPED | OUTPATIENT
Start: 2024-10-22

## 2024-10-22 RX ORDER — AMILORIDE HYDROCHLORIDE 5 MG/1
5 TABLET ORAL DAILY
Qty: 30 TABLET | Refills: 1 | Status: SHIPPED | OUTPATIENT
Start: 2024-10-22

## 2024-10-23 NOTE — PROGRESS NOTES
Subjective:       Patient ID: Amina Israel is a 69 y.o. female.    Chief Complaint: Diabetes and Health Maintenance (Pt refuses care gaps at this time )    HPI  .  Patient presents with urinary incontinence generalized weakness and fatigue does have a history of COVID also complains of chronic pain and wants to see pain management again she has chronic hypertension chronic diabetes and I will address these problems below.  Also due to the weakness going to refer her to physical therapy at [facility].    Current Medications:    Current Outpatient Medications:     atenoloL (TENORMIN) 25 MG tablet, Take 3 tablets (75 mg total) by mouth once daily., Disp: 90 tablet, Rfl: 3    cyclobenzaprine (FLEXERIL) 10 MG tablet, Take 10 mg by mouth 3 (three) times daily as needed for Muscle spasms., Disp: , Rfl:     estradioL (ESTRACE) 0.01 % (0.1 mg/gram) vaginal cream, Place 1 g vaginally twice a week., Disp: 42.5 g, Rfl: 1    fluticasone propionate (FLONASE) 50 mcg/actuation nasal spray, 1 spray (50 mcg total) by Each Nostril route once daily., Disp: 15.8 mL, Rfl: 0    levothyroxine (SYNTHROID) 100 MCG tablet, Take 1 tablet (100 mcg total) by mouth before breakfast., Disp: 30 tablet, Rfl: 1    LUMIGAN 0.01 % Drop, Place 1 drop into both eyes every evening., Disp: , Rfl:     nirmatrelvir-ritonavir (PAXLOVID) 300 mg (150 mg x 2)-100 mg copackaged tablets (EUA), Take 3 tablets by mouth 2 (two) times daily. Each dose contains 2 nirmatrelvir (pink tablets) and 1 ritonavir (white tablet). Take all 3 tablets together, Disp: 30 tablet, Rfl: 0    aMILoride (MIDAMOR) 5 MG Tab, Take 1 tablet (5 mg total) by mouth once daily., Disp: 30 tablet, Rfl: 1    estrogens, conjugated, (PREMARIN) 1.25 MG tablet, Take 1 tablet (1.25 mg total) by mouth once daily., Disp: 90 tablet, Rfl: 1    glimepiride (AMARYL) 2 MG tablet, Take 1 tablet (2 mg total) by mouth every morning., Disp: 90 tablet, Rfl: 1    oxybutynin (DITROPAN) 5 MG Tab, Take 1  "tablet (5 mg total) by mouth every evening., Disp: 90 tablet, Rfl: 1    pantoprazole (PROTONIX) 40 MG tablet, Take 1 tablet (40 mg total) by mouth once daily., Disp: 90 tablet, Rfl: 1    pregabalin (LYRICA) 150 MG capsule, Take 1 capsule (150 mg total) by mouth 2 (two) times daily., Disp: 180 capsule, Rfl: 3    rosuvastatin (CRESTOR) 10 MG tablet, Take 1 tablet (10 mg total) by mouth every evening., Disp: 90 tablet, Rfl: 1    valsartan (DIOVAN) 160 MG tablet, Take 1 tablet (160 mg total) by mouth every evening., Disp: 90 tablet, Rfl: 1           ROS  Twelve point system reviewed, unremarkable except for stated above in HPI.        Objective:         Vitals:    10/21/24 1509 10/21/24 1510   BP: (!) 160/92 135/86  Comment: at home   BP Location: Right arm    Patient Position: Sitting    Pulse: 67    Resp: 18    Temp: 97.7 °F (36.5 °C)    TempSrc: Tympanic    SpO2: 97%    Weight: 82.4 kg (181 lb 9.6 oz)    Height: 5' 7" (1.702 m)         Physical Exam     Patient is awake alert oriented person place and  Lungs are clear to auscultation bilaterally no crackles or wheezes   Cardiovascular S1-S2 regular rate and rhythm no murmurs rubs or gallops   Abdomen is soft positive bowel sounds nontender, extremities no clubbing cyanosis edema  Neuro no focal neurological deficits  Skin warm and dry.     Last Labs:     Office Visit on 10/21/2024   Component Date Value    Sodium 10/21/2024 138     Potassium 10/21/2024 4.1     Chloride 10/21/2024 108 (H)     CO2 10/21/2024 26     Anion Gap 10/21/2024 8     Glucose 10/21/2024 79     BUN 10/21/2024 20 (H)     Creatinine 10/21/2024 0.79     BUN/Creatinine Ratio 10/21/2024 25 (H)     Calcium 10/21/2024 9.1     eGFR 10/21/2024 82     CK 10/21/2024 89     WBC 10/21/2024 9.94     RBC 10/21/2024 4.47     Hemoglobin 10/21/2024 13.4     Hematocrit 10/21/2024 41.2     MCV 10/21/2024 92.2     MCH 10/21/2024 30.0     MCHC 10/21/2024 32.5     RDW 10/21/2024 13.5     Platelet Count 10/21/2024 177  "    MPV 10/21/2024 11.3     Neutrophils % 10/21/2024 47.9 (L)     Lymphocytes % 10/21/2024 37.3     Monocytes % 10/21/2024 9.6 (H)     Eosinophils % 10/21/2024 3.8     Basophils % 10/21/2024 0.8     Immature Granulocytes % 10/21/2024 0.6 (H)     nRBC, Auto 10/21/2024 0.0     Neutrophils, Abs 10/21/2024 4.76     Lymphocytes, Absolute 10/21/2024 3.71     Monocytes, Absolute 10/21/2024 0.95 (H)     Eosinophils, Absolute 10/21/2024 0.38     Basophils, Absolute 10/21/2024 0.08     Immature Granulocytes, A* 10/21/2024 0.06 (H)     nRBC, Absolute 10/21/2024 0.00     Diff Type 10/21/2024 Auto        Last Imaging:  Colonoscopy  Narrative: Table formatting from the original result was not included.  Procedure Date  8/26/24    Impression  Overall   Impression:    Four polyps measuring from 3 mm up to 15 mm in the ascending colon;   performed cold forceps biopsy; performed cold snare removal; performed hot   snare removal  Three polyps measuring from 5 mm up to 15 mm in the descending colon;   performed cold snare removal; performed hot snare removal  2 subcentimeter polyps in the sigmoid colon were removed with cold snare   and hot snare  Scattered diverticulosis of mild severity in the sigmoid colon  The ileocecal valve, cecum and transverse colon appeared normal.  (grade 2) hemorrhoids    Recommendation  Await pathology results   Repeat colonoscopy in 3 years  Please schedule EGD for reported dysphagia - OK to overbook 9/6 or 9/9-11     Outcome of procedure: successful Colonoscopy  Disposition: patient to recovery following procedure; discharge to home   when appropriate parameters met  Provisions for follow up: please call my office for any unexpected   symptoms like chest or abdominal pain or bleeding following your   procedure.  Final Diagnosis: colon polyps     Indication  Encounter for screening colonoscopy    Providers  Alejandra Lee, RN Registered Nurse   Vahe Rey MD Proceduralist   Tigist Raza RN  Technician   Darron Coffman II, CRNA CRNA     Medications  Moderate sedation administered by anesthesia staff - See anesthesia   record.    Preprocedure  A history and physical has been performed, and patient medication   allergies have been reviewed. The patient's tolerance of previous   anesthesia has been reviewed. The risks and benefits of the procedure and   the sedation options and risks were discussed with the patient. All   questions were answered and informed consent obtained.    ASA Score: ASA 3 - Patient with moderate systemic disease with functional   limitations  Mallampati Airway Score: II (hard and soft palate, upper portion of   tonsils anduvula visible)    Details of the Procedure  The patient underwent monitored anesthesia care, which was administered by   an anesthesia professional. The patient's heart rate, blood pressure,   level of consciousness, respirations, oxygen, ECG and ETCO2 were monitored   throughout the procedure. A digital rectal exam was performed. A perianal   exam was performed. The scope was introduced through the anus and advanced   to the cecum. Retroflexion was not performed due to endocuff. The quality   of bowel preparation was evaluated using the [place] Bowel Preparation   Scale with scores of: right colon = 2, transverse colon = 2, left colon =   2. The total BBPS score was 6. Bowel prep was adequate. The patient's   estimated blood loss was minimal (<5 mL). The procedure was not difficult.   The patient tolerated the procedure well. There were no apparent adverse   events.     Scope: Colonoscope  Scope Serial: [id number]    Events    Procedure Events   Event Event Time     Procedure Events   Event Event Time   ENDO SCOPE IN TIME 8/26/2024  9:52 AM   ENDO CECUM REACHED 8/26/2024  9:55 AM   ENDO SCOPE OUT TIME 8/26/2024 10:13 AM     CECAL WITHDRAWAL TIME: 17m 56s    Findings  Four sessile and adenomatous-appearing polyps measuring from 3 mm up to 15   mm in the ascending  colon; performed cold forceps biopsy with complete   removal; performed cold snare with complete en bloc removal and retrieved   specimen; performed hot snare with complete en bloc removal and retrieved   specimen  Three sessile and adenomatous-appearing polyps measuring from 5 mm up to   15 mm in the descending colon; performed cold snare with complete en bloc   removal and retrieved specimen; performed hot snare with complete en bloc   removal and retrieved specimen  Two sessile and adenomatous-appearing polyps measuring 5-9 mm in the   sigmoid colon; performed cold snare with complete en bloc removal and   retrieved specimen; performed hot snare with complete en bloc removal and   retrieved specimen. Pictures not saved  Few small and medium, scattered diverticula of mild severity in the   sigmoid colon; no bleeding was identified  The ileocecal valve, cecum and transverse colon appeared normal.  Internal (grade 2) hemorrhoids; no bleeding was identified         **Labs and x-rays personally reviewed by me    ** reviewed           Assessment & Plan:       1. Weakness  -     Basic Metabolic Panel; Future; Expected date: 10/21/2024  -     CBC Auto Differential; Future; Expected date: 10/21/2024  -     CK; Future; Expected date: 10/21/2024  -     Ambulatory referral/consult to Physical/Occupational Therapy; Future; Expected date: 10/28/2024    2. Polyarthralgia  -     pregabalin (LYRICA) 150 MG capsule; Take 1 capsule (150 mg total) by mouth 2 (two) times daily.  Dispense: 180 capsule; Refill: 3    3. Cervical spondylosis without myelopathy  -     pregabalin (LYRICA) 150 MG capsule; Take 1 capsule (150 mg total) by mouth 2 (two) times daily.  Dispense: 180 capsule; Refill: 3    4. Other chronic pain  -     Ambulatory referral/consult to Pain Clinic; Future; Expected date: 10/28/2024    Postmenopausal   Chronic dyslipidemia   Chronic hypertension   Chronic diabetes mellitus   GERD  Chronic urinary incontinence  -      oxybutynin (DITROPAN) 5 MG Tab; Take 1 tablet (5 mg total) by mouth every evening.  Dispense: 90 tablet; Refill: 1  -     aMILoride (MIDAMOR) 5 MG Tab; Take 1 tablet (5 mg total) by mouth once daily.  Dispense: 30 tablet; Refill: 1  -     pantoprazole (PROTONIX) 40 MG tablet; Take 1 tablet (40 mg total) by mouth once daily.  Dispense: 90 tablet; Refill: 1  -     glimepiride (AMARYL) 2 MG tablet; Take 1 tablet (2 mg total) by mouth every morning.  Dispense: 90 tablet; Refill: 1  -     valsartan (DIOVAN) 160 MG tablet; Take 1 tablet (160 mg total) by mouth every evening.  Dispense: 90 tablet; Refill: 1  -     rosuvastatin (CRESTOR) 10 MG tablet; Take 1 tablet (10 mg total) by mouth every evening.  Dispense: 90 tablet; Refill: 1  -     estrogens, conjugated, (PREMARIN) 1.25 MG tablet; Take 1 tablet (1.25 mg total) by mouth once daily.  Dispense: 90 tablet; Refill: 1            Obed Durant MD

## 2024-10-30 ENCOUNTER — CLINICAL SUPPORT (OUTPATIENT)
Dept: REHABILITATION | Facility: HOSPITAL | Age: 69
End: 2024-10-30
Attending: INTERNAL MEDICINE
Payer: MEDICARE

## 2024-10-30 DIAGNOSIS — U09.9 COVID-19 LONG HAULER MANIFESTING CHRONIC DECREASED MOBILITY AND ENDURANCE: Primary | ICD-10-CM

## 2024-10-30 DIAGNOSIS — R53.1 WEAKNESS: ICD-10-CM

## 2024-10-30 DIAGNOSIS — M79.10 MYALGIA: ICD-10-CM

## 2024-10-30 DIAGNOSIS — Z74.09 COVID-19 LONG HAULER MANIFESTING CHRONIC DECREASED MOBILITY AND ENDURANCE: Primary | ICD-10-CM

## 2024-10-30 DIAGNOSIS — Z74.09 DECREASED FUNCTIONAL MOBILITY AND ENDURANCE: ICD-10-CM

## 2024-10-30 PROCEDURE — 97110 THERAPEUTIC EXERCISES: CPT | Mod: PN

## 2024-10-30 PROCEDURE — 97161 PT EVAL LOW COMPLEX 20 MIN: CPT | Mod: PN

## 2024-11-06 ENCOUNTER — CLINICAL SUPPORT (OUTPATIENT)
Dept: REHABILITATION | Facility: HOSPITAL | Age: 69
End: 2024-11-06
Payer: MEDICARE

## 2024-11-06 DIAGNOSIS — U09.9 COVID-19 LONG HAULER MANIFESTING CHRONIC DECREASED MOBILITY AND ENDURANCE: ICD-10-CM

## 2024-11-06 DIAGNOSIS — R53.1 WEAKNESS: ICD-10-CM

## 2024-11-06 DIAGNOSIS — Z74.09 COVID-19 LONG HAULER MANIFESTING CHRONIC DECREASED MOBILITY AND ENDURANCE: ICD-10-CM

## 2024-11-06 DIAGNOSIS — Z74.09 DECREASED FUNCTIONAL MOBILITY AND ENDURANCE: Primary | ICD-10-CM

## 2024-11-06 PROCEDURE — 97530 THERAPEUTIC ACTIVITIES: CPT | Mod: PN,CQ

## 2024-11-06 PROCEDURE — 97112 NEUROMUSCULAR REEDUCATION: CPT | Mod: PN,CQ

## 2024-11-06 NOTE — PROGRESS NOTES
Physical Therapy Treatment Note     Name: Amina Israel  Clinic Number: [id number]    Therapy Diagnosis: No diagnosis found.  Physician: Obed Durant MD    Visit Date: 11/6/2024  Physician Orders: PT Eval and Treat    Medical Diagnosis from Referral: post covid weakness, fatigue , myalgia   Evaluation Date: 10/30/2024  Updated Plan of Care Due : 12/30/2024   Authorization Period Expiration: [facility] [employer] advantage   Plan of Care Expiration: 12/30/2024   Visit # / Visits authorized: 2/ 16  PTA Visit #: 1    Time In: 1230  Time Out: 110  Total Billable Time: 40 minutes    Precautions: Standard    Subjective     Pt reports: I was able to walk a mile the other day. My back is hurting a little, but I think its gas.  She was compliant with home exercise program.  Response to previous treatment: improved motion  Functional change: ongoing    Pain: 2/10  Location: bilateral generalized      Objective       Amina participated in neuromuscular re-education activities to improve: Balance, Coordination, Kinesthetic, Sense, Proprioception, and Posture for 15 minutes. The following activities were included:  Slant board x 2'  Bilateral hamstrings stretch on step x 5  Hip adduction with bolster and bridging x 10  Bilateral prone hip extension x 10      Amina participated in dynamic functional therapeutic activities to improve functional performance for 25  minutes, including:  Biking x 5' to promote endurance with walking  Bilateral hip abduction and adduction 10 x 20# to promote pelvic stability with walking  Double support leg presses 20 x 60# to simulate bending and squatting  Double support leg presses 20 x 60# to simulate reaching on tip toes  Double support squats total gym x 20 to simulate bending and squatting  Double support calf raises total gym x 20 to simulate reaching on tip toes  Bilateral straight leg raise x 10 to promote getting in and out of tub  Bilateral ankle alphabet with 2.5# to promote walking on  unlevel outdoor surfaces  Bilateral sitting hip flexion x 10 to promote getting in and out of car    Home Exercises Provided and Patient Education Provided     Education provided: home exercise program     Written Home Exercises Provided: Patient instructed to cont prior HEP.  Exercises were reviewed and Amina was able to demonstrate them prior to the end of the session.  Amina demonstrated good  understanding of the education provided.     See EMR under Patient Instructions for exercises provided prior visit.    Assessment     Patient had improved endurance  and strength with activities .  Amina Is progressing well towards her goals.   Pt prognosis is Excellent.     Pt will continue to benefit from skilled outpatient physical therapy to address the deficits listed in the problem list box on initial evaluation, provide pt/family education and to maximize pt's level of independence in the home and community environment.     Pt's spiritual, cultural and educational needs considered and pt agreeable to plan of care and goals.     Anticipated barriers to physical therapy: compliance with home exercise program     Goals:    Short Term Goals: 4  weeks   Pt will be independent with home ex program   Pt will be able to increase all lower extremity strength to 4/5   Be able to get up from the floor independently   Be able to shop x 30 min .      Long Term Goals: 8  weeks   Pt will be able to increase lower extremity strength to 5/5   Be able to perform all adl/s pain free   Be able to shop x 1 hour     Plan   Plan of care Certification: 10/30/2024 to 12/30/2024 .     Outpatient Physical Therapy 2 times weekly for 8 weeks to include the following interventions: Manual Therapy, Neuromuscular Re-ed, Patient Education, Therapeutic Activities, and Therapeutic Exercise. Plan of care reviewed with Fausto Soto, PT.      Florinda Solitario, PTA  11/6/2024

## 2024-11-11 ENCOUNTER — CLINICAL SUPPORT (OUTPATIENT)
Dept: REHABILITATION | Facility: HOSPITAL | Age: 69
End: 2024-11-11
Payer: MEDICARE

## 2024-11-11 DIAGNOSIS — Z74.09 COVID-19 LONG HAULER MANIFESTING CHRONIC DECREASED MOBILITY AND ENDURANCE: ICD-10-CM

## 2024-11-11 DIAGNOSIS — Z74.09 DECREASED FUNCTIONAL MOBILITY AND ENDURANCE: Primary | ICD-10-CM

## 2024-11-11 DIAGNOSIS — U09.9 COVID-19 LONG HAULER MANIFESTING CHRONIC DECREASED MOBILITY AND ENDURANCE: ICD-10-CM

## 2024-11-11 PROCEDURE — 97112 NEUROMUSCULAR REEDUCATION: CPT | Mod: PN,CQ

## 2024-11-11 PROCEDURE — 97530 THERAPEUTIC ACTIVITIES: CPT | Mod: PN,CQ

## 2024-11-11 NOTE — PROGRESS NOTES
Physical Therapy Treatment Note     Name: Amina Israel  Clinic Number: [id number]    Therapy Diagnosis: No diagnosis found.  Physician: Obed Durant MD    Visit Date: 11/11/2024  Physician Orders: PT Eval and Treat    Medical Diagnosis from Referral: post covid weakness, fatigue , myalgia   Evaluation Date: 10/30/2024  Updated Plan of Care Due : 12/30/2024   Authorization Period Expiration: [facility] [employer]   Plan of Care Expiration: 12/30/2024   Visit # / Visits authorized: 3/ 16  PTA Visit #: 2    Time In: 1230  Time Out: 115  Total Billable Time: 45 minutes    Precautions: Standard    Subjective     Pt reports: I walked 2 miles today. That gas pain in my back finally got a little better over the weekend.  She was compliant with home exercise program.  Response to previous treatment: improved motion  Functional change: ongoing    Pain: 3/10  Location: bilateral generalized      Objective     Amina participated in neuromuscular re-education activities to improve: Balance, Coordination, Kinesthetic, Sense, Proprioception, and Posture for 15 minutes. The following activities were included:  Slant board x 2'  Bilateral hamstrings stretch on step x 5  Hip adduction with bolster and bridging x 10    Amina participated in dynamic functional therapeutic activities to improve functional performance for 30  minutes, including:  Biking x 5' to promote endurance with walking  Bilateral hip abduction and adduction 10 x 20# to promote pelvic stability with walking  Double support leg presses 20 x 60# to simulate bending and squatting  Double support leg presses 20 x 60# to simulate reaching on tip toes  Double support squats total gym x 20 to simulate bending and squatting  Double support calf raises total gym x 20 to simulate reaching on tip toes  Bilateral straight leg raise x 10 to promote getting in and out of tub  Bilateral ankle alphabet with 3# to promote walking on unlevel outdoor surfaces    Home  Exercises Provided and Patient Education Provided     Education provided: home exercise program     Written Home Exercises Provided: Patient instructed to cont prior HEP.  Exercises were reviewed and Amina was able to demonstrate them prior to the end of the session.  Amina demonstrated good  understanding of the education provided.     See EMR under Patient Instructions for exercises provided prior visit.    Assessment     Patient progressing with endurance and demonstrated independence with getting up from floor.  Amina Is progressing well towards her goals.   Pt prognosis is Excellent.     Pt will continue to benefit from skilled outpatient physical therapy to address the deficits listed in the problem list box on initial evaluation, provide pt/family education and to maximize pt's level of independence in the home and community environment.     Pt's spiritual, cultural and educational needs considered and pt agreeable to plan of care and goals.     Anticipated barriers to physical therapy: compliance with home exercise program     Goals:  Short Term Goals: 4  weeks   Pt will be independent with home ex program-met   Pt will be able to increase all lower extremity strength to 4/5 -met  Be able to get up from the floor independently -met  Be able to shop x 30 minimum-met      Long Term Goals: 8  weeks   Pt will be able to increase lower extremity strength to 5/5   Be able to perform all adl/s pain free   Be able to shop x 1 hour     Plan   Plan of care Certification: 10/30/2024 to 12/30/2024 .     Outpatient Physical Therapy 2 times weekly for 8 weeks to include the following interventions: Manual Therapy, Neuromuscular Re-ed, Patient Education, Therapeutic Activities, and Therapeutic Exercise. Plan of care reviewed with Fausto Soto, PT.      Florinda Solitario, PTA  11/11/2024

## 2024-11-13 ENCOUNTER — CLINICAL SUPPORT (OUTPATIENT)
Dept: REHABILITATION | Facility: HOSPITAL | Age: 69
End: 2024-11-13
Payer: MEDICARE

## 2024-11-13 DIAGNOSIS — Z74.09 DECREASED FUNCTIONAL MOBILITY AND ENDURANCE: ICD-10-CM

## 2024-11-13 DIAGNOSIS — R53.1 WEAKNESS: ICD-10-CM

## 2024-11-13 DIAGNOSIS — M25.612 DECREASED RANGE OF MOTION OF SHOULDER, LEFT: Primary | ICD-10-CM

## 2024-11-13 PROCEDURE — 97112 NEUROMUSCULAR REEDUCATION: CPT | Mod: PN,CQ

## 2024-11-13 PROCEDURE — 97530 THERAPEUTIC ACTIVITIES: CPT | Mod: PN,CQ

## 2024-11-13 NOTE — PROGRESS NOTES
Physical Therapy Treatment Note     Name: Amina Israel  Clinic Number: [id number]    Therapy Diagnosis:   Encounter Diagnoses   Name Primary?    Decreased range of motion of shoulder, left Yes    Decreased functional mobility and endurance     Weakness      Physician: Obed Durant MD    Visit Date: 11/13/2024  Physician Orders: PT Eval and Treat    Medical Diagnosis from Referral: post covid weakness, fatigue , myalgia   Evaluation Date: 10/30/2024  Updated Plan of Care Due : 12/30/2024   Authorization Period Expiration: [facility] [employer]   Plan of Care Expiration: 12/30/2024   Visit # / Visits authorized: 4/ 16  PTA Visit #: 3    Time In: 1230  Time Out: 115  Total Billable Time: 45 minutes    Precautions: Standard    Subjective     Pt reports: II have a little pain along the right side of my low back/hip. I got a mile walk in already today.  She was compliant with home exercise program.  Response to previous treatment: improved motion  Functional change: ongoing    Pain: 1/10  Location: bilateral generalized      Objective     Amina participated in neuromuscular re-education activities to improve: Balance, Coordination, Kinesthetic, Sense, Proprioception, and Posture for 15 minutes. The following activities were included:  Slant board x 2'  Bilateral hamstrings stretch on step x 5  Hip adduction with bolster and bridging x 10  Bilateral side bend stretch x 5    Amina participated in dynamic functional therapeutic activities to improve functional performance for 30  minutes, including:  Biking x 5' to promote endurance with walking  Bilateral hip abduction and adduction 10 x 30# to promote pelvic stability with walking  Double support leg presses 20 x 70# to simulate bending and squatting  Double support leg presses 20 x 70# to simulate reaching on tip toes  Double support squats total gym x 20 to simulate bending and squatting  Double support calf raises total gym x 20 to simulate reaching on tip  toes  Bilateral straight leg raise x 10 to promote getting in and out of tub  Bilateral ankle dorsiflexion  20 x blue theraband to promote balance  on unlevel outdoor surfaces  Swissball sitting swissball trunk roll outs x 10 to promote donning/doffing shoes    Home Exercises Provided and Patient Education Provided     Education provided: home exercise program     Written Home Exercises Provided: Patient instructed to cont prior HEP.  Exercises were reviewed and Amina was able to demonstrate them prior to the end of the session.  Amina demonstrated good  understanding of the education provided.     See EMR under Patient Instructions for exercises provided prior visit.    Assessment     Patient progressing with lower extremity strengthening and endurance.  Amina Is progressing well towards her goals.   Pt prognosis is Excellent.     Pt will continue to benefit from skilled outpatient physical therapy to address the deficits listed in the problem list box on initial evaluation, provide pt/family education and to maximize pt's level of independence in the home and community environment.     Pt's spiritual, cultural and educational needs considered and pt agreeable to plan of care and goals.     Anticipated barriers to physical therapy: compliance with home exercise program     Goals:  Short Term Goals: 4  weeks   Pt will be independent with home ex program-met   Pt will be able to increase all lower extremity strength to 4/5 -met  Be able to get up from the floor independently -met  Be able to shop x 30 minimum-met      Long Term Goals: 8  weeks   Pt will be able to increase lower extremity strength to 5/5 -met  Be able to perform all adl/s pain free-met   Be able to shop x 1 hour -met    Plan   Plan of care Certification: 10/30/2024 to 12/30/2024 .     Outpatient Physical Therapy 2 times weekly for 8 weeks to include the following interventions: Manual Therapy, Neuromuscular Re-ed, Patient Education, Therapeutic  Activities, and Therapeutic Exercise. Plan of care reviewed with Fausto Soto, PT.      Florinda Solitario, PTA  11/13/2024

## 2024-11-22 ENCOUNTER — CLINICAL SUPPORT (OUTPATIENT)
Dept: REHABILITATION | Facility: HOSPITAL | Age: 69
End: 2024-11-22
Payer: MEDICARE

## 2024-11-22 DIAGNOSIS — R53.1 WEAKNESS: Primary | ICD-10-CM

## 2024-11-22 DIAGNOSIS — Z74.09 DECREASED FUNCTIONAL MOBILITY AND ENDURANCE: ICD-10-CM

## 2024-11-22 PROCEDURE — 97530 THERAPEUTIC ACTIVITIES: CPT | Mod: PN

## 2024-11-22 PROCEDURE — 97112 NEUROMUSCULAR REEDUCATION: CPT | Mod: PN

## 2024-11-22 NOTE — PROGRESS NOTES
Physical Therapy Treatment Note     Name: Amina Israel  Clinic Number: [id number]    Therapy Diagnosis:   No diagnosis found.    Physician: Obed Durant MD    Visit Date: 11/22/2024  Physician Orders: PT Eval and Treat    Medical Diagnosis from Referral: post covid weakness, fatigue , myalgia   Evaluation Date: 10/30/2024  Updated Plan of Care Due : 12/30/2024   Authorization Period Expiration: [facility] [employer] advantage   Plan of Care Expiration: 12/30/2024   Visit # / Visits authorized: 5/ 16  PTA Visit #:     Time In: 1230  Time Out: 115  Total Billable Time: 45 minutes    Precautions: Standard    Subjective     Pt reports: II have a little pain along the right side of my low back/hip. I got a mile walk in already today.  She was compliant with home exercise program.  Response to previous treatment: improved motion  Functional change: ongoing    Pain: 1/10  Location: bilateral generalized      Objective     Amina participated in neuromuscular re-education activities to improve: Balance, Coordination, Kinesthetic, Sense, Proprioception, and Posture for 15 minutes. The following activities were included:  Slant board x 2'  Bilateral hamstrings stretch on step x 5  Hip adduction with bolster and bridging x 10  Bilateral side bend stretch x 5    Amina participated in dynamic functional therapeutic activities to improve functional performance for 30  minutes, including:  Biking x 5' to promote endurance with walking  Bilateral hip abduction and adduction 10 x 30# to promote pelvic stability with walking  Double support leg presses 20 x 70# to simulate bending and squatting  Double support leg presses 20 x 70# to simulate reaching on tip toes  Double support squats total gym x 20 to simulate bending and squatting  Double support calf raises total gym x 20 to simulate reaching on tip toes  Bilateral straight leg raise x 10 to promote getting in and out of tub  Bilateral ankle dorsiflexion  20 x blue theraband to  promote balance  on unlevel outdoor surfaces  Swissball sitting swissball trunk roll outs x 10 to promote donning/doffing shoes    Home Exercises Provided and Patient Education Provided     Education provided: home exercise program     Written Home Exercises Provided: Patient instructed to cont prior HEP.  Exercises were reviewed and Amina was able to demonstrate them prior to the end of the session.  Amina demonstrated good  understanding of the education provided.     See EMR under Patient Instructions for exercises provided prior visit.    Assessment     Patient progressing with lower extremity strengthening and endurance.  Amina Is progressing well towards her goals.   Pt prognosis is Excellent.     Pt will continue to benefit from skilled outpatient physical therapy to address the deficits listed in the problem list box on initial evaluation, provide pt/family education and to maximize pt's level of independence in the home and community environment.     Pt's spiritual, cultural and educational needs considered and pt agreeable to plan of care and goals.     Anticipated barriers to physical therapy: compliance with home exercise program     Goals:  Short Term Goals: 4  weeks   Pt will be independent with home ex program-met   Pt will be able to increase all lower extremity strength to 4/5 -met  Be able to get up from the floor independently -met  Be able to shop x 30 minimum-met      Long Term Goals: 8  weeks   Pt will be able to increase lower extremity strength to 5/5 -met  Be able to perform all adl/s pain free-met   Be able to shop x 1 hour -met    Plan   Plan of care Certification: 10/30/2024 to 12/30/2024 .     Outpatient Physical Therapy 2 times weekly for 8 weeks to include the following interventions: Manual Therapy, Neuromuscular Re-ed, Patient Education, Therapeutic Activities, and Therapeutic Exercise.     Plan of care has been reestablished with Leslie MEYERS and  Ny MEYERS,        Fausto Soto, PT  11/22/2024

## 2024-12-11 ENCOUNTER — PATIENT MESSAGE (OUTPATIENT)
Dept: GASTROENTEROLOGY | Facility: CLINIC | Age: 69
End: 2024-12-11
Payer: MEDICARE

## 2024-12-26 PROBLEM — R53.1 WEAKNESS: Status: RESOLVED | Noted: 2024-10-30 | Resolved: 2024-12-26

## 2024-12-26 PROBLEM — M25.612 DECREASED RANGE OF MOTION OF SHOULDER, LEFT: Status: RESOLVED | Noted: 2021-10-19 | Resolved: 2024-12-26

## 2024-12-26 PROBLEM — Z74.09 DECREASED FUNCTIONAL MOBILITY AND ENDURANCE: Status: RESOLVED | Noted: 2024-10-30 | Resolved: 2024-12-26

## 2025-01-07 RX ORDER — ATENOLOL 25 MG/1
75 TABLET ORAL
Qty: 90 TABLET | Refills: 3 | Status: SHIPPED | OUTPATIENT
Start: 2025-01-07

## 2025-01-07 RX ORDER — PANTOPRAZOLE SODIUM 40 MG/1
40 TABLET, DELAYED RELEASE ORAL
Qty: 90 TABLET | Refills: 1 | Status: SHIPPED | OUTPATIENT
Start: 2025-01-07

## 2025-01-07 RX ORDER — FLUCONAZOLE 200 MG/1
200 TABLET ORAL
Qty: 7 TABLET | Refills: 1 | Status: SHIPPED | OUTPATIENT
Start: 2025-01-07

## 2025-02-07 ENCOUNTER — OFFICE VISIT (OUTPATIENT)
Dept: DERMATOLOGY | Facility: CLINIC | Age: 70
End: 2025-02-07
Payer: MEDICARE

## 2025-02-07 DIAGNOSIS — L30.9 DERMATITIS, UNSPECIFIED: ICD-10-CM

## 2025-02-07 DIAGNOSIS — D48.5 NEOPLASM OF UNCERTAIN BEHAVIOR OF SKIN: Primary | ICD-10-CM

## 2025-02-07 PROCEDURE — 99203 OFFICE O/P NEW LOW 30 MIN: CPT | Mod: 25,,, | Performed by: STUDENT IN AN ORGANIZED HEALTH CARE EDUCATION/TRAINING PROGRAM

## 2025-02-07 PROCEDURE — 11104 PUNCH BX SKIN SINGLE LESION: CPT | Mod: ,,, | Performed by: STUDENT IN AN ORGANIZED HEALTH CARE EDUCATION/TRAINING PROGRAM

## 2025-02-07 PROCEDURE — 4010F ACE/ARB THERAPY RXD/TAKEN: CPT | Mod: CPTII,,, | Performed by: STUDENT IN AN ORGANIZED HEALTH CARE EDUCATION/TRAINING PROGRAM

## 2025-02-07 PROCEDURE — 1159F MED LIST DOCD IN RCRD: CPT | Mod: CPTII,,, | Performed by: STUDENT IN AN ORGANIZED HEALTH CARE EDUCATION/TRAINING PROGRAM

## 2025-02-07 PROCEDURE — 88305 TISSUE EXAM BY PATHOLOGIST: CPT | Mod: TC,SUR | Performed by: STUDENT IN AN ORGANIZED HEALTH CARE EDUCATION/TRAINING PROGRAM

## 2025-02-07 PROCEDURE — 88305 TISSUE EXAM BY PATHOLOGIST: CPT | Mod: 26,,, | Performed by: PATHOLOGY

## 2025-02-07 NOTE — PROGRESS NOTES
[facility]  Rios Gustafson MD    [address] [place] [address]  [phone]    Fax: [phone]    Patient Name: Amina Israel  Medical Record Number: [id number]  PCP: Marquez Burch MD  Age: 69 y.o. : 1955  Contact: [phone] (home)     CC: hypopigmentation   History of Present Illness:     Amina Israel is a 69 y.o.  female with no history of skin cancer who presents to clinic today for tender nodules on elbows and hands, and a pruritic rash on her lower back and under bilateral breast that becomes pruritic when sweating.       Of note, she is diabetic, denies hx of lupus.  The patient has no other concerns today.    Review of Systems:     Unremarkable other than mentioned above.     Physical Exam:     General: Relaxed, oriented, alert    Skin examination of the scalp, face, neck, chest, back, abdomen, upper extremities and lower extremities were normal except for as listed below      Assessment and Plan:     1. Neoplasm of uncertain behavior   - skin colored subcutaneous nodules on the bilateral elbows and left hand  DDx: subcutaneous granuloma annulare vs erythema elavatum diutinium vs sarcoidosis     Punch Biopsy     Pre-procedure Diagnosis: same  Post_procedure Diagnosis: same  Estimated Blood Loss: 1cc    Findings: None  Complications: None  Specimens: to pathology    Written informed consent was obtained after discussing risks including pain, bleeding, infection, recurrence and scarring. The biopsy site was sterilely prepped with alcohol, which was allowed to dry completely, then locally infiltrated with 1% lidocaine with epinephrine, ~3 cc total. A punch biopsy was obtained using a 4 size punch and the specimen was sent to dermatopathology. 1 suture was placed for hemostasis. Petrolatum ointment and a clean dressing were applied. The patient tolerated the procedure well without complications. Verbal and written wound care instructions were given.  Sutures should be removed in 10-12 days/discuss SR results       2. Dermatitis Unspecified  - eczematous lichenified patch on mid back   DDx: possibly lichen simplex chronicus. Will await biopsy results from above to determine treatment as this could be related     Plan: Counseling  I counseled the patient regarding the following:  Skin care: Patient instructed to use gentle skin care including dove unscented soap, CeraVe moisturizing cream, and fragrance free laundry detergent.  Expectations: The patient understands that there is not a definitive diagnosis at this time. Further testing or empiric therapy may be necessary to diagnose and improve the condition.  Contact office if: The patient develops a fever, or rash dramatically worsens despite treatment.          Rios Gustafson MD   Mohs Surgery/Dermatologic Oncology  Dermatology

## 2025-02-10 ENCOUNTER — TELEPHONE (OUTPATIENT)
Dept: GASTROENTEROLOGY | Facility: CLINIC | Age: 70
End: 2025-02-10
Payer: MEDICARE

## 2025-02-10 LAB
DHEA SERPL-MCNC: NORMAL
ESTROGEN SERPL-MCNC: NORMAL PG/ML
INSULIN SERPL-ACNC: NORMAL U[IU]/ML
LAB AP GROSS DESCRIPTION: NORMAL
LAB AP LABORATORY NOTES: NORMAL
LAB AP SPEC A DDX: NORMAL
LAB AP SPEC A MORPHOLOGY: NORMAL
LAB AP SPEC A PROCEDURE: NORMAL
T3RU NFR SERPL: NORMAL %

## 2025-02-10 NOTE — TELEPHONE ENCOUNTER
Spoke with patient, confirmed date and time of EGD.  Instructed patient to remain NPO as of midnight and to bring a .

## 2025-02-17 ENCOUNTER — OFFICE VISIT (OUTPATIENT)
Dept: FAMILY MEDICINE | Facility: CLINIC | Age: 70
End: 2025-02-17
Payer: MEDICARE

## 2025-02-17 ENCOUNTER — CLINICAL SUPPORT (OUTPATIENT)
Dept: DERMATOLOGY | Facility: CLINIC | Age: 70
End: 2025-02-17
Payer: MEDICARE

## 2025-02-17 ENCOUNTER — APPOINTMENT (OUTPATIENT)
Dept: RADIOLOGY | Facility: CLINIC | Age: 70
End: 2025-02-17
Attending: INTERNAL MEDICINE
Payer: MEDICARE

## 2025-02-17 ENCOUNTER — RESULTS FOLLOW-UP (OUTPATIENT)
Dept: DERMATOLOGY | Facility: CLINIC | Age: 70
End: 2025-02-17

## 2025-02-17 VITALS
SYSTOLIC BLOOD PRESSURE: 166 MMHG | WEIGHT: 178 LBS | OXYGEN SATURATION: 98 % | HEART RATE: 83 BPM | DIASTOLIC BLOOD PRESSURE: 94 MMHG | BODY MASS INDEX: 27.94 KG/M2 | RESPIRATION RATE: 17 BRPM | HEIGHT: 67 IN | TEMPERATURE: 97 F

## 2025-02-17 DIAGNOSIS — M25.50 POLYARTHRALGIA: Chronic | ICD-10-CM

## 2025-02-17 DIAGNOSIS — M25.552 PAIN OF LEFT HIP: ICD-10-CM

## 2025-02-17 DIAGNOSIS — R00.2 PALPITATIONS: Primary | ICD-10-CM

## 2025-02-17 DIAGNOSIS — M47.812 CERVICAL SPONDYLOSIS WITHOUT MYELOPATHY: Chronic | ICD-10-CM

## 2025-02-17 DIAGNOSIS — E11.9 TYPE 2 DIABETES MELLITUS WITHOUT COMPLICATION, UNSPECIFIED WHETHER LONG TERM INSULIN USE: ICD-10-CM

## 2025-02-17 DIAGNOSIS — L92.0 GRANULOMA ANNULARE: Primary | ICD-10-CM

## 2025-02-17 LAB
ANION GAP SERPL CALCULATED.3IONS-SCNC: 15 MMOL/L (ref 7–16)
BUN SERPL-MCNC: 9 MG/DL (ref 10–20)
BUN/CREAT SERPL: 12 (ref 6–20)
CALCIUM SERPL-MCNC: 9.4 MG/DL (ref 8.4–10.2)
CHLORIDE SERPL-SCNC: 105 MMOL/L (ref 98–107)
CO2 SERPL-SCNC: 24 MMOL/L (ref 23–31)
CREAT SERPL-MCNC: 0.75 MG/DL (ref 0.55–1.02)
EGFR (NO RACE VARIABLE) (RUSH/TITUS): 87 ML/MIN/1.73M2
EST. AVERAGE GLUCOSE BLD GHB EST-MCNC: 111 MG/DL
GLUCOSE SERPL-MCNC: 80 MG/DL (ref 82–115)
HBA1C MFR BLD HPLC: 5.5 %
POTASSIUM SERPL-SCNC: 4.5 MMOL/L (ref 3.5–5.1)
SODIUM SERPL-SCNC: 139 MMOL/L (ref 136–145)
TROPONIN I SERPL HS-MCNC: <2.7 NG/L

## 2025-02-17 PROCEDURE — 73560 X-RAY EXAM OF KNEE 1 OR 2: CPT | Mod: TC,RHCUB,RT | Performed by: INTERNAL MEDICINE

## 2025-02-17 RX ORDER — AMILORIDE HYDROCHLORIDE 5 MG/1
5 TABLET ORAL DAILY
Qty: 30 TABLET | Refills: 1 | Status: SHIPPED | OUTPATIENT
Start: 2025-02-17

## 2025-02-17 RX ORDER — NAPROXEN 500 MG/1
500 TABLET ORAL 2 TIMES DAILY PRN
Qty: 30 TABLET | Refills: 1 | Status: SHIPPED | OUTPATIENT
Start: 2025-02-17

## 2025-02-17 RX ORDER — PREGABALIN 150 MG/1
150 CAPSULE ORAL 2 TIMES DAILY
Qty: 180 CAPSULE | Refills: 1 | Status: SHIPPED | OUTPATIENT
Start: 2025-02-17

## 2025-02-17 RX ORDER — ESTRADIOL 0.1 MG/G
1 CREAM VAGINAL
Qty: 42.5 G | Refills: 1 | Status: SHIPPED | OUTPATIENT
Start: 2025-02-17

## 2025-02-17 RX ORDER — KETOROLAC TROMETHAMINE 30 MG/ML
15 INJECTION, SOLUTION INTRAMUSCULAR; INTRAVENOUS
Status: COMPLETED | OUTPATIENT
Start: 2025-02-17 | End: 2025-02-17

## 2025-02-17 RX ORDER — DOCUSATE SODIUM 100 MG/1
100 CAPSULE, LIQUID FILLED ORAL 2 TIMES DAILY
Qty: 30 CAPSULE | Refills: 0 | Status: SHIPPED | OUTPATIENT
Start: 2025-02-17

## 2025-02-17 RX ORDER — VALSARTAN 160 MG/1
160 TABLET ORAL NIGHTLY
Qty: 90 TABLET | Refills: 1 | Status: SHIPPED | OUTPATIENT
Start: 2025-02-17

## 2025-02-17 RX ORDER — ROSUVASTATIN CALCIUM 10 MG/1
10 TABLET, COATED ORAL NIGHTLY
Qty: 90 TABLET | Refills: 1 | Status: SHIPPED | OUTPATIENT
Start: 2025-02-17

## 2025-02-17 RX ORDER — GLIMEPIRIDE 2 MG/1
2 TABLET ORAL EVERY MORNING
Qty: 90 TABLET | Refills: 1 | Status: SHIPPED | OUTPATIENT
Start: 2025-02-17

## 2025-02-17 RX ORDER — OXYBUTYNIN CHLORIDE 5 MG/1
5 TABLET ORAL NIGHTLY
Qty: 90 TABLET | Refills: 1 | Status: SHIPPED | OUTPATIENT
Start: 2025-02-17

## 2025-02-17 RX ADMIN — KETOROLAC TROMETHAMINE 15 MG: 30 INJECTION, SOLUTION INTRAMUSCULAR; INTRAVENOUS at 11:02

## 2025-02-17 NOTE — PROGRESS NOTES
[facility]  Rios Gustafson MD    [facility], [place] [address]  [phone]    Fax: [phone]    Patient Name: Amina Israel  Medical Record Number: [id number]  PCP: Marquez Burch MD  Age: 69 y.o. : 1955  Contact: [phone] (home)     History of Present Illness:     Amina Israel is a 69 y.o.  female here for SR and to discuss punch biopsy results which shown granuloma annulare. These are pruritic and painful on her elbows and hands.     The patient has no other concerns today.    Review of Systems:     Unremarkable other than mentioned above.     Physical Exam:     General: Relaxed, oriented, alert    Skin examination of the scalp, face, neck, chest, back, abdomen, upper extremities and lower extremities were normal except for as listed below      Assessment and Plan:     1. Granuloma Annulare   - focal infiltrative orange-yellow papules coalescing into annular plaques    Plan: Intralesional Kenalog    Treatment Number: 1  Lesions Injected: 9  Medication Injected: 10 mg/cc of Kenalog  Total Volume Injected: 1 cc  Lot Number: [id number]  Expiration Date: 2026  NDC #: 1087-6929-14  Administered by: Rios Gustafson MD     The risks of atrophy were reviewed with the patient.      Counseling  I counseled the patient regarding the following:  Skin care: Treatments with topical steroids, intralesional steroids can be helpful.  Granuloma Annulare is an inflammatory granulomatous process that is usually self-limited. Patient aware that cause is unknown. It can be associated with diabetes.         2. Suture removal of L elbow    Plan :  SR was removed while doing bandage change per patient, wound inspected no note suture remained, area cleansed no s/s of infection noted.         Rios Gustafson MD   Mohs Surgery/Dermatologic Oncology  Dermatology

## 2025-02-18 ENCOUNTER — RESULTS FOLLOW-UP (OUTPATIENT)
Dept: FAMILY MEDICINE | Facility: CLINIC | Age: 70
End: 2025-02-18
Payer: MEDICARE

## 2025-02-19 NOTE — PROGRESS NOTES
"Subjective:       Patient ID: Amina Israel is a 69 y.o. female.    Chief Complaint: Fall    History of Present Illness    CHIEF COMPLAINT:  Patient presents today for evaluation after a fall yesterday    INJURY DETAILS:  She fell backwards onto her left hip. She experiences pain in the left hip and lateral buttocks, which is improving but remains sore. She denies visible bruising on the hip despite typically bruising easily. After sleeping, her right knee stiffened and she was initially unable to bear weight when waking. The right knee has visible bruising, pain with movement (particularly when getting up and down), and limited extension.    MEDICAL HISTORY:  She has a history of constipation with pain medication use and expresses concern about potential bowel movement difficulties with prescribed pain medication.         Current Medications:  Current Medications[1]           ROS  Twelve point system reviewed, unremarkable except for stated above in HPI.        Objective:         Vitals:    02/17/25 1105 02/17/25 1106   BP: (!) 151/81 (!) 166/94   BP Location: Right arm    Patient Position: Sitting    Pulse: 83    Resp: 17    Temp: 97.4 °F (36.3 °C)    TempSrc: Temporal    SpO2: 98%    Weight: 80.7 kg (178 lb)    Height: 5' 7" (1.702 m)         Physical Exam     Patient is awake alert oriented person place and  Lungs are clear to auscultation bilaterally no crackles or wheezes   Cardiovascular S1-S2 regular rate and rhythm no murmurs rubs or gallops   Abdomen is soft positive bowel sounds nontender, extremities no clubbing cyanosis edema  Neuro no focal neurological deficits  Skin warm and dry.     Last Labs:     Office Visit on 02/17/2025   Component Date Value    Sodium 02/17/2025 139     Potassium 02/17/2025 4.5     Chloride 02/17/2025 105     CO2 02/17/2025 24     Anion Gap 02/17/2025 15     Glucose 02/17/2025 80 (L)     BUN 02/17/2025 9 (L)     Creatinine 02/17/2025 0.75     BUN/Creatinine Ratio 02/17/2025 12  "    Calcium 02/17/2025 9.4     eGFR 02/17/2025 87     Troponin I High Sensitiv* 02/17/2025 <2.7     Hemoglobin A1C 02/17/2025 5.5     Estimated Average Glucose 02/17/2025 111    Office Visit on 02/07/2025   Component Date Value    Final Diagnosis 02/07/2025                      Value:A. 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 Necrobiosis lipoidica and other entities are less likely. Clinical correlation is suggested.      Specimen A Procedure 02/07/2025 Punch Biopsy     Specimen A Morphology 02/07/2025 skin colored subcutaneous nodules     Specimen A DDx 02/07/2025 subcutaneous granuloma annulare vs erythema elavatum diutinium vs sarcoidosis     Microscopic Description 02/07/2025                      Value:A microscopic examination was performed and the diagnosis reflects the findings.          Gross Description 02/07/2025                      Value:A. Elbow, Left:   The specimen is received in formalin designated Elbow, Left and consists of a single circular piece of tan-brown skin consistent with a punch biopsy that measures 0.4 cm in greatest diameter excised to a depth of 0.8 cm.  The skin surface is unremarkable.  The margins are marked with black ink and the specimen is bisected and submitted entirely in cassette A1.    Grossing was completed by Ye Roman.      Laboratory Notes 02/07/2025                      Value:If this report includes immunohistochemical (IHC) test results, please note the following: IHC studies were interpreted in conjunction with appropriate positive and negative controls which demonstrate the expected positive and negative reactivity. This laboratory is regulated under CLIA as qualified to perform high-complexity testing. IHC tests are used for clinical purposes. They should not be  regarded as investigational or research.        Case Report 02/07/2025                      Value:Surgical Pathology                                Case: [id number]                                   Authorizing Provider:  Allie Gustafson MD       Collected:           02/07/2025 11:13 AM          Ordering Location:     [facility]         Received:            02/07/2025 03:13 PM                                 Dermatology                                                                  Pathologist:           Gerardo Zuniga MD                                                      Specimen:    Elbow, Left                                                                                   Last Imaging:  X-Ray Hip 2 or 3 views Left with Pelvis when performed  Narrative: EXAMINATION:  XR HIP WITH PELVIS WHEN PERFORMED 2 OR 3 VIEWS LEFT    CLINICAL HISTORY:  Pain in left hip    TECHNIQUE:  AP view of the pelvis and frog leg lateral view of the left hip were performed.    COMPARISON:  None    FINDINGS:  No hip joint space narrowing.  There is bilateral lateral acetabular enthesophyte/osteophyte formation.  There is bilateral femoral neck rim osteophyte formation, left greater than right.  There is subcortical cyst formation observed in the left and right femoral head.  There are serpentine areas of sclerosis present within both femoral heads.  Osteonecrosis of the femoral heads cannot be excluded.  Consider additional investigation with MRI of the hips.  There is right gluteus tendon insertion calcific tendinitis at the right greater trochanter.  There is bilateral gluteus tendon insertion greater trochanteric enthesophyte formation.  There is bilateral sartorius insertion enthesophyte formation at the anterior superior iliac spines.  There is sclerosis present along the lateral border of the inferior left sacroiliac joint and there is a suggestion of mild cortical irregularity at the inferior left sacroiliac  joint.  Sacroiliitis cannot be excluded.  There is degenerative facet arthropathy in the lower lumbar spine at L4-L5 and L5-S1.  Impression: 1. Possible osteonecrosis of the femoral heads.  Consider MRI of the bony pelvis/hips for further investigation.  2. Suggestion of possible sacroiliitis at the inferior left sacroiliac joint.  Consider additional investigation with MRI of the bony pelvis.  This report was flagged in Epic as abnormal.    Electronically signed by: Carlo Majano MD  Date:    02/18/2025  Time:    09:07  X-Ray Knee 1 or 2 View Right  Narrative: EXAMINATION:  XR KNEE 1 OR 2 VIEW RIGHT    CLINICAL HISTORY:  Pain in left hip    TECHNIQUE:  AP and lateral views of the right knee were performed.    COMPARISON:  None    FINDINGS:  There is tricompartmental osteophyte formation in both knees.  There is mild right knee medial tibiofemoral joint space narrowing.  No radiographically evident acute fracture or osseous destructive process appreciated.  There is loose body formation within the anterior aspect of the right knee in the vicinity of Hoffa's fat.  There is right patellar enthesophyte formation at the attachment of the quadriceps and patellar tendons.  There is a moderate volume of fluid present in the right suprapatellar recess.  No chondrocalcinosis.  Impression: As above    Electronically signed by: Carlo Majano MD  Date:    02/18/2025  Time:    09:03         **Labs and x-rays personally reviewed by me    ** reviewed           Assessment & Plan:   Assessment & Plan    IMPRESSION:  - Assessed fall-related injuries, focusing on right knee and left hip/pelvic area  - Determined need for x-rays to evaluate extent of injuries  - Considered pain management options, opting for non-narcotic medication to avoid constipation  - Evaluated need for follow-up to monitor recovery progress    FALL INJURY - LEFT HIP AND BUTTOCKS:  - Documented the patient's report of falling backwards on hardwood floor after  slipping on a rug or dustpan.  - Assessed the patient's condition following the fall, noting pain and soreness in the left hip and side of buttocks.  - Ordered x-rays of the affected areas to evaluate for potential injuries.  - Prescribed pain medication to manage discomfort.  - Scheduled a follow-up visit in 1 week to ensure proper recovery.  - Noted the patient's report of falling on left hip, causing pain and soreness.  - Observed no visible bruising on the hip despite the patient's tendency to bruise easily.  - Ordered x-ray of left hip and left pelvic area for further evaluation.  - Instructed the patient to contact the office within 24 hours if any abnormal findings are reported on x-rays.  - Prescribed pain medication to manage discomfort.  - Scheduled a follow-up visit in 1 week.    FALL INJURY - RIGHT KNEE:  - Documented the patient's report of pain and stiffness in right knee following the fall.  - Noted the patient's difficulty in putting weight on the right knee and possible bruising.  - Examined the knee and confirmed the location of pain.  - Ordered x-ray of right knee for further evaluation.  - Administered an injection for immediate pain relief during the visit.  - Prescribed pain medication to be taken 2 times daily as needed.  - Instructed the patient to contact the office within 24 hours if any abnormal findings are reported on x-rays.  - Scheduled a follow-up visit in 1 week.  - Noted the patient's report of stiffness in right knee after the fall and subsequent rest.  - Documented the patient's difficulty in moving and putting weight on the right knee.  - Ordered x-ray of right knee for further evaluation.  - Prescribed pain medication to manage discomfort and improve mobility.  - Instructed the patient to contact the office within 24 hours if any abnormal findings are reported on x-rays.  - Scheduled a follow-up visit in 1 week.    OTHER INSTRUCTIONS:  - Documented the patient's report of a  recent fall.  - Noted the patient's use of a walking stick for mobility following the fall.  - Started stool softener as backup, to be taken if needed.           1. Palpitations  -     EKG 12-lead; Future  -     Basic Metabolic Panel; Future; Expected date: 02/17/2025  -     Troponin I; Future; Expected date: 02/17/2025  -     Ambulatory referral/consult to Cardiology; Future; Expected date: 02/24/2025    2. Polyarthralgia  -     pregabalin (LYRICA) 150 MG capsule; Take 1 capsule (150 mg total) by mouth 2 (two) times daily.  Dispense: 180 capsule; Refill: 1    3. Cervical spondylosis without myelopathy  -     pregabalin (LYRICA) 150 MG capsule; Take 1 capsule (150 mg total) by mouth 2 (two) times daily.  Dispense: 180 capsule; Refill: 1    4. Pain of left hip  -     ketorolac injection 15 mg  -     X-Ray Knee 1 or 2 View Right; Future; Expected date: 02/17/2025  -     X-Ray Hip 2 or 3 views Left with Pelvis when performed; Future; Expected date: 02/17/2025    5. Type 2 diabetes mellitus without complication, unspecified whether long term insulin use  -     Hemoglobin A1C; Future; Expected date: 02/17/2025    Other orders  -     aMILoride (MIDAMOR) 5 MG Tab; Take 1 tablet (5 mg total) by mouth once daily.  Dispense: 30 tablet; Refill: 1  -     estradioL (ESTRACE) 0.01 % (0.1 mg/gram) vaginal cream; Place 1 g vaginally twice a week.  Dispense: 42.5 g; Refill: 1  -     glimepiride (AMARYL) 2 MG tablet; Take 1 tablet (2 mg total) by mouth every morning.  Dispense: 90 tablet; Refill: 1  -     oxybutynin (DITROPAN) 5 MG Tab; Take 1 tablet (5 mg total) by mouth every evening.  Dispense: 90 tablet; Refill: 1  -     rosuvastatin (CRESTOR) 10 MG tablet; Take 1 tablet (10 mg total) by mouth every evening.  Dispense: 90 tablet; Refill: 1  -     valsartan (DIOVAN) 160 MG tablet; Take 1 tablet (160 mg total) by mouth every evening.  Dispense: 90 tablet; Refill: 1  -     docusate sodium (COLACE) 100 MG capsule; Take 1 capsule (100  mg total) by mouth 2 (two) times daily.  Dispense: 30 capsule; Refill: 0  -     naproxen (NAPROSYN) 500 MG tablet; Take 1 tablet (500 mg total) by mouth 2 (two) times daily as needed (for pain).  Dispense: 30 tablet; Refill: 1            Obed Durant MD  This note was generated with the assistance of ambient listening technology. Verbal consent was obtained by the patient and accompanying visitor(s) for the recording of patient appointment to facilitate this note. I attest to having reviewed and edited the generated note for accuracy, though some syntax or spelling errors may persist. Please contact the author of this note for any clarification.            [1]   Current Outpatient Medications:     atenoloL (TENORMIN) 25 MG tablet, TAKE THREE TABLETS BY MOUTH ONCE DAILY, Disp: 90 tablet, Rfl: 3    levothyroxine (SYNTHROID) 100 MCG tablet, Take 1 tablet (100 mcg total) by mouth before breakfast., Disp: 30 tablet, Rfl: 1    LUMIGAN 0.01 % Drop, Place 1 drop into both eyes every evening., Disp: , Rfl:     pantoprazole (PROTONIX) 40 MG tablet, TAKE ONE TABLET BY MOUTH ONCE DAILY, Disp: 90 tablet, Rfl: 1    aMILoride (MIDAMOR) 5 MG Tab, Take 1 tablet (5 mg total) by mouth once daily., Disp: 30 tablet, Rfl: 1    cyclobenzaprine (FLEXERIL) 10 MG tablet, Take 10 mg by mouth 3 (three) times daily as needed for Muscle spasms. (Patient not taking: Reported on 2/17/2025), Disp: , Rfl:     docusate sodium (COLACE) 100 MG capsule, Take 1 capsule (100 mg total) by mouth 2 (two) times daily., Disp: 30 capsule, Rfl: 0    estradioL (ESTRACE) 0.01 % (0.1 mg/gram) vaginal cream, Place 1 g vaginally twice a week., Disp: 42.5 g, Rfl: 1    estrogens, conjugated, (PREMARIN) 1.25 MG tablet, Take 1 tablet (1.25 mg total) by mouth once daily. (Patient not taking: Reported on 2/17/2025), Disp: 90 tablet, Rfl: 1    fluconazole (DIFLUCAN) 200 MG Tab, TAKE ONE TABLET BY MOUTH EVERY DAY (Patient not taking: Reported on 2/17/2025), Disp: 7  tablet, Rfl: 1    fluticasone propionate (FLONASE) 50 mcg/actuation nasal spray, 1 spray (50 mcg total) by Each Nostril route once daily. (Patient not taking: Reported on 2/17/2025), Disp: 15.8 mL, Rfl: 0    glimepiride (AMARYL) 2 MG tablet, Take 1 tablet (2 mg total) by mouth every morning., Disp: 90 tablet, Rfl: 1    naproxen (NAPROSYN) 500 MG tablet, Take 1 tablet (500 mg total) by mouth 2 (two) times daily as needed (for pain)., Disp: 30 tablet, Rfl: 1    nirmatrelvir-ritonavir (PAXLOVID) 300 mg (150 mg x 2)-100 mg copackaged tablets (EUA), Take 3 tablets by mouth 2 (two) times daily. Each dose contains 2 nirmatrelvir (pink tablets) and 1 ritonavir (white tablet). Take all 3 tablets together (Patient not taking: Reported on 2/17/2025), Disp: 30 tablet, Rfl: 0    oxybutynin (DITROPAN) 5 MG Tab, Take 1 tablet (5 mg total) by mouth every evening., Disp: 90 tablet, Rfl: 1    pregabalin (LYRICA) 150 MG capsule, Take 1 capsule (150 mg total) by mouth 2 (two) times daily., Disp: 180 capsule, Rfl: 1    rosuvastatin (CRESTOR) 10 MG tablet, Take 1 tablet (10 mg total) by mouth every evening., Disp: 90 tablet, Rfl: 1    valsartan (DIOVAN) 160 MG tablet, Take 1 tablet (160 mg total) by mouth every evening., Disp: 90 tablet, Rfl: 1

## 2025-02-19 NOTE — TELEPHONE ENCOUNTER
----- Message from bOed Durant MD sent at 2/18/2025  9:01 PM CST -----  Need to see mon please  abnl results   ----- Message -----  From: Interface, Rad Results In  Sent: 2/18/2025   9:06 AM CST  To: Obed Durant MD      0821 Pt is scheduled for Monday

## 2025-02-24 ENCOUNTER — OFFICE VISIT (OUTPATIENT)
Dept: FAMILY MEDICINE | Facility: CLINIC | Age: 70
End: 2025-02-24
Payer: MEDICARE

## 2025-02-24 ENCOUNTER — ANESTHESIA EVENT (OUTPATIENT)
Dept: GASTROENTEROLOGY | Facility: HOSPITAL | Age: 70
End: 2025-02-24
Payer: MEDICARE

## 2025-02-24 ENCOUNTER — ANESTHESIA (OUTPATIENT)
Dept: GASTROENTEROLOGY | Facility: HOSPITAL | Age: 70
End: 2025-02-24
Payer: MEDICARE

## 2025-02-24 ENCOUNTER — HOSPITAL ENCOUNTER (OUTPATIENT)
Dept: GASTROENTEROLOGY | Facility: HOSPITAL | Age: 70
Discharge: HOME | End: 2025-02-24
Attending: INTERNAL MEDICINE | Admitting: INTERNAL MEDICINE
Payer: MEDICARE

## 2025-02-24 VITALS
TEMPERATURE: 98 F | HEART RATE: 50 BPM | HEIGHT: 67 IN | WEIGHT: 179 LBS | DIASTOLIC BLOOD PRESSURE: 71 MMHG | OXYGEN SATURATION: 99 % | RESPIRATION RATE: 16 BRPM | SYSTOLIC BLOOD PRESSURE: 131 MMHG | BODY MASS INDEX: 28.09 KG/M2

## 2025-02-24 VITALS
DIASTOLIC BLOOD PRESSURE: 71 MMHG | OXYGEN SATURATION: 98 % | RESPIRATION RATE: 18 BRPM | BODY MASS INDEX: 26.21 KG/M2 | TEMPERATURE: 98 F | HEIGHT: 67 IN | SYSTOLIC BLOOD PRESSURE: 131 MMHG | WEIGHT: 167 LBS | HEART RATE: 60 BPM

## 2025-02-24 DIAGNOSIS — K59.00 CONSTIPATION, UNSPECIFIED CONSTIPATION TYPE: ICD-10-CM

## 2025-02-24 DIAGNOSIS — M25.561 RIGHT KNEE PAIN, UNSPECIFIED CHRONICITY: ICD-10-CM

## 2025-02-24 DIAGNOSIS — R13.19 ESOPHAGEAL DYSPHAGIA: ICD-10-CM

## 2025-02-24 DIAGNOSIS — R00.2 PALPITATIONS: Primary | ICD-10-CM

## 2025-02-24 DIAGNOSIS — M25.551 BILATERAL HIP PAIN: ICD-10-CM

## 2025-02-24 DIAGNOSIS — M25.552 BILATERAL HIP PAIN: ICD-10-CM

## 2025-02-24 LAB — GLUCOSE SERPL-MCNC: 72 MG/DL (ref 70–105)

## 2025-02-24 PROCEDURE — 43239 EGD BIOPSY SINGLE/MULTIPLE: CPT | Mod: 59,,, | Performed by: INTERNAL MEDICINE

## 2025-02-24 PROCEDURE — 82962 GLUCOSE BLOOD TEST: CPT

## 2025-02-24 PROCEDURE — 37000009 HC ANESTHESIA EA ADD 15 MINS

## 2025-02-24 PROCEDURE — 4010F ACE/ARB THERAPY RXD/TAKEN: CPT | Mod: ,,, | Performed by: INTERNAL MEDICINE

## 2025-02-24 PROCEDURE — 99214 OFFICE O/P EST MOD 30 MIN: CPT | Mod: ,,, | Performed by: INTERNAL MEDICINE

## 2025-02-24 PROCEDURE — 88342 IMHCHEM/IMCYTCHM 1ST ANTB: CPT | Mod: 26,,, | Performed by: PATHOLOGY

## 2025-02-24 PROCEDURE — 88305 TISSUE EXAM BY PATHOLOGIST: CPT | Mod: 26,,, | Performed by: PATHOLOGY

## 2025-02-24 PROCEDURE — 37000008 HC ANESTHESIA 1ST 15 MINUTES

## 2025-02-24 PROCEDURE — 63600175 PHARM REV CODE 636 W HCPCS: Performed by: NURSE ANESTHETIST, CERTIFIED REGISTERED

## 2025-02-24 PROCEDURE — D9220A PRA ANESTHESIA: Mod: ,,, | Performed by: NURSE ANESTHETIST, CERTIFIED REGISTERED

## 2025-02-24 PROCEDURE — 88342 IMHCHEM/IMCYTCHM 1ST ANTB: CPT | Mod: TC,SUR | Performed by: INTERNAL MEDICINE

## 2025-02-24 PROCEDURE — 43248 EGD GUIDE WIRE INSERTION: CPT | Mod: ,,, | Performed by: INTERNAL MEDICINE

## 2025-02-24 PROCEDURE — 43248 EGD GUIDE WIRE INSERTION: CPT | Performed by: INTERNAL MEDICINE

## 2025-02-24 PROCEDURE — 43239 EGD BIOPSY SINGLE/MULTIPLE: CPT | Mod: 59 | Performed by: INTERNAL MEDICINE

## 2025-02-24 RX ORDER — SIMETHICONE 125 MG
125 CAPSULE ORAL DAILY
Qty: 3 CAPSULE | Refills: 0 | Status: SHIPPED | OUTPATIENT
Start: 2025-02-24

## 2025-02-24 RX ORDER — LIDOCAINE HYDROCHLORIDE 20 MG/ML
INJECTION, SOLUTION EPIDURAL; INFILTRATION; INTRACAUDAL; PERINEURAL
Status: DISCONTINUED | OUTPATIENT
Start: 2025-02-24 | End: 2025-02-24

## 2025-02-24 RX ORDER — PROPOFOL 10 MG/ML
VIAL (ML) INTRAVENOUS
Status: DISCONTINUED | OUTPATIENT
Start: 2025-02-24 | End: 2025-02-24

## 2025-02-24 RX ORDER — SYRING-NEEDL,DISP,INSUL,0.3 ML 29 G X1/2"
148 SYRINGE, EMPTY DISPOSABLE MISCELLANEOUS ONCE
Qty: 1 EACH | Refills: 1 | Status: SHIPPED | OUTPATIENT
Start: 2025-02-24 | End: 2025-02-24

## 2025-02-24 RX ORDER — SODIUM CHLORIDE 0.9 % (FLUSH) 0.9 %
10 SYRINGE (ML) INJECTION
Status: DISCONTINUED | OUTPATIENT
Start: 2025-02-24 | End: 2025-02-25 | Stop reason: HOSPADM

## 2025-02-24 RX ADMIN — LIDOCAINE HYDROCHLORIDE 50 MG: 20 INJECTION, SOLUTION EPIDURAL; INFILTRATION; INTRACAUDAL; PERINEURAL at 01:02

## 2025-02-24 RX ADMIN — PROPOFOL 50 MG: 10 INJECTION, EMULSION INTRAVENOUS at 01:02

## 2025-02-24 NOTE — ANESTHESIA POSTPROCEDURE EVALUATION
Anesthesia Post Evaluation    Patient: Amina Israel    Procedure(s) Performed: * No procedures listed *    Final Anesthesia Type: MAC      Patient location during evaluation: GI PACU  Patient participation: Yes- Able to Participate  Level of consciousness: awake and alert and oriented  Post-procedure vital signs: reviewed and stable  Pain management: adequate  Airway patency: patent    PONV status at discharge: No PONV  Anesthetic complications: no      Cardiovascular status: blood pressure returned to baseline and stable  Respiratory status: unassisted, spontaneous ventilation and room air  Hydration status: euvolemic  Follow-up not needed.  Comments: Refer to nursing note for pain/natalie score upon discharge from recovery.           Vitals Value Taken Time   /80 02/24/25 13:28   Temp 36.6 °C (97.9 °F) 02/24/25 13:28   Pulse 50 02/24/25 13:28   Resp 15 02/24/25 13:28   SpO2 100 % 02/24/25 13:28         No case tracking events are documented in the log.      Pain/Natalie Score: No data recorded

## 2025-02-24 NOTE — DISCHARGE INSTRUCTIONS
Procedure Date  2/24/25     Impression  Overall Impression:   2 cm type I hiatal hernia  Mild erythematous mucosa in the antrum and duodenal bulb, consistent with gastritis; performed cold forceps biopsies to rule out H. pylori  The upper third of the esophagus, middle third of the esophagus, lower third of the esophagus, Z-line, cardia, body of the stomach, incisura, 1st part of the duodenum and 2nd part of the duodenum appeared normal. Performed random biopsy to rule out eosinophilic esophagitis.  Dilated in the esophagus with Savsadie-Kamille dilator to 51 Fr ending size. Dilation caused improved passage of the scope        Recommendation  Await pathology results  Continue current medications   Follow up in clinic as scheduled or as needed      Outcome of procedure: successful EGD  Disposition: patient to recovery following procedure; discharge to home when appropriate parameters met  Provisions for follow up: please call my office for any unexpected symptoms like chest or abdominal pain or bleeding following your procedure.  NO DRIVING, OPERATING EQUIPMENT, OR SIGNING LEGAL DOCUMENTS FOR 24 HOURS.

## 2025-02-24 NOTE — H&P
Gastroenterology Pre-procedure H&P    History of Present Illness    Amina Israel is a 69 y.o. female that  has a past medical history of Diabetes mellitus, type 2, GERD (gastroesophageal reflux disease), History of thyroid cancer (2005), Hyperlipidemia, Hypertension, Hypothyroidism, Neuropathy, and Spinal stenosis.     Patient with GERD and dysphagia to solids here for EGD      Past Medical History:   Diagnosis Date    Diabetes mellitus, type 2     GERD (gastroesophageal reflux disease)     History of thyroid cancer 2005    Hyperlipidemia     Hypertension     Hypothyroidism     Neuropathy     Spinal stenosis        Past Surgical History:   Procedure Laterality Date    CERVICAL DISCECTOMY  2005    C2-3    FOOT SURGERY Bilateral 2001    HYSTERECTOMY  2003    OOPHORECTOMY      POLYPECTOMY      REDUCTION OF BOTH BREASTS Bilateral 2006    TONSILLECTOMY  1974    TOTAL REDUCTION MAMMOPLASTY         Family History   Problem Relation Name Age of Onset    Hypertension Mother      Diabetic kidney disease Mother      Tuberculosis Father      Asbestos Brother      Cancer Brother      No Known Problems Daughter         Social History     Socioeconomic History    Marital status:    Tobacco Use    Smoking status: Never    Smokeless tobacco: Never    Tobacco comments:     Tesha   Substance and Sexual Activity    Alcohol use: Never    Drug use: Never    Sexual activity: Not Currently       Current Medications[1]    Review of patient's allergies indicates:   Allergen Reactions    Metformin Diarrhea    Phenergan plain Nausea And Vomiting       Objective:  There were no vitals filed for this visit.     GEN: normal appearing, NAD, AAO x3  HENT: NCAT, anicteric, OP benign  CV: normal rate, regular rhythm  RESP: CTA, symmetric rise, unlabored  ABD: soft, ND, no guarding or TTP  SKIN: warm and dry  NEURO: grossly afocal    Assessment and Plan:    Proceed with:    EGD for dysphagia      Vahe Rey MD  Gastroenterology          [1]   Current Outpatient Medications   Medication Sig Dispense Refill    aMILoride (MIDAMOR) 5 MG Tab Take 1 tablet (5 mg total) by mouth once daily. 30 tablet 1    atenoloL (TENORMIN) 25 MG tablet TAKE THREE TABLETS BY MOUTH ONCE DAILY 90 tablet 3    cyclobenzaprine (FLEXERIL) 10 MG tablet Take 10 mg by mouth 3 (three) times daily as needed for Muscle spasms. (Patient not taking: Reported on 2/17/2025)      docusate sodium (COLACE) 100 MG capsule Take 1 capsule (100 mg total) by mouth 2 (two) times daily. 30 capsule 0    estradioL (ESTRACE) 0.01 % (0.1 mg/gram) vaginal cream Place 1 g vaginally twice a week. 42.5 g 1    estrogens, conjugated, (PREMARIN) 1.25 MG tablet Take 1 tablet (1.25 mg total) by mouth once daily. (Patient not taking: Reported on 2/17/2025) 90 tablet 1    fluconazole (DIFLUCAN) 200 MG Tab TAKE ONE TABLET BY MOUTH EVERY DAY (Patient not taking: Reported on 2/17/2025) 7 tablet 1    fluticasone propionate (FLONASE) 50 mcg/actuation nasal spray 1 spray (50 mcg total) by Each Nostril route once daily. (Patient not taking: Reported on 2/17/2025) 15.8 mL 0    glimepiride (AMARYL) 2 MG tablet Take 1 tablet (2 mg total) by mouth every morning. 90 tablet 1    levothyroxine (SYNTHROID) 100 MCG tablet Take 1 tablet (100 mcg total) by mouth before breakfast. 30 tablet 1    LUMIGAN 0.01 % Drop Place 1 drop into both eyes every evening.      naproxen (NAPROSYN) 500 MG tablet Take 1 tablet (500 mg total) by mouth 2 (two) times daily as needed (for pain). 30 tablet 1    nirmatrelvir-ritonavir (PAXLOVID) 300 mg (150 mg x 2)-100 mg copackaged tablets (EUA) Take 3 tablets by mouth 2 (two) times daily. Each dose contains 2 nirmatrelvir (pink tablets) and 1 ritonavir (white tablet). Take all 3 tablets together (Patient not taking: Reported on 2/17/2025) 30 tablet 0    oxybutynin (DITROPAN) 5 MG Tab Take 1 tablet (5 mg total) by mouth every evening. 90 tablet 1    pantoprazole (PROTONIX) 40 MG tablet TAKE ONE  TABLET BY MOUTH ONCE DAILY 90 tablet 1    pregabalin (LYRICA) 150 MG capsule Take 1 capsule (150 mg total) by mouth 2 (two) times daily. 180 capsule 1    rosuvastatin (CRESTOR) 10 MG tablet Take 1 tablet (10 mg total) by mouth every evening. 90 tablet 1    valsartan (DIOVAN) 160 MG tablet Take 1 tablet (160 mg total) by mouth every evening. 90 tablet 1     No current facility-administered medications for this encounter.

## 2025-02-24 NOTE — TRANSFER OF CARE
"Anesthesia Transfer of Care Note    Patient: Amina Israel    Procedure(s) Performed: * No procedures listed *    Patient location: GI    Anesthesia Type: MAC    Transport from OR: Transported from OR on room air with adequate spontaneous ventilation. Continuous ECG monitoring in transport. Continuous SpO2 monitoring in transport    Post pain: adequate analgesia    Post assessment: no apparent anesthetic complications    Post vital signs: stable    Level of consciousness: responds to stimulation, awake and sedated    Nausea/Vomiting: no nausea/vomiting    Complications: none    Transfer of care protocol was followed    Last vitals: Visit Vitals  BP (!) 167/80 (BP Location: Right arm, Patient Position: Lying)   Pulse (!) 50   Temp 36.6 °C (97.9 °F) (Oral)   Resp 15   Ht 5' 7" (1.702 m)   Wt 81.2 kg (179 lb)   SpO2 100%   Breastfeeding No   BMI 28.04 kg/m²     "

## 2025-02-25 ENCOUNTER — RESULTS FOLLOW-UP (OUTPATIENT)
Dept: GASTROENTEROLOGY | Facility: CLINIC | Age: 70
End: 2025-02-25

## 2025-02-25 ENCOUNTER — HOSPITAL ENCOUNTER (EMERGENCY)
Facility: HOSPITAL | Age: 70
Discharge: HOME | End: 2025-02-25
Payer: MEDICARE

## 2025-02-25 VITALS
HEIGHT: 67 IN | SYSTOLIC BLOOD PRESSURE: 179 MMHG | RESPIRATION RATE: 17 BRPM | BODY MASS INDEX: 26.21 KG/M2 | OXYGEN SATURATION: 97 % | DIASTOLIC BLOOD PRESSURE: 78 MMHG | WEIGHT: 167 LBS | HEART RATE: 65 BPM | TEMPERATURE: 98 F

## 2025-02-25 DIAGNOSIS — K59.00 CONSTIPATION, UNSPECIFIED CONSTIPATION TYPE: Primary | ICD-10-CM

## 2025-02-25 LAB
ALBUMIN SERPL BCP-MCNC: 4.3 G/DL (ref 3.4–4.8)
ALBUMIN/GLOB SERPL: 1.2 {RATIO}
ALP SERPL-CCNC: 63 U/L (ref 40–150)
ALT SERPL W P-5'-P-CCNC: 13 U/L
ANION GAP SERPL CALCULATED.3IONS-SCNC: 13 MMOL/L (ref 7–16)
AST SERPL W P-5'-P-CCNC: 30 U/L (ref 5–34)
BASOPHILS # BLD AUTO: 0.05 K/UL (ref 0–0.2)
BASOPHILS NFR BLD AUTO: 0.4 % (ref 0–1)
BILIRUB SERPL-MCNC: 0.6 MG/DL
BUN SERPL-MCNC: 10 MG/DL (ref 10–20)
BUN/CREAT SERPL: 12 (ref 6–20)
CALCIUM SERPL-MCNC: 9.7 MG/DL (ref 8.4–10.2)
CHLORIDE SERPL-SCNC: 105 MMOL/L (ref 98–107)
CO2 SERPL-SCNC: 24 MMOL/L (ref 23–31)
CREAT SERPL-MCNC: 0.84 MG/DL (ref 0.55–1.02)
DIFFERENTIAL METHOD BLD: ABNORMAL
EGFR (NO RACE VARIABLE) (RUSH/TITUS): 76 ML/MIN/1.73M2
EOSINOPHIL # BLD AUTO: 0.05 K/UL (ref 0–0.5)
EOSINOPHIL NFR BLD AUTO: 0.4 % (ref 1–4)
ERYTHROCYTE [DISTWIDTH] IN BLOOD BY AUTOMATED COUNT: 12.9 % (ref 11.5–14.5)
ESTROGEN SERPL-MCNC: NORMAL PG/ML
GLOBULIN SER-MCNC: 3.5 G/DL (ref 2–4)
GLUCOSE SERPL-MCNC: 119 MG/DL (ref 82–115)
HCT VFR BLD AUTO: 44.5 % (ref 38–47)
HGB BLD-MCNC: 14.8 G/DL (ref 12–16)
IMM GRANULOCYTES # BLD AUTO: 0.05 K/UL (ref 0–0.04)
IMM GRANULOCYTES NFR BLD: 0.4 % (ref 0–0.4)
INSULIN SERPL-ACNC: NORMAL U[IU]/ML
LAB AP GROSS DESCRIPTION: NORMAL
LAB AP LABORATORY NOTES: NORMAL
LIPASE SERPL-CCNC: 37 U/L
LYMPHOCYTES # BLD AUTO: 3.36 K/UL (ref 1–4.8)
LYMPHOCYTES NFR BLD AUTO: 27.1 % (ref 27–41)
MAGNESIUM SERPL-MCNC: 2.1 MG/DL (ref 1.6–2.6)
MCH RBC QN AUTO: 29.7 PG (ref 27–31)
MCHC RBC AUTO-ENTMCNC: 33.3 G/DL (ref 32–36)
MCV RBC AUTO: 89.2 FL (ref 80–96)
MONOCYTES # BLD AUTO: 0.93 K/UL (ref 0–0.8)
MONOCYTES NFR BLD AUTO: 7.5 % (ref 2–6)
MPC BLD CALC-MCNC: 10.6 FL (ref 9.4–12.4)
NEUTROPHILS # BLD AUTO: 7.96 K/UL (ref 1.8–7.7)
NEUTROPHILS NFR BLD AUTO: 64.2 % (ref 53–65)
NRBC # BLD AUTO: 0 X10E3/UL
NRBC, AUTO (.00): 0 %
PLATELET # BLD AUTO: 235 K/UL (ref 150–400)
POTASSIUM SERPL-SCNC: 3.8 MMOL/L (ref 3.5–5.1)
PROT SERPL-MCNC: 7.8 G/DL (ref 5.8–7.6)
RBC # BLD AUTO: 4.99 M/UL (ref 4.2–5.4)
SODIUM SERPL-SCNC: 138 MMOL/L (ref 136–145)
T3RU NFR SERPL: NORMAL %
WBC # BLD AUTO: 12.4 K/UL (ref 4.5–11)

## 2025-02-25 PROCEDURE — 25500020 PHARM REV CODE 255: Performed by: NURSE PRACTITIONER

## 2025-02-25 PROCEDURE — 96374 THER/PROPH/DIAG INJ IV PUSH: CPT

## 2025-02-25 PROCEDURE — 36415 COLL VENOUS BLD VENIPUNCTURE: CPT | Performed by: NURSE PRACTITIONER

## 2025-02-25 PROCEDURE — 80053 COMPREHEN METABOLIC PANEL: CPT | Performed by: NURSE PRACTITIONER

## 2025-02-25 PROCEDURE — 25000003 PHARM REV CODE 250: Performed by: NURSE PRACTITIONER

## 2025-02-25 PROCEDURE — 99285 EMERGENCY DEPT VISIT HI MDM: CPT | Mod: 25

## 2025-02-25 PROCEDURE — 85025 COMPLETE CBC W/AUTO DIFF WBC: CPT | Performed by: NURSE PRACTITIONER

## 2025-02-25 PROCEDURE — 63600175 PHARM REV CODE 636 W HCPCS: Performed by: NURSE PRACTITIONER

## 2025-02-25 PROCEDURE — 83690 ASSAY OF LIPASE: CPT | Performed by: NURSE PRACTITIONER

## 2025-02-25 PROCEDURE — 83735 ASSAY OF MAGNESIUM: CPT | Performed by: NURSE PRACTITIONER

## 2025-02-25 RX ORDER — ONDANSETRON HYDROCHLORIDE 2 MG/ML
4 INJECTION, SOLUTION INTRAVENOUS
Status: COMPLETED | OUTPATIENT
Start: 2025-02-25 | End: 2025-02-25

## 2025-02-25 RX ORDER — IOPAMIDOL 755 MG/ML
100 INJECTION, SOLUTION INTRAVASCULAR
Status: COMPLETED | OUTPATIENT
Start: 2025-02-25 | End: 2025-02-25

## 2025-02-25 RX ADMIN — ONDANSETRON 4 MG: 2 INJECTION INTRAMUSCULAR; INTRAVENOUS at 03:02

## 2025-02-25 RX ADMIN — LACTULOSE 20 G: 20 SOLUTION ORAL at 07:02

## 2025-02-25 RX ADMIN — IOPAMIDOL 100 ML: 755 INJECTION, SOLUTION INTRAVENOUS at 05:02

## 2025-02-25 NOTE — PROGRESS NOTES
"Subjective:       Patient ID: Amina Israel is a 69 y.o. female.    Chief Complaint: Back Pain    History of Present Illness    CHIEF COMPLAINT:  Patient presents today for gas pain and constipation.    GASTROINTESTINAL:  She reports no bowel movements since the 17th. Over-the-counter laxative provided temporary relief. She experiences gas pain in her abdomen and back with sensation of gas bubbles moving in her abdomen.    CARDIAC:  She developed cardiac symptoms after taking Paxlovid for COVID infection, including a decrease in heart rate from 70 to 50 BPM and new onset palpitations.    FAMILY HISTORY:  Mother has similar sensitivity to anesthesia and pain medications causing constipation.         Current Medications:  Current Medications[1]           ROS  Twelve point system reviewed, unremarkable except for stated above in HPI.        Objective:         Vitals:    02/24/25 1433 02/24/25 1434   BP: (!) 182/118 131/71   BP Location: Left arm    Patient Position: Sitting    Pulse: 60    Resp: 18    Temp: 97.6 °F (36.4 °C)    TempSrc: Temporal    SpO2: 98%    Weight: 75.8 kg (167 lb)    Height: 5' 7" (1.702 m)         Physical Exam     Patient is awake alert oriented person place and  Lungs are clear to auscultation bilaterally no crackles or wheezes   Cardiovascular S1-S2 regular rate and rhythm no murmurs rubs or gallops   Abdomen distended, positive bowel sounds, periumbilical tenderness    , extremities no clubbing cyanosis edema  Neuro no focal neurological deficits  Skin warm and dry.     Last Labs:     Hospital Outpatient Visit on 02/24/2025   Component Date Value    POC Glucose 02/24/2025 72    Office Visit on 02/17/2025   Component Date Value    Sodium 02/17/2025 139     Potassium 02/17/2025 4.5     Chloride 02/17/2025 105     CO2 02/17/2025 24     Anion Gap 02/17/2025 15     Glucose 02/17/2025 80 (L)     BUN 02/17/2025 9 (L)     Creatinine 02/17/2025 0.75     BUN/Creatinine Ratio 02/17/2025 12     Calcium " 02/17/2025 9.4     eGFR 02/17/2025 87     Troponin I High Sensitiv* 02/17/2025 <2.7     Hemoglobin A1C 02/17/2025 5.5     Estimated Average Glucose 02/17/2025 111    Office Visit on 02/07/2025   Component Date Value    Final Diagnosis 02/07/2025                      Value:A. 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 Necrobiosis lipoidica and other entities are less likely. Clinical correlation is suggested.      Specimen A Procedure 02/07/2025 Punch Biopsy     Specimen A Morphology 02/07/2025 skin colored subcutaneous nodules     Specimen A DDx 02/07/2025 subcutaneous granuloma annulare vs erythema elavatum diutinium vs sarcoidosis     Microscopic Description 02/07/2025                      Value:A microscopic examination was performed and the diagnosis reflects the findings.          Gross Description 02/07/2025                      Value:A. Elbow, Left:   The specimen is received in formalin designated Elbow, Left and consists of a single circular piece of tan-brown skin consistent with a punch biopsy that measures 0.4 cm in greatest diameter excised to a depth of 0.8 cm.  The skin surface is unremarkable.  The margins are marked with black ink and the specimen is bisected and submitted entirely in cassette A1.    Grossing was completed by Ye Roman.      Laboratory Notes 02/07/2025                      Value:If this report includes immunohistochemical (IHC) test results, please note the following: IHC studies were interpreted in conjunction with appropriate positive and negative controls which demonstrate the expected positive and negative reactivity. This laboratory is regulated under CLIA as qualified to perform high-complexity testing. IHC tests are used for clinical purposes. They should not be regarded  as investigational or research.        Case Report 02/07/2025                      Value:Surgical Pathology                                Case: [id number]                                   Authorizing Provider:  Allie Gustafson MD       Collected:           02/07/2025 11:13 AM          Ordering Location:     [facility]         Received:            02/07/2025 03:13 PM                                 Dermatology                                                                  Pathologist:           Gerardo Zuniga MD                                                      Specimen:    Elbow, Left                                                                                   Last Imaging:  XR ABDOMEN, ACUTE 2 OR MORE VIEWS WITH CHEST  Narrative: EXAMINATION:  XR ABDOMEN ACUTE 2 OR MORE VIEWS WITH CHEST    CLINICAL HISTORY:  abdominal pain;    TECHNIQUE:  AP chest and upright and supine abdominal radiographs    COMPARISON:  [id number]/05/2024.    FINDINGS:  Mediastinal structures are midline.  Hilar contours are unremarkable.  Cardiac silhouette is normal in size.  Lung volumes are normal and symmetric.  No consolidation.  No pneumothorax or pleural effusion.    No intra-abdominal free air.  There is scattered gas within nondilated loops of bowel.  There is a mild amount of retained fecal material throughout the visualized colon.  No abnormal calcifications.  No radiographic findings to suggest organomegaly or mass effect.  No acute osseous abnormalities.  Degenerative changes of the spine and bilateral hips.  Postsurgical changes of the cervical spine.  Impression: 1. No acute radiographic findings in the chest or abdomen.    Electronically signed by: Kendall Driscoll MD  Date:    02/25/2025  Time:    11:44         **Labs and x-rays personally reviewed by me    ** reviewed           Assessment & Plan:   Assessment & Plan    IMPRESSION:  - Evaluated complaint of gas pains and constipation  -  Considered possibility of locked bowel, necessitating caution with oral laxatives  - Noted abnormal EKG showing atrial pacing, despite patient not having a pacemaker  - Identified osteonecrosis of the hips and mild arthritis in right knee from previous imaging  - Planned conservative management for constipation with progression to stronger interventions if needed  - Will investigate cardiac concerns with echocardiogram prior to cardiology consult    CONSTIPATION:  - Emphasized the importance of a gradual approach to treating constipation, starting with rectal interventions before oral medications.  - Prescribed Fleet's enema to be administered rectally and held for 5-10 minutes before releasing.  - Instructed the patient to use the enema as directed and text the office to report whether a bowel movement occurs after use.  - Advised the patient to hold off on taking magnesium citrate until instructed.  - Provided a prescription for magnesium citrate with instructions not to take until approved.  - Scheduled a follow-up on Wednesday to ensure bowel movement has occurred.  - Requested the patient to contact the office via text tonight to report on bowel movement status after enema use.    ABDOMINAL DISTENSION:  - Observed abdominal distension and auscultated bowel sounds during exam.  - Planned to order an abdominal x-ray to check for gas and rule out locked bowel.  - Ordered an x-ray of the stomach to assess gas accumulation.  - Noted that the patient reports gas pains, feeling gas bubbles, and hearing gas movement in the stomach.  - Addressed treatment for abdominal distension through the management of constipation with Fleet's enema and magnesium citrate.    ABNORMAL EKG FINDINGS:  - Discussed the significance of abnormal EKG findings and the need for further cardiac evaluation.  - Ordered an echocardiogram to evaluate heart function.  - Referred the patient to cardiology: Appointment scheduled with Dr. More on  March 12th.  - Scheduled a follow-up this week for EKG.  - Noted that heart rate dropped from 70 to 50 BPM after taking Paxlovid for COVID.  - Observed that EKG shows Pace Rhythm, but the patient does not have a pacemaker.  - Confirmed that the patient is experiencing palpitations, reporting feeling something unusual in their heart that they have not felt before.    HIP OSTEONECROSIS:  - Ordered an MRI of hips to further assess osteonecrosis.  - Referred the patient to orthopedics for evaluation of hip osteonecrosis.  - Noted that x-ray results show osteonecrosis of the hip.    RIGHT KNEE ARTHRITIS:  - Referred the patient to orthopedics for evaluation of right knee arthritis.  - Noted that x-ray results show mild arthritis in the right knee and a loose body in the right knee cap.    PAIN MANAGEMENT REFERRAL:  - Referred the patient to Dr. Velasco for pain management at [facility].    POST-COVID CARDIAC ISSUES:  - Noted that heart rate changes after taking Paxlovid for COVID.  - Referred the patient to a cardiologist to evaluate potential post-COVID cardiac issues.           1. Palpitations  -     Echo; Future; Expected date: 02/25/2025  -     Ambulatory referral/consult to Cardiology; Future; Expected date: 03/03/2025    2. Constipation, unspecified constipation type  -     X-Ray KUB; Future; Expected date: 02/24/2025  -     simethicone (MYLICON) 125 mg Cap capsule; Take 1 capsule (125 mg total) by mouth once daily.  Dispense: 3 capsule; Refill: 0    3. Right knee pain, unspecified chronicity  -     Cancel: Ambulatory referral/consult to Orthopedics; Future; Expected date: 03/03/2025  -     Ambulatory referral/consult to Pain Clinic; Future; Expected date: 03/03/2025    4. Bilateral hip pain  -     Ambulatory referral/consult to Orthopedics; Future; Expected date: 03/03/2025  -     MRI Hip Without Contrast Left; Future; Expected date: 02/24/2025  -     MRI Hip Without Contrast Right; Future; Expected date:  02/24/2025    Other orders  -     magnesium citrate solution; Take 148 mLs by mouth once. for 1 dose  Dispense: 1 each; Refill: 1              Obed Durant MD  This note was generated with the assistance of ambient listening technology. Verbal consent was obtained by the patient and accompanying visitor(s) for the recording of patient appointment to facilitate this note. I attest to having reviewed and edited the generated note for accuracy, though some syntax or spelling errors may persist. Please contact the author of this note for any clarification.            [1] No current facility-administered medications for this visit.    Current Outpatient Medications:     aMILoride (MIDAMOR) 5 MG Tab, Take 1 tablet (5 mg total) by mouth once daily., Disp: 30 tablet, Rfl: 1    atenoloL (TENORMIN) 25 MG tablet, TAKE THREE TABLETS BY MOUTH ONCE DAILY, Disp: 90 tablet, Rfl: 3    docusate sodium (COLACE) 100 MG capsule, Take 1 capsule (100 mg total) by mouth 2 (two) times daily., Disp: 30 capsule, Rfl: 0    estradioL (ESTRACE) 0.01 % (0.1 mg/gram) vaginal cream, Place 1 g vaginally twice a week., Disp: 42.5 g, Rfl: 1    glimepiride (AMARYL) 2 MG tablet, Take 1 tablet (2 mg total) by mouth every morning., Disp: 90 tablet, Rfl: 1    levothyroxine (SYNTHROID) 100 MCG tablet, Take 1 tablet (100 mcg total) by mouth before breakfast., Disp: 30 tablet, Rfl: 1    LUMIGAN 0.01 % Drop, Place 1 drop into both eyes every evening., Disp: , Rfl:     naproxen (NAPROSYN) 500 MG tablet, Take 1 tablet (500 mg total) by mouth 2 (two) times daily as needed (for pain)., Disp: 30 tablet, Rfl: 1    oxybutynin (DITROPAN) 5 MG Tab, Take 1 tablet (5 mg total) by mouth every evening., Disp: 90 tablet, Rfl: 1    pantoprazole (PROTONIX) 40 MG tablet, TAKE ONE TABLET BY MOUTH ONCE DAILY, Disp: 90 tablet, Rfl: 1    pregabalin (LYRICA) 150 MG capsule, Take 1 capsule (150 mg total) by mouth 2 (two) times daily., Disp: 180 capsule, Rfl: 1    rosuvastatin  (CRESTOR) 10 MG tablet, Take 1 tablet (10 mg total) by mouth every evening., Disp: 90 tablet, Rfl: 1    valsartan (DIOVAN) 160 MG tablet, Take 1 tablet (160 mg total) by mouth every evening., Disp: 90 tablet, Rfl: 1    cyclobenzaprine (FLEXERIL) 10 MG tablet, Take 10 mg by mouth 3 (three) times daily as needed for Muscle spasms. (Patient not taking: Reported on 2/17/2025), Disp: , Rfl:     estrogens, conjugated, (PREMARIN) 1.25 MG tablet, Take 1 tablet (1.25 mg total) by mouth once daily. (Patient not taking: Reported on 2/17/2025), Disp: 90 tablet, Rfl: 1    fluconazole (DIFLUCAN) 200 MG Tab, TAKE ONE TABLET BY MOUTH EVERY DAY (Patient not taking: Reported on 2/17/2025), Disp: 7 tablet, Rfl: 1    fluticasone propionate (FLONASE) 50 mcg/actuation nasal spray, 1 spray (50 mcg total) by Each Nostril route once daily. (Patient not taking: Reported on 2/17/2025), Disp: 15.8 mL, Rfl: 0    nirmatrelvir-ritonavir (PAXLOVID) 300 mg (150 mg x 2)-100 mg copackaged tablets (EUA), Take 3 tablets by mouth 2 (two) times daily. Each dose contains 2 nirmatrelvir (pink tablets) and 1 ritonavir (white tablet). Take all 3 tablets together (Patient not taking: Reported on 2/17/2025), Disp: 30 tablet, Rfl: 0    simethicone (MYLICON) 125 mg Cap capsule, Take 1 capsule (125 mg total) by mouth once daily., Disp: 3 capsule, Rfl: 0

## 2025-02-25 NOTE — ED PROVIDER NOTES
Encounter Date: 2/25/2025       History     Chief Complaint   Patient presents with    Constipation     Pt presents to ED via POV with c/o constipation since last Wednesday. Pt reports seeing Dr. Durant yesterday and doing an enema last night. Pt reports small BM last night but still having pain. Pt also had EGD done yesterday.      CC 9-year-old female who presents to the emergency department to be evaluated for constipation and right side abdominal pain.  She reports No BM since 2/17/2025.  She had an EGD yesterday. Miralax and Gatorade were encouraged by her primary care provider to assist with constipation.  She performed an enema last night and noted only a small amount liquid from water of enema.  No fecal content.  She denies noticing blood in her stool.  Although she does report some nausea, she denies any vomiting.    The history is provided by the patient.   Constipation   The current episode started several days ago. The problem has been gradually worsening. There was no prior successful therapy. Prior unsuccessful therapies include enemas and stool softeners. Associated symptoms include anorexia, abdominal pain and nausea. Pertinent negatives include no fever, no diarrhea, no hematemesis, no hemorrhoids, no rectal pain, no vomiting, no hematuria, no chest pain, no headaches, no coughing, no difficulty breathing and no rash. Nausea began today. Urine output has been normal.     Review of patient's allergies indicates:   Allergen Reactions    Metformin Diarrhea    Phenergan plain Nausea And Vomiting     Past Medical History:   Diagnosis Date    Diabetes mellitus, type 2     GERD (gastroesophageal reflux disease)     History of thyroid cancer 2005    Hyperlipidemia     Hypertension     Hypothyroidism     Neuropathy     Spinal stenosis      Past Surgical History:   Procedure Laterality Date    CERVICAL DISCECTOMY  2005    C2-3    FOOT SURGERY Bilateral 2001    HYSTERECTOMY  2003    OOPHORECTOMY       POLYPECTOMY      REDUCTION OF BOTH BREASTS Bilateral 2006    TONSILLECTOMY  1974    TOTAL REDUCTION MAMMOPLASTY       Family History   Problem Relation Name Age of Onset    Hypertension Mother      Diabetic kidney disease Mother      Tuberculosis Father      Asbestos Brother      Cancer Brother      No Known Problems Daughter       Social History[1]  Review of Systems   Constitutional:  Positive for activity change and appetite change. Negative for chills, diaphoresis, fatigue, fever and unexpected weight change.   HENT: Negative.     Respiratory:  Negative for cough, chest tightness, shortness of breath, wheezing and stridor.    Cardiovascular:  Negative for chest pain, palpitations and leg swelling.   Gastrointestinal:  Positive for abdominal pain, anorexia, constipation and nausea. Negative for anal bleeding, blood in stool, diarrhea, hematemesis, hemorrhoids, rectal pain and vomiting.   Genitourinary:  Negative for decreased urine volume, difficulty urinating, dysuria, hematuria and urgency.   Musculoskeletal: Negative.    Skin:  Negative for color change, pallor and rash.   Neurological:  Negative for dizziness, syncope, weakness, light-headedness, numbness and headaches.   Hematological: Negative.    Psychiatric/Behavioral: Negative.         Physical Exam     Initial Vitals [02/25/25 1021]   BP Pulse Resp Temp SpO2   (!) 187/89 (!) 51 17 98 °F (36.7 °C) 100 %      MAP       --         Physical Exam    Vitals reviewed.  Constitutional: She appears well-developed and well-nourished. No distress.   HENT:   Head: Normocephalic and atraumatic.   Right Ear: External ear normal.   Left Ear: External ear normal.   Nose: Nose normal. Mouth/Throat: Oropharynx is clear and moist. No oropharyngeal exudate.   Eyes: EOM are normal. Pupils are equal, round, and reactive to light. No scleral icterus.   Neck: Neck supple.   Normal range of motion.  Cardiovascular:  Normal rate, regular rhythm and intact distal pulses.            Pulmonary/Chest: Breath sounds normal. No stridor. No respiratory distress. She has no wheezes. She has no rhonchi. She has no rales. She exhibits no tenderness.   Abdominal: There is abdominal tenderness. There is guarding.   Musculoskeletal:         General: No tenderness or edema. Normal range of motion.      Cervical back: Normal range of motion and neck supple.     Lymphadenopathy:     She has no cervical adenopathy.   Neurological: She is alert and oriented to person, place, and time. She has normal strength. GCS score is 15. GCS eye subscore is 4. GCS verbal subscore is 5. GCS motor subscore is 6.   Skin: Skin is warm and dry. Capillary refill takes less than 2 seconds.   Psychiatric: She has a normal mood and affect. Thought content normal.         Medical Screening Exam   See Full Note    ED Course   Procedures  Labs Reviewed   COMPREHENSIVE METABOLIC PANEL - Abnormal       Result Value    Sodium 138      Potassium 3.8      Chloride 105      CO2 24      Anion Gap 13      Glucose 119 (*)     BUN 10      Creatinine 0.84      BUN/Creatinine Ratio 12      Calcium 9.7      Total Protein 7.8 (*)     Albumin 4.3      Globulin 3.5      A/G Ratio 1.2      Bilirubin, Total 0.6      Alk Phos 63      ALT 13      AST 30      eGFR 76     CBC WITH DIFFERENTIAL - Abnormal    WBC 12.40 (*)     RBC 4.99      Hemoglobin 14.8      Hematocrit 44.5      MCV 89.2      MCH 29.7      MCHC 33.3      RDW 12.9      Platelet Count 235      MPV 10.6      Neutrophils % 64.2      Lymphocytes % 27.1      Monocytes % 7.5 (*)     Eosinophils % 0.4 (*)     Basophils % 0.4      Immature Granulocytes % 0.4      nRBC, Auto 0.0      Neutrophils, Abs 7.96 (*)     Lymphocytes, Absolute 3.36      Monocytes, Absolute 0.93 (*)     Eosinophils, Absolute 0.05      Basophils, Absolute 0.05      Immature Granulocytes, Absolute 0.05 (*)     nRBC, Absolute 0.00      Diff Type Auto     LIPASE - Normal    Lipase 37     MAGNESIUM - Normal    Magnesium 2.1      CBC W/ AUTO DIFFERENTIAL    Narrative:     The following orders were created for panel order CBC auto differential.  Procedure                               Abnormality         Status                     ---------                               -----------         ------                     CBC with Differential[[id number]]       Abnormal            Final result                 Please view results for these tests on the individual orders.          Imaging Results              CT Abdomen Pelvis With IV Contrast NO Oral Contrast (Final result)  Result time 02/25/25 18:45:57      Final result by Sterling Olivas MD (02/25/25 18:45:57)                   Impression:      1. Findings suggest hepatic steatosis, correlation with LFTs recommended.  2. No findings to suggest obstructive uropathy.  3. Please see above for several additional findings.      Electronically signed by: Sterling Olivas MD  Date:    02/25/2025  Time:    18:45               Narrative:    EXAMINATION:  CT ABDOMEN PELVIS WITH IV CONTRAST    CLINICAL HISTORY:  Abdominal pain, acute, nonlocalized;    TECHNIQUE:  Low dose axial images, sagittal and coronal reformations were obtained from the lung bases to the pubic symphysis following the IV administration of 100 mL of Omnipaque 350 .  Oral contrast was not given.    COMPARISON:  Ultrasound 02/25/2025    FINDINGS:  Images of the lower thorax are remarkable for bilateral dependent atelectasis, minimal.    The liver is mildly hypoattenuating, possibly reflecting steatosis, correlation with LFTs recommended.  The spleen, pancreas, gallbladder and right adrenal gland are unremarkable.  There is a nodule within the left adrenal gland measuring 1.4 cm, nonspecific although may contain internal fat.  The stomach is nondistended, no gastric wall thickening.  There is a small hiatal hernia.  The portal vein, splenic vein, SMV, celiac axis and SMA all are patent.  No significant abdominal lymphadenopathy.    The  kidneys enhance symmetrically without hydronephrosis or nephrolithiasis.  Several low attenuating lesions arise from the left kidney, the larger of which measure attenuation suggesting cysts, the smaller are too small for characterization.  Largest measures 3.3 cm.  The bilateral ureters are unremarkable without calculi seen.  The urinary bladder is unremarkable.  The uterus is absent the adnexa is unremarkable.    There are a few scattered colonic diverticula without inflammation to suggest diverticulitis.  The terminal ileum is unremarkable.  The appendix is unremarkable.  The small bowel is grossly unremarkable.  There are a few scattered shotty periaortic, pericaval, and mesenteric lymph nodes.  There is atherosclerotic calcification of the aorta and its branches.  No focal organized pelvic fluid collection.    There are degenerative changes of the bilateral sacroiliac joints and spine.  No significant inguinal lymphadenopathy.                                       US Abdomen Limited_Gallbladder (Final result)  Result time 02/25/25 14:15:58      Final result by Sterling Olivas MD (02/25/25 14:15:58)                   Impression:      No findings to suggest acute cholecystitis.    Possible hepatic steatosis, correlation with LFTs recommended.      Electronically signed by: Sterling Olivas MD  Date:    02/25/2025  Time:    14:15               Narrative:    EXAMINATION:  US ABDOMEN LIMITED_GALLBLADDER    CLINICAL HISTORY:  RUQ abd pain;    TECHNIQUE:  Limited ultrasound of the right upper quadrant of the abdomen focused on the biliary system was performed.    COMPARISON:  None    FINDINGS:  The visualized portions of the pancreas are grossly unremarkable noting the pancreas is for the most part obscured by overlying soft tissue structures.  The gallbladder is nondistended.  No gallbladder wall thickening or pericholecystic fluid.  No gallbladder wall hyperemia.  Sonographic Lopez sign is negative.  The common  duct is not dilated measuring 3 mm.  The hepatic parenchyma is homogeneous noting somewhat echogenic echotexture.  The liver is not enlarged.  The visualized portions of the spleen are unremarkable, for the most part obscured.  No ascites.                                       XR ABDOMEN, ACUTE 2 OR MORE VIEWS WITH CHEST (Final result)  Result time 02/25/25 11:44:26      Final result by Kendall Driscoll MD (02/25/25 11:44:26)                   Impression:      1. No acute radiographic findings in the chest or abdomen.      Electronically signed by: Kendall Driscoll MD  Date:    02/25/2025  Time:    11:44               Narrative:    EXAMINATION:  XR ABDOMEN ACUTE 2 OR MORE VIEWS WITH CHEST    CLINICAL HISTORY:  abdominal pain;    TECHNIQUE:  AP chest and upright and supine abdominal radiographs    COMPARISON:  [id number].    FINDINGS:  Mediastinal structures are midline.  Hilar contours are unremarkable.  Cardiac silhouette is normal in size.  Lung volumes are normal and symmetric.  No consolidation.  No pneumothorax or pleural effusion.    No intra-abdominal free air.  There is scattered gas within nondilated loops of bowel.  There is a mild amount of retained fecal material throughout the visualized colon.  No abnormal calcifications.  No radiographic findings to suggest organomegaly or mass effect.  No acute osseous abnormalities.  Degenerative changes of the spine and bilateral hips.  Postsurgical changes of the cervical spine.                                       Medications   ondansetron injection 4 mg (4 mg Intravenous Given 2/25/25 1536)   iopamidoL (ISOVUE-370) injection 100 mL (100 mLs Intravenous Given 2/25/25 1705)   lactulose 20 gram/30 mL solution Soln 20 g (20 g Oral Given 2/25/25 1933)     Medical Decision Making  CC 9-year-old female who presents to the emergency department to be evaluated for right abdominal pain.  She reports No BM since 2/17/2025.  She had an EGD yesterday. Miralax  and Gatorade were encouraged by her primary care provider to assist with constipation.  She performed an enema last night and noted only a small amount liquid from water of enema.  No fecal content.  She denies noticing blood in her stool.  Although she does report some nausea, she denies any vomiting.    Labs ordered and reviewed  X-rays ordered and reviewed with radiologist interpretation significant for No acute radiographic findings in the chest or abdomen.  Gallbladder Ultrasound ordered and reviewed with radiologist interpretation significant for no acute process.  CT abdomen ordered    Patient vomiting.  Ordered Zofran IV.    1600 - care transferred to DRAGAN Mcconnell NP    Amount and/or Complexity of Data Reviewed  Labs: ordered.  Radiology: ordered.    Risk  Prescription drug management.                                      Clinical Impression:   Final diagnoses:  [K59.00] Constipation, unspecified constipation type (Primary)        ED Disposition Condition    Discharge Stable          ED Prescriptions    None       Follow-up Information       Follow up With Specialties Details Why Contact Info    Marquez Burch MD Internal Medicine Schedule an appointment as soon as possible for a visit   [address] [place] [address] [address] [place] [address] [phone]                   [1]   Social History  Tobacco Use    Smoking status: Never    Smokeless tobacco: Never    Tobacco comments:     [facility]   Substance Use Topics    Alcohol use: Never    Drug use: Never        James Castellanos FNP  02/26/25 0815

## 2025-02-26 NOTE — DISCHARGE INSTRUCTIONS
Continue medications as previously directed. Make sure you are drinking plenty of fluids. Eat plenty of fresh fruits and vegetables. Follow up with your primary care provider for recheck and continued care and management. Return to the ED for worsening signs and symptoms or otherwise as needed.

## 2025-03-12 ENCOUNTER — OFFICE VISIT (OUTPATIENT)
Dept: CARDIOLOGY | Facility: CLINIC | Age: 70
End: 2025-03-12
Payer: MEDICARE

## 2025-03-12 VITALS
HEART RATE: 62 BPM | OXYGEN SATURATION: 99 % | HEIGHT: 67 IN | SYSTOLIC BLOOD PRESSURE: 124 MMHG | DIASTOLIC BLOOD PRESSURE: 76 MMHG | WEIGHT: 176 LBS | BODY MASS INDEX: 27.62 KG/M2

## 2025-03-12 DIAGNOSIS — K22.2 ESOPHAGEAL STRICTURE: ICD-10-CM

## 2025-03-12 DIAGNOSIS — I10 PRIMARY HYPERTENSION: ICD-10-CM

## 2025-03-12 DIAGNOSIS — E78.5 DYSLIPIDEMIA: ICD-10-CM

## 2025-03-12 DIAGNOSIS — I20.89 OTHER FORMS OF ANGINA PECTORIS: ICD-10-CM

## 2025-03-12 DIAGNOSIS — E11.9 TYPE 2 DIABETES MELLITUS WITHOUT COMPLICATION, WITHOUT LONG-TERM CURRENT USE OF INSULIN: ICD-10-CM

## 2025-03-12 DIAGNOSIS — G89.29 CHRONIC LEFT SHOULDER PAIN: ICD-10-CM

## 2025-03-12 DIAGNOSIS — I20.81 ANGINA PECTORIS WITH CORONARY MICROVASCULAR DYSFUNCTION: ICD-10-CM

## 2025-03-12 DIAGNOSIS — R00.2 PALPITATIONS: Primary | ICD-10-CM

## 2025-03-12 DIAGNOSIS — R00.1 BRADYCARDIA: ICD-10-CM

## 2025-03-12 DIAGNOSIS — R07.9 CHEST PAIN, UNSPECIFIED TYPE: ICD-10-CM

## 2025-03-12 DIAGNOSIS — M25.512 CHRONIC LEFT SHOULDER PAIN: ICD-10-CM

## 2025-03-12 PROCEDURE — 99215 OFFICE O/P EST HI 40 MIN: CPT | Mod: PBBFAC | Performed by: INTERNAL MEDICINE

## 2025-03-12 PROCEDURE — 3078F DIAST BP <80 MM HG: CPT | Mod: CPTII,,, | Performed by: INTERNAL MEDICINE

## 2025-03-12 PROCEDURE — 1159F MED LIST DOCD IN RCRD: CPT | Mod: CPTII,,, | Performed by: INTERNAL MEDICINE

## 2025-03-12 PROCEDURE — 3074F SYST BP LT 130 MM HG: CPT | Mod: CPTII,,, | Performed by: INTERNAL MEDICINE

## 2025-03-12 PROCEDURE — 1126F AMNT PAIN NOTED NONE PRSNT: CPT | Mod: CPTII,,, | Performed by: INTERNAL MEDICINE

## 2025-03-12 PROCEDURE — 99999 PR PBB SHADOW E&M-EST. PATIENT-LVL V: CPT | Mod: PBBFAC,,, | Performed by: INTERNAL MEDICINE

## 2025-03-12 PROCEDURE — 99205 OFFICE O/P NEW HI 60 MIN: CPT | Mod: S$PBB,,, | Performed by: INTERNAL MEDICINE

## 2025-03-12 PROCEDURE — 4010F ACE/ARB THERAPY RXD/TAKEN: CPT | Mod: CPTII,,, | Performed by: INTERNAL MEDICINE

## 2025-03-12 PROCEDURE — 3288F FALL RISK ASSESSMENT DOCD: CPT | Mod: CPTII,,, | Performed by: INTERNAL MEDICINE

## 2025-03-12 PROCEDURE — 1101F PT FALLS ASSESS-DOCD LE1/YR: CPT | Mod: CPTII,,, | Performed by: INTERNAL MEDICINE

## 2025-03-12 PROCEDURE — 3044F HG A1C LEVEL LT 7.0%: CPT | Mod: CPTII,,, | Performed by: INTERNAL MEDICINE

## 2025-03-12 PROCEDURE — 3008F BODY MASS INDEX DOCD: CPT | Mod: CPTII,,, | Performed by: INTERNAL MEDICINE

## 2025-03-12 NOTE — PATIENT INSTRUCTIONS
STOP TAKING ATENOLOL    START TAKING COREG 6.25MG TWICE DAILY    FARRUKHO MONITOR PLACED THIS APPT    ECHO SCHEDULED FOR    STRESS TEST SCHEDULED FOR    FOLLOW-UP WITH

## 2025-03-12 NOTE — PROGRESS NOTES
PCP: Marquez Burch MD    Referring Provider:     Subjective:   Amina Israel is a 70 y.o. female with hx of  bradycardia who presents for evaluation of slow heart beat.      Pt reports approximately one year ago developed slow heart beat while taking paxlovid.  She stopped meds, heart rate returned to normal at rest, however reports episodes of heart rate dropping, then going fast, come and goes spontaneously, lasts from seconds up to a minute.  She reports chest tightness and shortness of breath when she has these rhythm changes.  Chest tightness is different from what she recognizes as espophogeal pain. She was taken off flexeril, with no change in heart rate.   She has recently changed from atenolol 5o mg plus HCTZ 25 mg q d, to Atenolol 50 mg three in AM. She notes symptoms have changed since medication change.  She is active, was walking, riding bike up to total 8 miles a day, stopped after mechanical fall approximately one month ago.  She continues to walk with daily activities still getting 4000 steps a day.         Fhx:  Family History   Problem Relation Name Age of Onset    Hypertension Mother      Diabetic kidney disease Mother      Tuberculosis Father      Asbestos Brother      Cancer Brother      No Known Problems Daughter        Shx:   Past Surgical History:   Procedure Laterality Date    CERVICAL DISCECTOMY  2005    C2-3    FOOT SURGERY Bilateral 2001    HYSTERECTOMY  2003    OOPHORECTOMY      POLYPECTOMY      REDUCTION OF BOTH BREASTS Bilateral 2006    TONSILLECTOMY  1974    TOTAL REDUCTION MAMMOPLASTY         EKG - NSR, normal.     ECHO - No results found for this or any previous visit.       CATH - No results found for this or any previous visit.       Stress - No results found for this or any previous visit.       Lab Results   Component Value Date     02/25/2025    K 3.8 02/25/2025     02/25/2025    CO2 24 02/25/2025    BUN 10 02/25/2025    CREATININE 0.84 02/25/2025    CALCIUM 9.7  02/25/2025    ANIONGAP 13 02/25/2025       Lab Results   Component Value Date    CHOL 120 03/12/2024     Lab Results   Component Value Date    HDL 53 03/12/2024     Lab Results   Component Value Date    LDLCALC 51 03/12/2024     Lab Results   Component Value Date    TRIG 79 03/12/2024     Lab Results   Component Value Date    CHOLHDL 2.3 03/12/2024       Lab Results   Component Value Date    WBC 12.40 (H) 02/25/2025    HGB 14.8 02/25/2025    HCT 44.5 02/25/2025    MCV 89.2 02/25/2025     02/25/2025         Current Medications[1]    Review of Systems   Constitutional: Negative for diaphoresis, malaise/fatigue, night sweats and weight gain.   HENT:  Negative for congestion, ear pain, hearing loss, nosebleeds and sore throat.    Eyes:  Negative for blurred vision, double vision, pain, photophobia and visual disturbance.        Glacoma: controlled with drops.    Cardiovascular:  Positive for irregular heartbeat. Negative for chest pain, claudication, dyspnea on exertion, leg swelling, near-syncope, orthopnea, palpitations and syncope.        Chest tightness with palpitations.    Respiratory:  Positive for shortness of breath. Negative for cough, sleep disturbances due to breathing, snoring and wheezing.    Endocrine: Negative for cold intolerance, heat intolerance, polydipsia, polyphagia and polyuria.   Hematologic/Lymphatic: Negative for bleeding problem. Does not bruise/bleed easily.        Hx thyroid cancer, post iodine ablation.    Skin:  Negative for dry skin, flushing, itching, rash and skin cancer.   Musculoskeletal:  Positive for arthritis and back pain. Negative for falls, joint pain, muscle cramps, muscle weakness and myalgias.        Bilat OA in knees   Gastrointestinal:  Positive for constipation and heartburn. Negative for abdominal pain, change in bowel habit, diarrhea, dysphagia, nausea and vomiting.        Hx esophageal stricture, last dilated two months ago   Genitourinary:  Negative for bladder  "incontinence, dysuria, flank pain, frequency and nocturia.   Neurological:  Negative for dizziness, focal weakness, headaches, light-headedness, loss of balance, numbness, paresthesias and seizures.   Psychiatric/Behavioral:  Negative for depression, memory loss and substance abuse. The patient is not nervous/anxious.    Allergic/Immunologic: Negative for environmental allergies.          Objective:   /76 (BP Location: Left arm, Patient Position: Sitting)   Pulse 62   Ht 5' 7" (1.702 m)   Wt 79.8 kg (176 lb)   SpO2 99%   BMI 27.57 kg/m²       Physical Exam  Vitals and nursing note reviewed.   Constitutional:       Appearance: Normal appearance. She is obese.   HENT:      Head: Normocephalic and atraumatic.      Right Ear: External ear normal.      Left Ear: External ear normal.   Eyes:      General: No scleral icterus.        Right eye: No discharge.         Left eye: No discharge.      Extraocular Movements: Extraocular movements intact.      Conjunctiva/sclera: Conjunctivae normal.      Pupils: Pupils are equal, round, and reactive to light.   Cardiovascular:      Rate and Rhythm: Normal rate and regular rhythm.      Pulses: Normal pulses.      Heart sounds: Normal heart sounds. No murmur heard.     No friction rub. No gallop.   Pulmonary:      Effort: Pulmonary effort is normal.      Breath sounds: Normal breath sounds. No wheezing, rhonchi or rales.   Chest:      Chest wall: No tenderness.   Abdominal:      General: Abdomen is flat. Bowel sounds are normal. There is no distension.      Palpations: Abdomen is soft.      Tenderness: There is no abdominal tenderness. There is no guarding or rebound.   Musculoskeletal:         General: No swelling or tenderness. Normal range of motion.      Cervical back: Normal range of motion and neck supple.   Skin:     General: Skin is warm and dry.      Findings: No erythema or rash.   Neurological:      General: No focal deficit present.      Mental Status: She is " alert and oriented to person, place, and time.      Cranial Nerves: No cranial nerve deficit.      Motor: No weakness.      Gait: Gait normal.   Psychiatric:         Mood and Affect: Mood normal.         Behavior: Behavior normal.         Thought Content: Thought content normal.         Judgment: Judgment normal.         Assessment:     1. Palpitations  EKG 12-lead    Ambulatory referral/consult to Cardiology    EKG 12-lead    suspect due to recent changes in beta blocker, will DC atenolol, start Coreg 6.25 mg bid.      2. Primary hypertension      fairly well controlled on current meds, continue to monitor.      3. Dyslipidemia        4. Type 2 diabetes mellitus without complication, without long-term current use of insulin      well controlled, last HBA1C 5.5      5. Chronic left shoulder pain      likely musculoskeletal, however with other symptoms concerning for anginal equivalent, await results of stress imaging.      6. Bradycardia      unclear etiology, await results of echo and outpatient telemetry.      7. Esophageal stricture      chest tightness different from esophogeal discomfort.      8. Chest pain, unspecified type      precordial chest pain, associated with palpitations, will order stress imaging to eval for ischemic substrate.            Plan:   Hypertension  Controlled on current meds, will  continue to monitor with switch to Coreg, monitor bp AM and Pm X 1 WEEK AND CALL WITH RESULTS, FOLLOW UP WITH ME IN THREE TO FOUR WEEKS TO REVIEW RESULTS OF CARDIAC IMAGING, SOONER IF SYMPTOMS CHANGE.               [1]   Current Outpatient Medications:     aMILoride (MIDAMOR) 5 MG Tab, Take 1 tablet (5 mg total) by mouth once daily., Disp: 30 tablet, Rfl: 1    estradioL (ESTRACE) 0.01 % (0.1 mg/gram) vaginal cream, Place 1 g vaginally twice a week., Disp: 42.5 g, Rfl: 1    estrogens, conjugated, (PREMARIN) 1.25 MG tablet, Take 1 tablet (1.25 mg total) by mouth once daily., Disp: 90 tablet, Rfl: 1    glimepiride  (AMARYL) 2 MG tablet, Take 1 tablet (2 mg total) by mouth every morning., Disp: 90 tablet, Rfl: 1    levothyroxine (SYNTHROID) 100 MCG tablet, Take 1 tablet (100 mcg total) by mouth before breakfast., Disp: 30 tablet, Rfl: 1    LUMIGAN 0.01 % Drop, Place 1 drop into both eyes every evening., Disp: , Rfl:     pantoprazole (PROTONIX) 40 MG tablet, TAKE ONE TABLET BY MOUTH ONCE DAILY, Disp: 90 tablet, Rfl: 1    pregabalin (LYRICA) 150 MG capsule, Take 1 capsule (150 mg total) by mouth 2 (two) times daily., Disp: 180 capsule, Rfl: 1    rosuvastatin (CRESTOR) 10 MG tablet, Take 1 tablet (10 mg total) by mouth every evening., Disp: 90 tablet, Rfl: 1    valsartan (DIOVAN) 160 MG tablet, Take 1 tablet (160 mg total) by mouth every evening., Disp: 90 tablet, Rfl: 1    cyclobenzaprine (FLEXERIL) 10 MG tablet, Take 10 mg by mouth 3 (three) times daily as needed for Muscle spasms. (Patient not taking: Reported on 2/17/2025), Disp: , Rfl:     docusate sodium (COLACE) 100 MG capsule, Take 1 capsule (100 mg total) by mouth 2 (two) times daily. (Patient not taking: Reported on 3/12/2025), Disp: 30 capsule, Rfl: 0    estrogens, conjugated, (PREMARIN) 1.25 MG tablet, Take 1.25 mg by mouth once daily., Disp: , Rfl:     fluconazole (DIFLUCAN) 200 MG Tab, TAKE ONE TABLET BY MOUTH EVERY DAY (Patient not taking: Reported on 2/17/2025), Disp: 7 tablet, Rfl: 1    fluticasone propionate (FLONASE) 50 mcg/actuation nasal spray, 1 spray (50 mcg total) by Each Nostril route once daily. (Patient not taking: Reported on 2/17/2025), Disp: 15.8 mL, Rfl: 0    naproxen (NAPROSYN) 500 MG tablet, Take 1 tablet (500 mg total) by mouth 2 (two) times daily as needed (for pain)., Disp: 30 tablet, Rfl: 1    nirmatrelvir-ritonavir (PAXLOVID) 300 mg (150 mg x 2)-100 mg copackaged tablets (EUA), Take 3 tablets by mouth 2 (two) times daily. Each dose contains 2 nirmatrelvir (pink tablets) and 1 ritonavir (white tablet). Take all 3 tablets together (Patient not  taking: Reported on 2/17/2025), Disp: 30 tablet, Rfl: 0    oxybutynin (DITROPAN) 5 MG Tab, Take 1 tablet (5 mg total) by mouth every evening. (Patient not taking: Reported on 3/12/2025), Disp: 90 tablet, Rfl: 1    simethicone (MYLICON) 125 mg Cap capsule, Take 1 capsule (125 mg total) by mouth once daily. (Patient not taking: Reported on 3/12/2025), Disp: 3 capsule, Rfl: 0

## 2025-03-13 ENCOUNTER — TELEPHONE (OUTPATIENT)
Dept: CARDIOLOGY | Facility: CLINIC | Age: 70
End: 2025-03-13
Payer: MEDICARE

## 2025-03-13 RX ORDER — CARVEDILOL 6.25 MG/1
6.25 TABLET ORAL 2 TIMES DAILY WITH MEALS
Qty: 180 TABLET | Refills: 1 | Status: SHIPPED | OUTPATIENT
Start: 2025-03-13

## 2025-03-13 NOTE — TELEPHONE ENCOUNTER
Called the patient on today to give her date,time, and instructions of Allegra and Echo tests that were ordered on yesterday's visit 3/13/25. Went to patient's , left the patient a message to return my call when available. Patient's allegra and echo is scheduled for 4/3/25 at 8:00. Follow up is scheduled for 4/28/25 at 3:20 to review test results.

## 2025-03-14 NOTE — TELEPHONE ENCOUNTER
PT returned call. Went over acacia/echo instructions. Pt verbalized understanding. Pt aware of test date/time and f/u appt date/time.    Left msg for pt to return call concerning below encounter.

## 2025-03-17 ENCOUNTER — OFFICE VISIT (OUTPATIENT)
Dept: FAMILY MEDICINE | Facility: CLINIC | Age: 70
End: 2025-03-17
Payer: MEDICARE

## 2025-03-17 ENCOUNTER — APPOINTMENT (OUTPATIENT)
Dept: RADIOLOGY | Facility: CLINIC | Age: 70
End: 2025-03-17
Attending: INTERNAL MEDICINE
Payer: MEDICARE

## 2025-03-17 VITALS
OXYGEN SATURATION: 99 % | HEIGHT: 67 IN | SYSTOLIC BLOOD PRESSURE: 139 MMHG | BODY MASS INDEX: 27.15 KG/M2 | HEART RATE: 69 BPM | RESPIRATION RATE: 17 BRPM | TEMPERATURE: 98 F | WEIGHT: 173 LBS | DIASTOLIC BLOOD PRESSURE: 84 MMHG

## 2025-03-17 DIAGNOSIS — R10.9 ABDOMINAL PAIN, UNSPECIFIED ABDOMINAL LOCATION: Primary | ICD-10-CM

## 2025-03-17 DIAGNOSIS — M54.9 BACK PAIN, UNSPECIFIED BACK LOCATION, UNSPECIFIED BACK PAIN LATERALITY, UNSPECIFIED CHRONICITY: ICD-10-CM

## 2025-03-17 DIAGNOSIS — R10.9 ABDOMINAL PAIN, UNSPECIFIED ABDOMINAL LOCATION: ICD-10-CM

## 2025-03-17 PROCEDURE — 4010F ACE/ARB THERAPY RXD/TAKEN: CPT | Mod: ,,, | Performed by: INTERNAL MEDICINE

## 2025-03-17 PROCEDURE — 74018 RADEX ABDOMEN 1 VIEW: CPT | Mod: TC,RHCUB | Performed by: INTERNAL MEDICINE

## 2025-03-17 PROCEDURE — 1159F MED LIST DOCD IN RCRD: CPT | Mod: ,,, | Performed by: INTERNAL MEDICINE

## 2025-03-17 PROCEDURE — 3044F HG A1C LEVEL LT 7.0%: CPT | Mod: ,,, | Performed by: INTERNAL MEDICINE

## 2025-03-17 PROCEDURE — 74018 RADEX ABDOMEN 1 VIEW: CPT | Mod: 26,,, | Performed by: RADIOLOGY

## 2025-03-17 PROCEDURE — 3079F DIAST BP 80-89 MM HG: CPT | Mod: ,,, | Performed by: INTERNAL MEDICINE

## 2025-03-17 PROCEDURE — 3008F BODY MASS INDEX DOCD: CPT | Mod: ,,, | Performed by: INTERNAL MEDICINE

## 2025-03-17 PROCEDURE — 1125F AMNT PAIN NOTED PAIN PRSNT: CPT | Mod: ,,, | Performed by: INTERNAL MEDICINE

## 2025-03-17 PROCEDURE — 99213 OFFICE O/P EST LOW 20 MIN: CPT | Mod: ,,, | Performed by: INTERNAL MEDICINE

## 2025-03-17 PROCEDURE — 3075F SYST BP GE 130 - 139MM HG: CPT | Mod: ,,, | Performed by: INTERNAL MEDICINE

## 2025-03-17 NOTE — PROGRESS NOTES
"Subjective:       Patient ID: Amina Israel is a 70 y.o. female.    Chief Complaint: TCC Call    History of Present Illness    CHIEF COMPLAINT:  Patient presents today for follow up of constipation.    GI CONCERNS:  She reports no bowel movement for 8 days, which is significant deviation from her typical 3-5 daily bowel movements. She only experiences constipation when taking pain medication. Previous GI workup included a normal upper endoscopy. Previous GI doctor recommended treatment with 64 ounces of zero-calorie Gatorade mixed with 8 ounces of Miralax.    PAIN:  She reports right-sided body pain, which is exacerbated when lying on the opposite side. She describes the sensation as soreness and pressure in the back extending to spine. She has history of previous left-sided pain that mimicked heart attack symptoms.    MEDICAL HISTORY:  She has history of thyroid issues and acid reflux.    SOCIAL HISTORY:  She reports occasional alcohol use. She uses marijuana for pain management due to difficulty tolerating pain medications, with last use yesterday.         Current Medications:  Current Medications[1]           ROS  Twelve point system reviewed, unremarkable except for stated above in HPI.        Objective:         Vitals:    03/17/25 1414   BP: 139/84   BP Location: Left arm   Patient Position: Sitting   Pulse: 69   Resp: 17   Temp: 98.3 °F (36.8 °C)   TempSrc: Temporal   SpO2: 99%   Weight: 78.5 kg (173 lb)   Height: 5' 7" (1.702 m)        Physical Exam     Patient is awake alert oriented person place and  Lungs are clear to auscultation bilaterally no crackles or wheezes   Cardiovascular S1-S2 regular rate and rhythm no murmurs rubs or gallops   Abdomen is soft positive bowel sounds nontender, extremities no clubbing cyanosis edema  Neuro no focal neurological deficits  Skin warm and dry.     Last Labs:     Admission on 02/25/2025, Discharged on 02/25/2025   Component Date Value    Sodium 02/25/2025 138     " Potassium 02/25/2025 3.8     Chloride 02/25/2025 105     CO2 02/25/2025 24     Anion Gap 02/25/2025 13     Glucose 02/25/2025 119 (H)     BUN 02/25/2025 10     Creatinine 02/25/2025 0.84     BUN/Creatinine Ratio 02/25/2025 12     Calcium 02/25/2025 9.7     Total Protein 02/25/2025 7.8 (H)     Albumin 02/25/2025 4.3     Globulin 02/25/2025 3.5     A/G Ratio 02/25/2025 1.2     Bilirubin, Total 02/25/2025 0.6     Alk Phos 02/25/2025 63     ALT 02/25/2025 13     AST 02/25/2025 30     eGFR 02/25/2025 76     WBC 02/25/2025 12.40 (H)     RBC 02/25/2025 4.99     Hemoglobin 02/25/2025 14.8     Hematocrit 02/25/2025 44.5     MCV 02/25/2025 89.2     MCH 02/25/2025 29.7     MCHC 02/25/2025 33.3     RDW 02/25/2025 12.9     Platelet Count 02/25/2025 235     MPV 02/25/2025 10.6     Neutrophils % 02/25/2025 64.2     Lymphocytes % 02/25/2025 27.1     Monocytes % 02/25/2025 7.5 (H)     Eosinophils % 02/25/2025 0.4 (L)     Basophils % 02/25/2025 0.4     Immature Granulocytes % 02/25/2025 0.4     nRBC, Auto 02/25/2025 0.0     Neutrophils, Abs 02/25/2025 7.96 (H)     Lymphocytes, Absolute 02/25/2025 3.36     Monocytes, Absolute 02/25/2025 0.93 (H)     Eosinophils, Absolute 02/25/2025 0.05     Basophils, Absolute 02/25/2025 0.05     Immature Granulocytes, A* 02/25/2025 0.05 (H)     nRBC, Absolute 02/25/2025 0.00     Diff Type 02/25/2025 Auto     Lipase 02/25/2025 37     Magnesium 02/25/2025 2.1    Hospital Outpatient Visit on 02/24/2025   Component Date Value    POC Glucose 02/24/2025 72     Case Report 02/24/2025                      Value:Surgical Pathology                                Case: [id number]                                   Authorizing Provider:  Vahe Rey MD           Collected:           02/24/2025 01:22 PM          Ordering Location:     [facility] -         Received:            02/24/2025 02:52 PM                                 Endoscopy                                                                     Pathologist:           Corky Zacarias MD                                                           Specimens:   A) - Stomach, a. stomach bx.                                                                        B) - Esophagus, b. esophagus bx.                                                           Final Diagnosis 02/24/2025                      Value:A. Stomach, biopsies:   - Antral mucosa with minimal to mild chronic gastritis and reactive gastropathy  - Unremarkable oxyntic mucosa  - H. pylori immunohistochemistry is negative    B. Esophagus, biopsies:  Benign squamous mucosa; eosinophils are not readily identified        Gross Description 02/24/2025                      Value:A. Stomach: a. stomach bx.  The specimen is received in formalin designated a. stomach bx. and consists of 2 pink-tan tissue fragments that each measure 0.3 cm in greatest dimension and is entirely submitted in cassette A1.    Grossing was completed by eY Roman.  B. Esophagus: b. esophagus bx.  The specimen is received in formalin designated b. esophagus bx. and consists of 2 translucent white tissue fragments that each measure 0.3 cm in greatest dimension and is entirely submitted in cassette B1.    Grossing was completed by Ye Roman.      Microscopic Description 02/24/2025                      Value:A microscopic examination was performed and the diagnosis reflects the findings.          Laboratory Notes 02/24/2025                      Value:If this report includes immunohistochemical (IHC) test results, please note the following: IHC studies were interpreted in conjunction with appropriate positive and negative controls which demonstrate the expected positive and negative reactivity. This laboratory is regulated under CLIA as qualified to perform high-complexity testing. IHC tests are used for clinical purposes. They should not be regarded as investigational or research.           Last Imaging:  CT Abdomen Pelvis  With IV Contrast NO Oral Contrast  Narrative: EXAMINATION:  CT ABDOMEN PELVIS WITH IV CONTRAST    CLINICAL HISTORY:  Abdominal pain, acute, nonlocalized;    TECHNIQUE:  Low dose axial images, sagittal and coronal reformations were obtained from the lung bases to the pubic symphysis following the IV administration of 100 mL of Omnipaque 350 .  Oral contrast was not given.    COMPARISON:  Ultrasound 02/25/2025    FINDINGS:  Images of the lower thorax are remarkable for bilateral dependent atelectasis, minimal.    The liver is mildly hypoattenuating, possibly reflecting steatosis, correlation with LFTs recommended.  The spleen, pancreas, gallbladder and right adrenal gland are unremarkable.  There is a nodule within the left adrenal gland measuring 1.4 cm, nonspecific although may contain internal fat.  The stomach is nondistended, no gastric wall thickening.  There is a small hiatal hernia.  The portal vein, splenic vein, SMV, celiac axis and SMA all are patent.  No significant abdominal lymphadenopathy.    The kidneys enhance symmetrically without hydronephrosis or nephrolithiasis.  Several low attenuating lesions arise from the left kidney, the larger of which measure attenuation suggesting cysts, the smaller are too small for characterization.  Largest measures 3.3 cm.  The bilateral ureters are unremarkable without calculi seen.  The urinary bladder is unremarkable.  The uterus is absent the adnexa is unremarkable.    There are a few scattered colonic diverticula without inflammation to suggest diverticulitis.  The terminal ileum is unremarkable.  The appendix is unremarkable.  The small bowel is grossly unremarkable.  There are a few scattered shotty periaortic, pericaval, and mesenteric lymph nodes.  There is atherosclerotic calcification of the aorta and its branches.  No focal organized pelvic fluid collection.    There are degenerative changes of the bilateral sacroiliac joints and spine.  No significant  inguinal lymphadenopathy.  Impression: 1. Findings suggest hepatic steatosis, correlation with LFTs recommended.  2. No findings to suggest obstructive uropathy.  3. Please see above for several additional findings.    Electronically signed by: Sterling Olivas MD  Date:    02/25/2025  Time:    18:45  US Abdomen Limited_Gallbladder  Narrative: EXAMINATION:  US ABDOMEN LIMITED_GALLBLADDER    CLINICAL HISTORY:  RUQ abd pain;    TECHNIQUE:  Limited ultrasound of the right upper quadrant of the abdomen focused on the biliary system was performed.    COMPARISON:  None    FINDINGS:  The visualized portions of the pancreas are grossly unremarkable noting the pancreas is for the most part obscured by overlying soft tissue structures.  The gallbladder is nondistended.  No gallbladder wall thickening or pericholecystic fluid.  No gallbladder wall hyperemia.  Sonographic Lopez sign is negative.  The common duct is not dilated measuring 3 mm.  The hepatic parenchyma is homogeneous noting somewhat echogenic echotexture.  The liver is not enlarged.  The visualized portions of the spleen are unremarkable, for the most part obscured.  No ascites.  Impression: No findings to suggest acute cholecystitis.    Possible hepatic steatosis, correlation with LFTs recommended.    Electronically signed by: Sterling Olivas MD  Date:    02/25/2025  Time:    14:15  XR ABDOMEN, ACUTE 2 OR MORE VIEWS WITH CHEST  Narrative: EXAMINATION:  XR ABDOMEN ACUTE 2 OR MORE VIEWS WITH CHEST    CLINICAL HISTORY:  abdominal pain;    TECHNIQUE:  AP chest and upright and supine abdominal radiographs    COMPARISON:  [id number].    FINDINGS:  Mediastinal structures are midline.  Hilar contours are unremarkable.  Cardiac silhouette is normal in size.  Lung volumes are normal and symmetric.  No consolidation.  No pneumothorax or pleural effusion.    No intra-abdominal free air.  There is scattered gas within nondilated loops of bowel.  There is a mild  amount of retained fecal material throughout the visualized colon.  No abnormal calcifications.  No radiographic findings to suggest organomegaly or mass effect.  No acute osseous abnormalities.  Degenerative changes of the spine and bilateral hips.  Postsurgical changes of the cervical spine.  Impression: 1. No acute radiographic findings in the chest or abdomen.    Electronically signed by: Kendall Driscoll MD  Date:    02/25/2025  Time:    11:44         **Labs and x-rays personally reviewed by me    ** reviewed           Assessment & Plan:   Assessment & Plan    IMPRESSION:  - Assessed complaint of significant constipation, lasting 8 days.  - Considered history of constipation with pain medication use.  - Discontinued Toradol injections due to reported constipation side effect.  - Noted report of marijuana use for pain management.    CHRONIC CONSTIPATION:  - Referred the patient to Dr. eRy, gastroenterologist, for evaluation of recurrent constipation.  - Patient reports being constipated for 8 days with no gas or bowel movements.  - Noted that the patient typically has regular bowel movements 3-5 times daily when not taking pain medication.  - Ordered an abdominal x-ray to evaluate the constipation.  - Previously prescribed magnesium citrate and docusate sodium for constipation.  - Recalled that the gastroenterologist previously recommended 64 ounces of zero-calorie Gatorade with 8 ounces of polyethylene glycol for constipation.    THORACIC SPINE PAIN:  - Ordered thoracic spine x-ray to evaluate current condition.  - Patient reports pain in the spine, which worsens when lying down.  - Performed physical exam of the patient's abdomen and back.    INTERCOSTAL PAIN:  - Patient reports pain on the right side of the body, previously experienced on the left side.  - Performed physical exam, patient reports soreness in upper body.    HYPOTHYROIDISM:  - Noted that the patient reports having thyroid issues as part of  medical history.    GASTROESOPHAGEAL REFLUX DISEASE:  - Ordered abdominal x-ray to evaluate current condition.  - Patient reports having gastroesophageal reflux disease as part of medical history.  - Noted that the patient reports normal upper endoscopy results.    CANNABIS USE:  - Patient reports using cannabis for pain management.  - Inquired about recent cannabis use, patient reports last use was yesterday.    OTHER LIFESTYLE-RELATED ISSUES:  - Noted that the patient reports occasional alcohol consumption.    FOLLOW-UP:  - Follow up with nurse practitioner Ms. Beltrán for future appointments starting in April.  - Follow up with Dr. Payan as scheduled.           1. Abdominal pain, unspecified abdominal location  -     X-Ray KUB; Future; Expected date: 03/17/2025  -     Ambulatory referral/consult to Gastroenterology; Future; Expected date: 03/24/2025    2. Back pain, unspecified back location, unspecified back pain laterality, unspecified chronicity  -     X-Ray Lumbar Spine Ap And Lateral; Future; Expected date: 03/17/2025            Obed Durant MD  This note was generated with the assistance of ambient listening technology. Verbal consent was obtained by the patient and accompanying visitor(s) for the recording of patient appointment to facilitate this note. I attest to having reviewed and edited the generated note for accuracy, though some syntax or spelling errors may persist. Please contact the author of this note for any clarification.            [1]   Current Outpatient Medications:     aMILoride (MIDAMOR) 5 MG Tab, Take 1 tablet (5 mg total) by mouth once daily., Disp: 30 tablet, Rfl: 1    carvediloL (COREG) 6.25 MG tablet, Take 1 tablet (6.25 mg total) by mouth 2 (two) times daily with meals., Disp: 180 tablet, Rfl: 1    estradioL (ESTRACE) 0.01 % (0.1 mg/gram) vaginal cream, Place 1 g vaginally twice a week., Disp: 42.5 g, Rfl: 1    estrogens, conjugated, (PREMARIN) 1.25 MG tablet, Take 1.25 mg by  mouth once daily., Disp: , Rfl:     glimepiride (AMARYL) 2 MG tablet, Take 1 tablet (2 mg total) by mouth every morning., Disp: 90 tablet, Rfl: 1    levothyroxine (SYNTHROID) 100 MCG tablet, Take 1 tablet (100 mcg total) by mouth before breakfast., Disp: 30 tablet, Rfl: 1    LUMIGAN 0.01 % Drop, Place 1 drop into both eyes every evening., Disp: , Rfl:     pantoprazole (PROTONIX) 40 MG tablet, TAKE ONE TABLET BY MOUTH ONCE DAILY, Disp: 90 tablet, Rfl: 1    pregabalin (LYRICA) 150 MG capsule, Take 1 capsule (150 mg total) by mouth 2 (two) times daily., Disp: 180 capsule, Rfl: 1    rosuvastatin (CRESTOR) 10 MG tablet, Take 1 tablet (10 mg total) by mouth every evening., Disp: 90 tablet, Rfl: 1    valsartan (DIOVAN) 160 MG tablet, Take 1 tablet (160 mg total) by mouth every evening., Disp: 90 tablet, Rfl: 1    cyclobenzaprine (FLEXERIL) 10 MG tablet, Take 10 mg by mouth 3 (three) times daily as needed for Muscle spasms. (Patient not taking: Reported on 3/17/2025), Disp: , Rfl:     docusate sodium (COLACE) 100 MG capsule, Take 1 capsule (100 mg total) by mouth 2 (two) times daily. (Patient not taking: Reported on 3/17/2025), Disp: 30 capsule, Rfl: 0    estrogens, conjugated, (PREMARIN) 1.25 MG tablet, Take 1 tablet (1.25 mg total) by mouth once daily. (Patient not taking: Reported on 3/17/2025), Disp: 90 tablet, Rfl: 1    fluconazole (DIFLUCAN) 200 MG Tab, TAKE ONE TABLET BY MOUTH EVERY DAY (Patient not taking: Reported on 3/17/2025), Disp: 7 tablet, Rfl: 1    fluticasone propionate (FLONASE) 50 mcg/actuation nasal spray, 1 spray (50 mcg total) by Each Nostril route once daily. (Patient not taking: Reported on 3/17/2025), Disp: 15.8 mL, Rfl: 0    oxybutynin (DITROPAN) 5 MG Tab, Take 1 tablet (5 mg total) by mouth every evening. (Patient not taking: Reported on 3/17/2025), Disp: 90 tablet, Rfl: 1    simethicone (MYLICON) 125 mg Cap capsule, Take 1 capsule (125 mg total) by mouth once daily. (Patient not taking: Reported  on 3/17/2025), Disp: 3 capsule, Rfl: 0

## 2025-03-18 ENCOUNTER — HOSPITAL ENCOUNTER (EMERGENCY)
Facility: HOSPITAL | Age: 70
Discharge: HOME | End: 2025-03-18
Payer: MEDICARE

## 2025-03-18 VITALS
TEMPERATURE: 99 F | HEIGHT: 67 IN | BODY MASS INDEX: 29.35 KG/M2 | HEART RATE: 58 BPM | SYSTOLIC BLOOD PRESSURE: 166 MMHG | OXYGEN SATURATION: 100 % | RESPIRATION RATE: 14 BRPM | DIASTOLIC BLOOD PRESSURE: 86 MMHG | WEIGHT: 187 LBS

## 2025-03-18 DIAGNOSIS — K59.00 CONSTIPATION, UNSPECIFIED CONSTIPATION TYPE: ICD-10-CM

## 2025-03-18 DIAGNOSIS — R10.9 ABDOMINAL PAIN, UNSPECIFIED ABDOMINAL LOCATION: Primary | ICD-10-CM

## 2025-03-18 LAB
ALBUMIN SERPL BCP-MCNC: 3.7 G/DL (ref 3.4–4.8)
ALBUMIN/GLOB SERPL: 1.2 {RATIO}
ALP SERPL-CCNC: 57 U/L (ref 40–150)
ALT SERPL W P-5'-P-CCNC: 10 U/L
ANION GAP SERPL CALCULATED.3IONS-SCNC: 14 MMOL/L (ref 7–16)
AST SERPL W P-5'-P-CCNC: 21 U/L (ref 11–45)
BASOPHILS # BLD AUTO: 0.07 K/UL (ref 0–0.2)
BASOPHILS NFR BLD AUTO: 0.6 % (ref 0–1)
BILIRUB SERPL-MCNC: 0.4 MG/DL
BILIRUB UR QL STRIP: NEGATIVE
BUN SERPL-MCNC: 11 MG/DL (ref 10–20)
BUN/CREAT SERPL: 15 (ref 6–20)
CALCIUM SERPL-MCNC: 9.5 MG/DL (ref 8.4–10.2)
CHLORIDE SERPL-SCNC: 108 MMOL/L (ref 98–107)
CLARITY UR: CLEAR
CO2 SERPL-SCNC: 22 MMOL/L (ref 23–31)
COLOR UR: COLORLESS
CREAT SERPL-MCNC: 0.72 MG/DL (ref 0.55–1.02)
DIFFERENTIAL METHOD BLD: ABNORMAL
EGFR (NO RACE VARIABLE) (RUSH/TITUS): 91 ML/MIN/1.73M2
EOSINOPHIL # BLD AUTO: 0.17 K/UL (ref 0–0.5)
EOSINOPHIL NFR BLD AUTO: 1.5 % (ref 1–4)
ERYTHROCYTE [DISTWIDTH] IN BLOOD BY AUTOMATED COUNT: 13.2 % (ref 11.5–14.5)
GLOBULIN SER-MCNC: 3.1 G/DL (ref 2–4)
GLUCOSE SERPL-MCNC: 81 MG/DL (ref 82–115)
GLUCOSE UR STRIP-MCNC: NORMAL MG/DL
HCT VFR BLD AUTO: 44.8 % (ref 38–47)
HGB BLD-MCNC: 14.6 G/DL (ref 12–16)
IMM GRANULOCYTES # BLD AUTO: 0.04 K/UL (ref 0–0.04)
IMM GRANULOCYTES NFR BLD: 0.4 % (ref 0–0.4)
KETONES UR STRIP-SCNC: 10 MG/DL
LEUKOCYTE ESTERASE UR QL STRIP: NEGATIVE
LIPASE SERPL-CCNC: 29 U/L
LYMPHOCYTES # BLD AUTO: 2.49 K/UL (ref 1–4.8)
LYMPHOCYTES NFR BLD AUTO: 22 % (ref 27–41)
MCH RBC QN AUTO: 29.9 PG (ref 27–31)
MCHC RBC AUTO-ENTMCNC: 32.6 G/DL (ref 32–36)
MCV RBC AUTO: 91.8 FL (ref 80–96)
MONOCYTES # BLD AUTO: 0.83 K/UL (ref 0–0.8)
MONOCYTES NFR BLD AUTO: 7.3 % (ref 2–6)
MPC BLD CALC-MCNC: 10.8 FL (ref 9.4–12.4)
NEUTROPHILS # BLD AUTO: 7.73 K/UL (ref 1.8–7.7)
NEUTROPHILS NFR BLD AUTO: 68.2 % (ref 53–65)
NITRITE UR QL STRIP: NEGATIVE
NRBC # BLD AUTO: 0 X10E3/UL
NRBC, AUTO (.00): 0 %
PH UR STRIP: 7.5 PH UNITS
PLATELET # BLD AUTO: 182 K/UL (ref 150–400)
POTASSIUM SERPL-SCNC: 3.9 MMOL/L (ref 3.5–5.1)
PROT SERPL-MCNC: 6.8 G/DL (ref 5.8–7.6)
PROT UR QL STRIP: NEGATIVE
RBC # BLD AUTO: 4.88 M/UL (ref 4.2–5.4)
RBC # UR STRIP: ABNORMAL /UL
RBC #/AREA URNS HPF: 3 /HPF
SODIUM SERPL-SCNC: 140 MMOL/L (ref 136–145)
SP GR UR STRIP: 1.01
SQUAMOUS #/AREA URNS LPF: ABNORMAL /HPF
UROBILINOGEN UR STRIP-ACNC: NORMAL MG/DL
WBC # BLD AUTO: 11.33 K/UL (ref 4.5–11)
WBC #/AREA URNS HPF: 1 /HPF

## 2025-03-18 PROCEDURE — 96361 HYDRATE IV INFUSION ADD-ON: CPT

## 2025-03-18 PROCEDURE — 80053 COMPREHEN METABOLIC PANEL: CPT | Performed by: PHYSICIAN ASSISTANT

## 2025-03-18 PROCEDURE — 81003 URINALYSIS AUTO W/O SCOPE: CPT | Performed by: PHYSICIAN ASSISTANT

## 2025-03-18 PROCEDURE — 96374 THER/PROPH/DIAG INJ IV PUSH: CPT

## 2025-03-18 PROCEDURE — 36415 COLL VENOUS BLD VENIPUNCTURE: CPT | Performed by: PHYSICIAN ASSISTANT

## 2025-03-18 PROCEDURE — 85025 COMPLETE CBC W/AUTO DIFF WBC: CPT | Performed by: PHYSICIAN ASSISTANT

## 2025-03-18 PROCEDURE — 99284 EMERGENCY DEPT VISIT MOD MDM: CPT | Mod: 25

## 2025-03-18 PROCEDURE — 25000003 PHARM REV CODE 250: Performed by: NURSE PRACTITIONER

## 2025-03-18 PROCEDURE — 83690 ASSAY OF LIPASE: CPT | Performed by: PHYSICIAN ASSISTANT

## 2025-03-18 PROCEDURE — 63600175 PHARM REV CODE 636 W HCPCS: Performed by: NURSE PRACTITIONER

## 2025-03-18 RX ORDER — ONDANSETRON HYDROCHLORIDE 2 MG/ML
4 INJECTION, SOLUTION INTRAVENOUS
Status: COMPLETED | OUTPATIENT
Start: 2025-03-18 | End: 2025-03-18

## 2025-03-18 RX ORDER — POLYETHYLENE GLYCOL 3350 17 G/17G
17 POWDER, FOR SOLUTION ORAL DAILY
Qty: 24 EACH | Refills: 0 | Status: SHIPPED | OUTPATIENT
Start: 2025-03-18

## 2025-03-18 RX ORDER — DOCUSATE SODIUM 100 MG/1
100 CAPSULE, LIQUID FILLED ORAL 2 TIMES DAILY
Qty: 60 CAPSULE | Refills: 0 | Status: SHIPPED | OUTPATIENT
Start: 2025-03-18

## 2025-03-18 RX ADMIN — SODIUM CHLORIDE 1000 ML: 9 INJECTION, SOLUTION INTRAVENOUS at 03:03

## 2025-03-18 RX ADMIN — ONDANSETRON 4 MG: 2 INJECTION INTRAMUSCULAR; INTRAVENOUS at 03:03

## 2025-03-18 NOTE — DISCHARGE INSTRUCTIONS
MiraLax and Colace as prescribed and directed.  Obtain and follow up with a primary care provider in 2 days.  Return to the emergency department for any increase in symptoms or for any other new or worrisome symptoms.

## 2025-03-18 NOTE — FIRST PROVIDER EVALUATION
" Emergency Department TeleTriage Encounter Note      CHIEF COMPLAINT    Chief Complaint   Patient presents with    Abdominal Pain     Pt reports abd pain that has been ongoing since Feb. 18th. She reports she followed up with PCP yesterday and was "brushed off. She reports worsening abd pain        VITAL SIGNS   Initial Vitals [03/18/25 1141]   BP Pulse Resp Temp SpO2   (!) 182/84 (!) 57 17 98.6 °F (37 °C) 99 %      MAP       --            ALLERGIES    Review of patient's allergies indicates:   Allergen Reactions    Metformin Diarrhea    Phenergan plain Nausea And Vomiting       PROVIDER TRIAGE NOTE  Patient presents with right low back pain radiating to right upper and lower quadrant. + vomiting this morning. Constipation for 1 month.       ORDERS  Labs Reviewed - No data to display    ED Orders (720h ago, onward)      None              Virtual Visit Note: The provider triage portion of this emergency department evaluation and documentation was performed via [employer], a HIPAA-compliant telemedicine application, in concert with a tele-presenter in the room. A face to face patient evaluation with one of my colleagues will occur once the patient is placed in an emergency department room.      DISCLAIMER: This note was prepared with [employer] voice recognition transcription software. Garbled syntax, mangled pronouns, and other bizarre constructions may be attributed to that software system.    "

## 2025-03-18 NOTE — ED PROVIDER NOTES
"Encounter Date: 3/18/2025       History     Chief Complaint   Patient presents with    Abdominal Pain     Pt reports abd pain that has been ongoing since Feb. 18th. She reports she followed up with PCP yesterday and was "brushed off. She reports worsening abd pain      70-year-old female who presents to the emergency department to be evaluated for right side abdominal pain and constipation.  This is a chronic issue that she experiences any time that she has pain medication.  She does report some watery stool within the last few days, as well as nausea.  She is unable to tolerate solid foods due to nausea and constipation increasing in severity.    The history is provided by the patient.   Abdominal Pain  The current episode started several weeks ago. The problem has not changed since onset.The abdominal pain is located in the right flank. The abdominal pain does not radiate. The abdominal pain is relieved by bowel movement. The other symptoms of the illness include nausea and diarrhea. The other symptoms of the illness do not include fever, fatigue or vomiting.   The diarrhea began more than 1 week ago. The diarrhea is semi-solid. The diarrhea occurs 2 - 4 times per day.   Nausea began more than 1 week ago. The nausea is associated with eating.   The illness is associated with laxative use. Additional symptoms associated with the illness include constipation and back pain. Symptoms associated with the illness do not include chills or hematuria.     Review of patient's allergies indicates:   Allergen Reactions    Metformin Diarrhea    Phenergan plain Nausea And Vomiting     Past Medical History:   Diagnosis Date    Diabetes mellitus, type 2     GERD (gastroesophageal reflux disease)     History of thyroid cancer 2005    Hyperlipidemia     Hypertension     Hypothyroidism     Neuropathy     Spinal stenosis      Past Surgical History:   Procedure Laterality Date    CERVICAL DISCECTOMY  2005    C2-3    FOOT SURGERY " Bilateral 2001    HYSTERECTOMY  2003    OOPHORECTOMY      POLYPECTOMY      REDUCTION OF BOTH BREASTS Bilateral 2006    TONSILLECTOMY  1974    TOTAL REDUCTION MAMMOPLASTY       Family History   Problem Relation Name Age of Onset    Hypertension Mother      Diabetic kidney disease Mother      Tuberculosis Father      Asbestos Brother      Cancer Brother      No Known Problems Daughter       Social History[1]  Review of Systems   Constitutional:  Positive for appetite change. Negative for chills, fatigue, fever and unexpected weight change.   HENT: Negative.     Eyes: Negative.    Respiratory: Negative.     Cardiovascular: Negative.    Gastrointestinal:  Positive for abdominal pain, constipation, diarrhea and nausea. Negative for blood in stool and vomiting.   Endocrine: Negative.    Genitourinary: Negative.  Negative for hematuria.   Musculoskeletal:  Positive for back pain.   Skin: Negative.    Allergic/Immunologic: Negative.    Neurological: Negative.    Hematological: Negative.    Psychiatric/Behavioral: Negative.         Physical Exam     Initial Vitals [03/18/25 1141]   BP Pulse Resp Temp SpO2   (!) 182/84 (!) 57 17 98.6 °F (37 °C) 99 %      MAP       --         Physical Exam    Vitals reviewed.  Constitutional: She appears well-developed and well-nourished. She is not diaphoretic. No distress.   HENT:   Head: Normocephalic and atraumatic.   Right Ear: External ear normal.   Left Ear: External ear normal.   Nose: Nose normal. Mouth/Throat: No oropharyngeal exudate.   Eyes: Conjunctivae and EOM are normal. Pupils are equal, round, and reactive to light. Right eye exhibits no discharge. Left eye exhibits no discharge. No scleral icterus.   Neck: Neck supple. No tracheal deviation present.   Normal range of motion.  Cardiovascular:  Normal rate, regular rhythm and intact distal pulses.           Pulmonary/Chest: Breath sounds normal. No stridor. No respiratory distress. She has no wheezes. She has no rhonchi. She  has no rales. She exhibits no tenderness.   Abdominal: Abdomen is soft. She exhibits no distension. Bowel sounds are decreased. There is abdominal tenderness in the right upper quadrant and right lower quadrant.   There is right CVA tenderness.  No left CVA tenderness.     There is no rebound, no guarding, no tenderness at McBurney's point and negative Lopez's sign. negative Rovsing's sign  Musculoskeletal:         General: No tenderness or edema. Normal range of motion.      Cervical back: Normal range of motion and neck supple.     Lymphadenopathy:     She has no cervical adenopathy.   Neurological: She is alert and oriented to person, place, and time. She has normal strength. No cranial nerve deficit or sensory deficit. GCS score is 15. GCS eye subscore is 4. GCS verbal subscore is 5. GCS motor subscore is 6.   Skin: Skin is warm and dry. Capillary refill takes less than 2 seconds.   Psychiatric: Thought content normal.         Medical Screening Exam   See Full Note    ED Course   Procedures  Labs Reviewed   COMPREHENSIVE METABOLIC PANEL - Abnormal       Result Value    Sodium 140      Potassium 3.9      Chloride 108 (*)     CO2 22 (*)     Anion Gap 14      Glucose 81 (*)     BUN 11      Creatinine 0.72      BUN/Creatinine Ratio 15      Calcium 9.5      Total Protein 6.8      Albumin 3.7      Globulin 3.1      A/G Ratio 1.2      Bilirubin, Total 0.4      Alk Phos 57      ALT 10      AST 21      eGFR 91     URINALYSIS, REFLEX TO URINE CULTURE - Abnormal    Color, UA Colorless      Clarity, UA Clear      pH, UA 7.5      Leukocytes, UA Negative      Nitrites, UA Negative      Protein, UA Negative      Glucose, UA Normal      Ketones, UA 10 (*)     Urobilinogen, UA Normal      Bilirubin, UA Negative      Blood, UA Trace (*)     Specific Sanderson, UA 1.011     CBC WITH DIFFERENTIAL - Abnormal    WBC 11.33 (*)     RBC 4.88      Hemoglobin 14.6      Hematocrit 44.8      MCV 91.8      MCH 29.9      MCHC 32.6      RDW  13.2      Platelet Count 182      MPV 10.8      Neutrophils % 68.2 (*)     Lymphocytes % 22.0 (*)     Monocytes % 7.3 (*)     Eosinophils % 1.5      Basophils % 0.6      Immature Granulocytes % 0.4      nRBC, Auto 0.0      Neutrophils, Abs 7.73 (*)     Lymphocytes, Absolute 2.49      Monocytes, Absolute 0.83 (*)     Eosinophils, Absolute 0.17      Basophils, Absolute 0.07      Immature Granulocytes, Absolute 0.04      nRBC, Absolute 0.00      Diff Type Auto     URINALYSIS, MICROSCOPIC - Abnormal    WBC, UA 1      RBC, UA 3      Squamous Epithelial Cells, UA Occasional (*)    LIPASE - Normal    Lipase 29     CBC W/ AUTO DIFFERENTIAL    Narrative:     The following orders were created for panel order CBC W/ AUTO DIFFERENTIAL.  Procedure                               Abnormality         Status                     ---------                               -----------         ------                     CBC with Differential[[id number]]       Abnormal            Final result                 Please view results for these tests on the individual orders.          Imaging Results    None          Medications   sodium chloride 0.9% bolus 1,000 mL 1,000 mL (0 mLs Intravenous Stopped 3/18/25 1655)   ondansetron injection 4 mg (4 mg Intravenous Given 3/18/25 1555)     Medical Decision Making  70-year-old female who presents to the emergency department to be evaluated for right side abdominal pain and constipation.  This is a chronic issue that she experiences any time that she has pain medication.  She does report some watery stool within the last few days, as well as nausea.  She is unable to tolerate solid foods due to nausea and constipation increasing in severity.    Patient was tele triaged and labs ordered.   Labs reviewed.  Radiology images from 03/17/2025 with her PCP reviewed.    Diagnosis:  Abdominal pain; Constipation, chronic  Symptoms follow administration of any type of pain medication.    Prescribed MiraLax and  Colace  Encouraged to drink plenty of fluids and to obtain a primary care provider for follow-up.        Risk  OTC drugs.  Prescription drug management.                                      Clinical Impression:   Final diagnoses:  [R10.9] Abdominal pain, unspecified abdominal location (Primary)  [K59.00] Constipation, unspecified constipation type        ED Disposition Condition    Discharge Stable          ED Prescriptions       Medication Sig Dispense Start Date End Date Auth. Provider    polyethylene glycol (GLYCOLAX) 17 gram PwPk Take 17 g by mouth once daily. 24 each 3/18/2025 -- James Castellanos FNP    docusate sodium (COLACE) 100 MG capsule Take 1 capsule (100 mg total) by mouth 2 (two) times daily. 60 capsule 3/18/2025 -- James Castellanos FNP          Follow-up Information    None              [1]   Social History  Tobacco Use    Smoking status: Never    Smokeless tobacco: Never    Tobacco comments:     Tesha   Substance Use Topics    Alcohol use: Never    Drug use: Never        James Castellanos FNP  03/18/25 0287

## 2025-03-19 ENCOUNTER — RESULTS FOLLOW-UP (OUTPATIENT)
Dept: FAMILY MEDICINE | Facility: CLINIC | Age: 70
End: 2025-03-19
Payer: MEDICARE

## 2025-03-19 ENCOUNTER — TELEPHONE (OUTPATIENT)
Dept: EMERGENCY MEDICINE | Facility: HOSPITAL | Age: 70
End: 2025-03-19
Payer: MEDICARE

## 2025-03-19 ENCOUNTER — PATIENT MESSAGE (OUTPATIENT)
Dept: FAMILY MEDICINE | Facility: CLINIC | Age: 70
End: 2025-03-19
Payer: MEDICARE

## 2025-03-19 ENCOUNTER — TELEPHONE (OUTPATIENT)
Dept: FAMILY MEDICINE | Facility: CLINIC | Age: 70
End: 2025-03-19
Payer: MEDICARE

## 2025-03-19 NOTE — TELEPHONE ENCOUNTER
----- Message from Obed Durant MD sent at 3/19/2025 12:46 PM CDT -----  Need to see in  1 week please  abnl results   ----- Message -----  From: Interface, Rad Results In  Sent: 3/19/2025  12:40 PM CDT  To: Obed Durant MD    [id number] Message sent to pt portal

## 2025-03-20 ENCOUNTER — TELEPHONE (OUTPATIENT)
Dept: NEPHROLOGY | Facility: CLINIC | Age: 70
End: 2025-03-20
Payer: MEDICARE

## 2025-03-20 ENCOUNTER — OFFICE VISIT (OUTPATIENT)
Dept: GASTROENTEROLOGY | Facility: CLINIC | Age: 70
End: 2025-03-20
Payer: MEDICARE

## 2025-03-20 VITALS
OXYGEN SATURATION: 98 % | WEIGHT: 173.19 LBS | HEART RATE: 73 BPM | HEIGHT: 67 IN | DIASTOLIC BLOOD PRESSURE: 94 MMHG | SYSTOLIC BLOOD PRESSURE: 148 MMHG | BODY MASS INDEX: 27.18 KG/M2

## 2025-03-20 DIAGNOSIS — K59.04 CHRONIC IDIOPATHIC CONSTIPATION: ICD-10-CM

## 2025-03-20 DIAGNOSIS — R10.9 ABDOMINAL PAIN, UNSPECIFIED ABDOMINAL LOCATION: ICD-10-CM

## 2025-03-20 DIAGNOSIS — R11.2 NAUSEA AND VOMITING, UNSPECIFIED VOMITING TYPE: Primary | ICD-10-CM

## 2025-03-20 DIAGNOSIS — R10.10 UPPER ABDOMINAL PAIN, UNSPECIFIED: ICD-10-CM

## 2025-03-20 PROCEDURE — 3077F SYST BP >= 140 MM HG: CPT | Mod: CPTII,,,

## 2025-03-20 PROCEDURE — 3008F BODY MASS INDEX DOCD: CPT | Mod: CPTII,,,

## 2025-03-20 PROCEDURE — 1160F RVW MEDS BY RX/DR IN RCRD: CPT | Mod: CPTII,,,

## 2025-03-20 PROCEDURE — 3044F HG A1C LEVEL LT 7.0%: CPT | Mod: CPTII,,,

## 2025-03-20 PROCEDURE — 3066F NEPHROPATHY DOC TX: CPT | Mod: CPTII,,,

## 2025-03-20 PROCEDURE — 3080F DIAST BP >= 90 MM HG: CPT | Mod: CPTII,,,

## 2025-03-20 PROCEDURE — 99215 OFFICE O/P EST HI 40 MIN: CPT | Mod: PBBFAC

## 2025-03-20 PROCEDURE — 99999 PR PBB SHADOW E&M-EST. PATIENT-LVL V: CPT | Mod: PBBFAC,,,

## 2025-03-20 PROCEDURE — 4010F ACE/ARB THERAPY RXD/TAKEN: CPT | Mod: CPTII,,,

## 2025-03-20 PROCEDURE — 1159F MED LIST DOCD IN RCRD: CPT | Mod: CPTII,,,

## 2025-03-20 PROCEDURE — 99214 OFFICE O/P EST MOD 30 MIN: CPT | Mod: S$PBB,,,

## 2025-03-20 RX ORDER — KETOCONAZOLE 20 MG/G
CREAM TOPICAL
COMMUNITY
Start: 2024-10-07 | End: 2025-03-24

## 2025-03-20 RX ORDER — TRAVOPROST OPHTHALMIC SOLUTION 0.04 MG/ML
SOLUTION OPHTHALMIC
COMMUNITY
End: 2025-03-24

## 2025-03-20 NOTE — PROGRESS NOTES
Gastroenterology Clinic Note    Patient ID: [id number]   Referring MD: Obed Durant MD   Chief Complaint:   Chief Complaint   Patient presents with    Constipation     Abdominal Pain       History of Present Illness   Amina Israel is an 70 y.o. AAF who is referred for abdominal pain.  Patient endorses right upper quadrant abdominal pain.  Symptoms have been ongoing for several months.  She has occasional nausea and vomiting.  Her pain is exacerbated with meals.  She also has chronic constipation for many years now.  She associates the constipation when she takes pain medication.  She does not have issues with constipation constantly.  She notes some improvement in her right-sided abdominal pain since having more regular bowel movements yesterday and today.  Her last bowel movement was this morning.  She was seen in the emergency department 02/2025 with abdominal pain and had CT of the abdomen and pelvis along with a gallbladder ultrasound.  No acute findings on either studies.  Recent EGD with Dr. Rey 02/2024 revealed a 2 cm hiatal hernia and gastritis.  She did have a dilation.  Prior to this she had experienced 1 episode of dark, black stools.  This has resolved.  CBC in the ER along with the CBC checked at follow-up with her PCP were within normal range.      Previous workup:  CT abdomen pelvis with IV contrast; gallbladder ultrasound; EGD    Last colonoscopy was 08/26/2024 with 3 year recall for screening purposes.    Review of Systems   Constitutional:  Negative for weight loss.   Gastrointestinal:  Positive for abdominal pain, constipation, nausea and vomiting. Negative for blood in stool, diarrhea, heartburn and melena.       Past Medical History      Past Medical History:   Diagnosis Date    Arthritis 2013    Cancer 2/10/2010    Pipulary throid cancer    Crohn's disease 2012    Diabetes mellitus, type 2     GERD (gastroesophageal reflux disease)     History of thyroid cancer 2005    Hyperlipidemia      Hypertension     Hypothyroidism     Immune disorder 2010    Neuropathy     Obesity     Other and unspecified hyperlipidemia     Spinal stenosis     Spondylolisthesis        Past Surgical History     Past Surgical History:   Procedure Laterality Date    BREAST SURGERY      Disecctomy/breast reduction    CERVICAL DISCECTOMY      C2-3     SECTION  2025    COSMETIC SURGERY  2006    Breast reduction    FOOT SURGERY Bilateral 2001    HYSTERECTOMY  2003    OOPHORECTOMY      POLYPECTOMY      REDUCTION OF BOTH BREASTS Bilateral 2006    SPINE SURGERY  2005    Disectomy with bone fusion    TONSILLECTOMY  1974    TOTAL REDUCTION MAMMOPLASTY         Allergies     Review of patient's allergies indicates:   Allergen Reactions    Metformin Diarrhea    Phenergan plain Nausea And Vomiting       Immunization History     Immunization History   Administered Date(s) Administered    COVID-19, MRNA, LN-S, PF (MODERNA FULL 0.5 ML DOSE) 2021, 2021, 10/20/2021    Influenza - Quadrivalent 10/11/2021       Past Family History      Family History   Problem Relation Name Age of Onset    Hypertension Mother Clotill Culver     Diabetic kidney disease Mother Clotill Culver     Arthritis Mother Clotill Culver     Diabetes Mother Clotill Culver     Heart disease Mother Clotill Culver     Stroke Mother Clotill Culver     Tuberculosis Father      Asbestos Brother      Cancer Brother Manuel culver Jr     Melanoma Brother Manuel culver Jr     No Known Problems Daughter      Eczema Sister Pat Mera        Past Social History    Social History[1]    Current Medications     No outpatient medications have been marked as taking for the 3/20/25 encounter (Office Visit) with Kesha Padron FNP.        I have reviewed the current medications, allergies, vital signs, past medical and surgical history, family medical history, and social history for this encounter and agree with all  "findings.    OBJECTIVE    Physical Exam    BP (!) 148/94   Pulse 73   Ht 5' 7" (1.702 m)   Wt 78.6 kg (173 lb 3.2 oz)   SpO2 98%   BMI 27.13 kg/m²   GEN: Well appearing, cooperative, NAD  NECK: Supple, no LAD  CV: Normal rate  RESP: Unlabored  ABD: ND, mildly TTP epigastric area, soft, no guarding  EXT: No clubbing, cyanosis, or edema  SKIN: Warm and dry  NEURO: AAO x4.     LABS    CBC (with or without Differential):   Lab Results   Component Value Date    WBC 11.33 (H) 03/18/2025    HGB 14.6 03/18/2025    HCT 44.8 03/18/2025    MCV 91.8 03/18/2025    MCH 29.9 03/18/2025    MCHC 32.6 03/18/2025    RDW 13.2 03/18/2025     03/18/2025    MPV 10.8 03/18/2025    NEUTOPHILPCT 68.2 (H) 03/18/2025    DIFFTYPE Auto 03/18/2025     BMP/CMP:   Lab Results   Component Value Date     03/18/2025    K 3.9 03/18/2025     (H) 03/18/2025    CO2 22 (L) 03/18/2025    BUN 11 03/18/2025    CREATININE 0.72 03/18/2025    GLU 81 (L) 03/18/2025    CALCIUM 9.5 03/18/2025    ALBUMIN 3.7 03/18/2025    AST 21 03/18/2025    ALT 10 03/18/2025    ALKPHOS 57 03/18/2025    MG 2.1 02/25/2025        IMAGING  CT abdomen pelvis with IV contrast 02/2025  1. Findings suggest hepatic steatosis, correlation with LFTs recommended.  2. No findings to suggest obstructive uropathy.  3. Please see above for several additional findings.    Ultrasound abdomen limited 02/2025  - No findings to suggest acute cholecystitis.  - Possible hepatic steatosis, correlation with LFTs recommended.      ASSESSMENT  mAina Israel is a 70 y.o. AAF with history of hypertension and chronic constipation who is referred for abdominal pain.    1. Nausea and vomiting, unspecified vomiting type    2. Abdominal pain, unspecified abdominal location    3. Upper abdominal pain, unspecified    4. Chronic idiopathic constipation           PLAN    - schedule HIDA scan to rule out gallbladder dyskinesia  - Linzess 145 mcg daily for chronic constipation  - return to GI " clinic for follow-up in 6 months, sooner as needed     There are no Patient Instructions on file for this visit.      Orders Placed This Encounter   Procedures    NM Hepatobiliary (HIDA) W Pharm and EF When Performed     Standing Status:   Future     Number of Occurrences:   1     Expected Date:   3/20/2025     Expiration Date:   3/20/2026     May the Radiologist modify the order per protocol to meet the clinical needs of the patient?:   Yes         The risks and benefits of my recommendations, as well as other treatment options were discussed with the patient today. All questions were answered.    35 minutes of total time spent on the encounter, which includes face to face time and non-face to face time preparing to see the patient (eg, review of tests), obtaining and/or reviewing separately obtained history, documenting clinical information in the electronic or other health record, Independently interpreting results (not separately reported) and communicating results to the patient/family/caregiver, or care coordination (not separately reported).        Kesha Padron, RENETTAP/ACNP  [facility]         [1]   Social History  Socioeconomic History    Marital status:    Tobacco Use    Smoking status: Never    Smokeless tobacco: Never    Tobacco comments:     Tesha   Substance and Sexual Activity    Alcohol use: Never    Drug use: Never    Sexual activity: Not Currently     Social Drivers of Health     Financial Resource Strain: High Risk (3/5/2025)    Overall Financial Resource Strain (CARDIA)     Difficulty of Paying Living Expenses: Hard   Food Insecurity: Food Insecurity Present (3/5/2025)    Hunger Vital Sign     Worried About Running Out of Food in the Last Year: Sometimes true     Ran Out of Food in the Last Year: Sometimes true   Transportation Needs: Unmet Transportation Needs (3/5/2025)    PRAPARE - Transportation     Lack of Transportation (Medical): Yes     Lack of Transportation  (Non-Medical): Yes   Physical Activity: Sufficiently Active (3/5/2025)    Exercise Vital Sign     Days of Exercise per Week: 7 days     Minutes of Exercise per Session: 30 min   Stress: Stress Concern Present (3/5/2025)    [employer] [place] [employer] - Occupational Stress Questionnaire     Feeling of Stress : To some extent   Housing Stability: Low Risk  (3/5/2025)    Housing Stability Vital Sign     Unable to Pay for Housing in the Last Year: No     Number of Times Moved in the Last Year: 0     Homeless in the Last Year: No   Recent Concern: Housing Stability - High Risk (12/12/2024)    Received from [facility] SDOH Screening     In the past year, have you been unable to get any of the following when you really needed them? choose all that apply.: Utilities (electric, gas, and water)

## 2025-03-21 DIAGNOSIS — M25.551 BILATERAL HIP PAIN: Primary | ICD-10-CM

## 2025-03-21 DIAGNOSIS — M25.552 BILATERAL HIP PAIN: Primary | ICD-10-CM

## 2025-03-24 ENCOUNTER — HOSPITAL ENCOUNTER (OUTPATIENT)
Dept: RADIOLOGY | Facility: HOSPITAL | Age: 70
Discharge: HOME | End: 2025-03-24
Attending: ORTHOPAEDIC SURGERY
Payer: MEDICARE

## 2025-03-24 ENCOUNTER — OFFICE VISIT (OUTPATIENT)
Dept: ORTHOPEDICS | Facility: CLINIC | Age: 70
End: 2025-03-24
Payer: MEDICARE

## 2025-03-24 ENCOUNTER — OFFICE VISIT (OUTPATIENT)
Dept: NEPHROLOGY | Facility: CLINIC | Age: 70
End: 2025-03-24
Payer: MEDICARE

## 2025-03-24 VITALS
HEIGHT: 67 IN | WEIGHT: 171 LBS | DIASTOLIC BLOOD PRESSURE: 80 MMHG | SYSTOLIC BLOOD PRESSURE: 138 MMHG | HEART RATE: 82 BPM | OXYGEN SATURATION: 99 % | RESPIRATION RATE: 18 BRPM | BODY MASS INDEX: 26.84 KG/M2

## 2025-03-24 DIAGNOSIS — M25.552 BILATERAL HIP PAIN: ICD-10-CM

## 2025-03-24 DIAGNOSIS — M25.551 BILATERAL HIP PAIN: ICD-10-CM

## 2025-03-24 DIAGNOSIS — M54.50 CHRONIC BILATERAL LOW BACK PAIN WITHOUT SCIATICA: Primary | ICD-10-CM

## 2025-03-24 DIAGNOSIS — G89.29 CHRONIC BILATERAL LOW BACK PAIN WITHOUT SCIATICA: Primary | ICD-10-CM

## 2025-03-24 DIAGNOSIS — I10 ESSENTIAL HYPERTENSION: Primary | ICD-10-CM

## 2025-03-24 DIAGNOSIS — R80.9 PROTEINURIA, UNSPECIFIED TYPE: ICD-10-CM

## 2025-03-24 PROCEDURE — 99204 OFFICE O/P NEW MOD 45 MIN: CPT | Mod: S$PBB,,, | Performed by: ORTHOPAEDIC SURGERY

## 2025-03-24 PROCEDURE — 1160F RVW MEDS BY RX/DR IN RCRD: CPT | Mod: CPTII,,, | Performed by: ORTHOPAEDIC SURGERY

## 2025-03-24 PROCEDURE — 1101F PT FALLS ASSESS-DOCD LE1/YR: CPT | Mod: CPTII,,, | Performed by: INTERNAL MEDICINE

## 2025-03-24 PROCEDURE — 4010F ACE/ARB THERAPY RXD/TAKEN: CPT | Mod: CPTII,,, | Performed by: ORTHOPAEDIC SURGERY

## 2025-03-24 PROCEDURE — 3044F HG A1C LEVEL LT 7.0%: CPT | Mod: CPTII,,, | Performed by: INTERNAL MEDICINE

## 2025-03-24 PROCEDURE — 3066F NEPHROPATHY DOC TX: CPT | Mod: CPTII,,, | Performed by: INTERNAL MEDICINE

## 2025-03-24 PROCEDURE — 3288F FALL RISK ASSESSMENT DOCD: CPT | Mod: CPTII,,, | Performed by: INTERNAL MEDICINE

## 2025-03-24 PROCEDURE — 3079F DIAST BP 80-89 MM HG: CPT | Mod: CPTII,,, | Performed by: INTERNAL MEDICINE

## 2025-03-24 PROCEDURE — 73522 X-RAY EXAM HIPS BI 3-4 VIEWS: CPT | Mod: TC

## 2025-03-24 PROCEDURE — 99214 OFFICE O/P EST MOD 30 MIN: CPT | Mod: PBBFAC,25 | Performed by: ORTHOPAEDIC SURGERY

## 2025-03-24 PROCEDURE — 3075F SYST BP GE 130 - 139MM HG: CPT | Mod: CPTII,,, | Performed by: INTERNAL MEDICINE

## 2025-03-24 PROCEDURE — 1159F MED LIST DOCD IN RCRD: CPT | Mod: CPTII,,, | Performed by: ORTHOPAEDIC SURGERY

## 2025-03-24 PROCEDURE — 3008F BODY MASS INDEX DOCD: CPT | Mod: CPTII,,, | Performed by: INTERNAL MEDICINE

## 2025-03-24 PROCEDURE — 3044F HG A1C LEVEL LT 7.0%: CPT | Mod: CPTII,,, | Performed by: ORTHOPAEDIC SURGERY

## 2025-03-24 PROCEDURE — 99215 OFFICE O/P EST HI 40 MIN: CPT | Mod: PBBFAC,25 | Performed by: INTERNAL MEDICINE

## 2025-03-24 PROCEDURE — 99999 PR PBB SHADOW E&M-EST. PATIENT-LVL V: CPT | Mod: PBBFAC,,, | Performed by: INTERNAL MEDICINE

## 2025-03-24 PROCEDURE — 1125F AMNT PAIN NOTED PAIN PRSNT: CPT | Mod: CPTII,,, | Performed by: INTERNAL MEDICINE

## 2025-03-24 PROCEDURE — 99204 OFFICE O/P NEW MOD 45 MIN: CPT | Mod: S$PBB,,, | Performed by: INTERNAL MEDICINE

## 2025-03-24 PROCEDURE — 1159F MED LIST DOCD IN RCRD: CPT | Mod: CPTII,,, | Performed by: INTERNAL MEDICINE

## 2025-03-24 PROCEDURE — 99999 PR PBB SHADOW E&M-EST. PATIENT-LVL IV: CPT | Mod: PBBFAC,,, | Performed by: ORTHOPAEDIC SURGERY

## 2025-03-24 PROCEDURE — 73522 X-RAY EXAM HIPS BI 3-4 VIEWS: CPT | Mod: 26,,, | Performed by: ORTHOPAEDIC SURGERY

## 2025-03-24 PROCEDURE — 4010F ACE/ARB THERAPY RXD/TAKEN: CPT | Mod: CPTII,,, | Performed by: INTERNAL MEDICINE

## 2025-03-24 NOTE — PROGRESS NOTES
[facility] History and Physical  Patient Name: Amina Israel  MRN: [id number]  Age: 70 y.o.  : 1955    Date: 3/24/2025 1:51 PM    Chief Complaint: Establish Care    HPI :   Ms Israel presents to Nephrology clinic to establish care. She is followed by Dr. Obed Durant MD as her primary care provider.     HTN: diagnosed in . Current regimen includes amiloride 5 mg daily, carvedilol 6.25 mg twice daily, valsartan 160 mg daily. Well controlled.     Palpitations: established with Dr. More. Getting echo and stress test.     Thyroid cancer: diagnosed in . S/p thyroidectomy.     DM2: diagnosed in . Current regimen includes glimepiride    Nephrology history: No FH of kidney disease, no nephrolithiasis, or recurrent UTIs. No OTC medications including NSAIDs.     Patient denies any CP, SOB, peripheral edema, dysuria, hematuria, changes in urinary habits, or increased frequency of urination.     Past Medical History:  has a past medical history of Arthritis (), Cancer (2/10/2010), Crohn's disease (), Diabetes mellitus, type 2, GERD (gastroesophageal reflux disease), History of thyroid cancer (), Hyperlipidemia, Hypertension, Hypothyroidism, Immune disorder (), Neuropathy, Obesity, Other and unspecified hyperlipidemia, Spinal stenosis, and Spondylolisthesis.     Past Surgical History:   has a past surgical history that includes Tonsillectomy (); Hysterectomy (); Reduction of both breasts (Bilateral, ); Cervical discectomy (); Foot surgery (Bilateral, ); Polypectomy; Oophorectomy; Total Reduction Mammoplasty; Spine surgery (); Cosmetic surgery (); Breast surgery (); and  section ().     Family History:  family history includes Arthritis in her mother; Asbestos in her brother; Cancer in her brother; Diabetes in her mother; Diabetic kidney disease in her mother; Eczema in her sister; Heart disease  in her mother; Hypertension in her mother; Melanoma in her brother; No Known Problems in her daughter; Stroke in her mother; Tuberculosis in her father. No family history of kidney disease.     Social History:   reports that she has never smoked. She has never used smokeless tobacco. She reports that she does not drink alcohol and does not use drugs.     Allergies: is allergic to metformin and phenergan plain.     Medications: Reviewed including OTC medications, herbal supplements, and NSAIDS.     Old records have been reviewed.      Review of Systems:  ROS: A 10 point ROS was completed and found to be negative except for that mentioned above.          Physical Exam:  Vitals:    03/24/25 1351   BP: 138/80   Pulse: 82   Resp: 18       Constitutional: sitting in chair, in NAD  Eyes: EOMI, white sclera  ENMT: moist mucus membranes, nares patent  Cardiovascular: normal rate, S1/S2 noted, no edema  Respiratory: symmetrical chest expansion, CTA-B  Gastrointestinal: +BS, soft, NT/ND  Musculoskeletal: normal, no joint erythema/effusions  Skin: no rash, no purpura, warm extremities  Neurological: Alert and Oriented x 4, afocal    Labs:   Lab Results   Component Value Date     03/18/2025    K 3.9 03/18/2025    CREATININE 0.72 03/18/2025    ALT 10 03/18/2025    AST 21 03/18/2025    HGBA1C 5.5 02/17/2025    LDLCALC 51 03/12/2024    CHOL 120 03/12/2024    HDL 53 03/12/2024    TRIG 79 03/12/2024    TSH 0.723 03/12/2024    WBC 11.33 (H) 03/18/2025    HGB 14.6 03/18/2025    HCT 44.8 03/18/2025     03/18/2025        Otherwise Reviewed    Assessment/Plan:       Proteinuria: ACR normal. Patient is on RAAS blockade with ARB.     HTN: well controlled with current meds     RTC PRN      Wendy S. Candido, DO  [facility]   03/24/2025

## 2025-03-24 NOTE — PROGRESS NOTES
CLINIC NOTE       Chief Complaint   Patient presents with    Left Hip - Pain    Right Hip - Pain        Amina Israel is a 70 y.o. female seen today for evaluation of bilateral hip pain.  She relates the onset of symptoms to a fall backwards in February of this year when she slipped on a dust pan at home.  Subsequently saw her PCP Dr. Obed verduzco.  Discomfort he has been in the lumbosacral and buttocks region.  She denies groin pain.  She was sent by Dr. Guevara orthopedic consultation.  She is given a shot Dr. Lazo office where she received a shot that essentially alleviated her symptoms.  She was prescribed naproxen for which she feels she developed severe constipation.  She has a history of constipation with multiple medications in the past.      Past Medical History:   Diagnosis Date    Diabetes mellitus, type 2     GERD (gastroesophageal reflux disease)     History of thyroid cancer 2005    Hyperlipidemia     Hypertension     Hypothyroidism     Neuropathy     Spinal stenosis      Family History   Problem Relation Name Age of Onset    Hypertension Mother      Diabetic kidney disease Mother      Tuberculosis Father      Asbestos Brother      Cancer Brother      No Known Problems Daughter       Medications Ordered Prior to Encounter[1]    ROS     There were no vitals filed for this visit.    Past Surgical History:   Procedure Laterality Date    CERVICAL DISCECTOMY  2005    C2-3    FOOT SURGERY Bilateral 2001    HYSTERECTOMY  2003    OOPHORECTOMY      POLYPECTOMY      REDUCTION OF BOTH BREASTS Bilateral 2006    TONSILLECTOMY  1974    TOTAL REDUCTION MAMMOPLASTY          Review of patient's allergies indicates:   Allergen Reactions    Metformin Diarrhea    Phenergan plain Nausea And Vomiting        Ortho Exam : Well-developed well-nourished female no acute distress.  Leg lengths equal.  No groin pain with hip flexion.    Radiographic Examination:  Bilateral hip 03/24/2025    Technique:  Four views AP and lateral  projection both hips    Findings:  The bones well mineralized.  Hip joints are located.  There is minimal joint space narrowing.  No evidence of fracture, dislocation, pathologic bone, significant DJD or AVN.    Impression:   See Above    Assessment and Plan  Patient Active Problem List    Diagnosis Date Noted    Esophageal dysphagia 02/24/2025    Myalgia 10/30/2024    Need for hepatitis C screening test 07/25/2024    Lumbar pain 07/25/2024    Fall 07/25/2024    Polyarthralgia 07/02/2024    Cervical spondylosis without myelopathy 07/02/2024    Dyslipidemia 04/30/2024    Proteinuria 03/27/2024    Hypokalemia 03/27/2024    Encounter for screening colonoscopy 03/14/2024    Other chronic pain 03/14/2024    Rash 03/14/2024    DM (diabetes mellitus) 03/14/2024    Hypertension 03/14/2024    Influenza 01/10/2023    Left shoulder pain 10/19/2021    Impression:  Low back pain resolved.  Minimal hip joint arthritis asymptomatic.  Plan:  Discussed referral or follow up with pain management in the future if back symptoms recur      Fausto Cruz M.D.                   [1]   Current Outpatient Medications on File Prior to Visit   Medication Sig Dispense Refill    aMILoride (MIDAMOR) 5 MG Tab Take 1 tablet (5 mg total) by mouth once daily. 30 tablet 1    carvediloL (COREG) 6.25 MG tablet Take 1 tablet (6.25 mg total) by mouth 2 (two) times daily with meals. 180 tablet 1    docusate sodium (COLACE) 100 MG capsule Take 1 capsule (100 mg total) by mouth 2 (two) times daily. 60 capsule 0    estradioL (ESTRACE) 0.01 % (0.1 mg/gram) vaginal cream Place 1 g vaginally twice a week. 42.5 g 1    estrogens, conjugated, (PREMARIN) 1.25 MG tablet Take 1.25 mg by mouth once daily.      glimepiride (AMARYL) 2 MG tablet Take 1 tablet (2 mg total) by mouth every morning. 90 tablet 1    ketoconazole (NIZORAL) 2 % cream Apply topically.      levothyroxine (SYNTHROID) 100 MCG tablet Take 1 tablet (100 mcg total) by mouth before breakfast. 30  tablet 1    linaCLOtide (LINZESS) 145 mcg Cap capsule Take 1 capsule (145 mcg total) by mouth before breakfast. 90 capsule 3    LUMIGAN 0.01 % Drop Place 1 drop into both eyes every evening.      oxybutynin (DITROPAN) 5 MG Tab Take 1 tablet (5 mg total) by mouth every evening. (Patient not taking: Reported on 3/12/2025) 90 tablet 1    pantoprazole (PROTONIX) 40 MG tablet TAKE ONE TABLET BY MOUTH ONCE DAILY 90 tablet 1    polyethylene glycol (GLYCOLAX) 17 gram PwPk Take 17 g by mouth once daily. 24 each 0    pregabalin (LYRICA) 150 MG capsule Take 1 capsule (150 mg total) by mouth 2 (two) times daily. 180 capsule 1    rosuvastatin (CRESTOR) 10 MG tablet Take 1 tablet (10 mg total) by mouth every evening. 90 tablet 1    simethicone (MYLICON) 125 mg Cap capsule Take 1 capsule (125 mg total) by mouth once daily. (Patient not taking: Reported on 3/12/2025) 3 capsule 0    travoprost (TRAVATAN Z) 0.004 % ophthalmic solution       valsartan (DIOVAN) 160 MG tablet Take 1 tablet (160 mg total) by mouth every evening. 90 tablet 1     No current facility-administered medications on file prior to visit.

## 2025-03-25 ENCOUNTER — HOSPITAL ENCOUNTER (OUTPATIENT)
Dept: CARDIOLOGY | Facility: HOSPITAL | Age: 70
Discharge: HOME | End: 2025-03-25
Attending: INTERNAL MEDICINE
Payer: MEDICARE

## 2025-03-25 DIAGNOSIS — R07.9 CHEST PAIN, UNSPECIFIED TYPE: ICD-10-CM

## 2025-03-25 DIAGNOSIS — E11.9 TYPE 2 DIABETES MELLITUS WITHOUT COMPLICATION, WITHOUT LONG-TERM CURRENT USE OF INSULIN: ICD-10-CM

## 2025-03-25 PROCEDURE — 93246 EXT ECG>7D<15D RECORDING: CPT

## 2025-03-31 ENCOUNTER — OFFICE VISIT (OUTPATIENT)
Dept: FAMILY MEDICINE | Facility: CLINIC | Age: 70
End: 2025-03-31
Payer: MEDICARE

## 2025-03-31 VITALS
DIASTOLIC BLOOD PRESSURE: 83 MMHG | HEIGHT: 67 IN | OXYGEN SATURATION: 97 % | BODY MASS INDEX: 27.31 KG/M2 | TEMPERATURE: 98 F | WEIGHT: 174 LBS | HEART RATE: 75 BPM | SYSTOLIC BLOOD PRESSURE: 160 MMHG | RESPIRATION RATE: 18 BRPM

## 2025-03-31 DIAGNOSIS — Z71.3 DIETARY COUNSELING: ICD-10-CM

## 2025-03-31 DIAGNOSIS — I10 PRIMARY HYPERTENSION: Chronic | ICD-10-CM

## 2025-03-31 DIAGNOSIS — M19.90 OSTEOARTHRITIS, UNSPECIFIED OSTEOARTHRITIS TYPE, UNSPECIFIED SITE: Primary | Chronic | ICD-10-CM

## 2025-03-31 PROCEDURE — 3008F BODY MASS INDEX DOCD: CPT | Mod: ,,, | Performed by: FAMILY MEDICINE

## 2025-03-31 PROCEDURE — 1101F PT FALLS ASSESS-DOCD LE1/YR: CPT | Mod: ,,, | Performed by: FAMILY MEDICINE

## 2025-03-31 PROCEDURE — 1125F AMNT PAIN NOTED PAIN PRSNT: CPT | Mod: ,,, | Performed by: FAMILY MEDICINE

## 2025-03-31 PROCEDURE — 3077F SYST BP >= 140 MM HG: CPT | Mod: ,,, | Performed by: FAMILY MEDICINE

## 2025-03-31 PROCEDURE — 1159F MED LIST DOCD IN RCRD: CPT | Mod: ,,, | Performed by: FAMILY MEDICINE

## 2025-03-31 PROCEDURE — 4010F ACE/ARB THERAPY RXD/TAKEN: CPT | Mod: ,,, | Performed by: FAMILY MEDICINE

## 2025-03-31 PROCEDURE — 3066F NEPHROPATHY DOC TX: CPT | Mod: ,,, | Performed by: FAMILY MEDICINE

## 2025-03-31 PROCEDURE — 3288F FALL RISK ASSESSMENT DOCD: CPT | Mod: ,,, | Performed by: FAMILY MEDICINE

## 2025-03-31 PROCEDURE — 3044F HG A1C LEVEL LT 7.0%: CPT | Mod: ,,, | Performed by: FAMILY MEDICINE

## 2025-03-31 PROCEDURE — 3079F DIAST BP 80-89 MM HG: CPT | Mod: ,,, | Performed by: FAMILY MEDICINE

## 2025-03-31 PROCEDURE — 1160F RVW MEDS BY RX/DR IN RCRD: CPT | Mod: ,,, | Performed by: FAMILY MEDICINE

## 2025-03-31 PROCEDURE — 99213 OFFICE O/P EST LOW 20 MIN: CPT | Mod: GE,,, | Performed by: FAMILY MEDICINE

## 2025-03-31 RX ORDER — CARVEDILOL 6.25 MG/1
6.25 TABLET ORAL 2 TIMES DAILY WITH MEALS
Qty: 60 TABLET | Refills: 3 | Status: SHIPPED | OUTPATIENT
Start: 2025-03-31

## 2025-03-31 RX ORDER — MELOXICAM 7.5 MG/1
7.5 TABLET ORAL DAILY
Qty: 30 TABLET | Refills: 0 | Status: SHIPPED | OUTPATIENT
Start: 2025-03-31 | End: 2025-04-30

## 2025-03-31 NOTE — ASSESSMENT & PLAN NOTE
The patient was recently admitted to the ER for a fall, which she attributes to her chronic back pain. During the visit, the patient reported exacerbation of her chronic back pain. The patient saw Dr. Durant on March 17th. She had her imaging done, which did not show any fractures or any acute issues. Previous lumbar xray showed senile/osteoarthritic changes.  Patient is reluctant to use pain clinic because of constipation. Detailed discussion about Meloxicam was done and was started with a shared discussion.

## 2025-03-31 NOTE — ASSESSMENT & PLAN NOTE
Lifestyle Nutrition   The American College of Lifestyle Medicine recommends an eating plan based largely on a variety of minimally processed vegetables, fruits, whole grains, legumes, nuts and seeds. Eating whole plant foods is a great way to get in more nutrition with less harm and is one of the best ways to prevent, treat and even reverse many chronic diseases.     Eat Plenty    Vegetables: Leafy vegetables (kale, spinach, haleigh, swiss chard, raheem greens, cabbage), garlic, onions, peppers (all kinds), leeks, parsnips, potatoes (all kinds), radishes, turnips, squash, green beans, tomatoes, carrots, corn, peas, cauliflower, broccoli, cucumbers, eggplant, mushrooms.   Fruits: Bananas, apples, kiwi, oranges, blackberries, strawberries, raspberries, blueberries, christine, cantaloupe, watermelon, honeydew, plums, pineapple.   Legumes: Black beans, kidney beans, dodson beans, garbanzo beans, cannellini beans, lentils, lima beans, broad beans, soybeans.   Whole Grains: Quinoa, brown rice, oats, barley, wild rice, black rice, whole grain tortillas/pasta/breads, couscous, teff, wheat germ.   Nuts: lmonds, peanuts, pistachios, cashews, brazil nuts, soy nuts, hazelnuts, walnuts.   Seeds: Scott seed, flax seed, hemp seed, pumpkin seed, sunflower seed.    Limit/Avoid    Sugary drinks like soda, juice cocktails, coffee and energy drinks    Processed meats like sausage, bhatia, salami, bologna, deli meat    Processed snacks like crackers, chips, pretzels    Cakes, pastries, sweets    Dairy (especially high-fat types with added salt and sugar)    Red meats    Poultry    Eggs     Nutrition Goals  Setting goals to improve your eating habits is a great way to eat healthier. An example of a positive nutrition goal is, I will add 1 cup of berries to breakfast and a small apple or orange as an afternoon snack at least five days this week.

## 2025-03-31 NOTE — PROGRESS NOTES
"  Vickie Buckley MD MPH  [address], [place] [address]  Phone: [phone]     Subjective     Name: Amina Israel   YOB: 1955 (70 y.o.)  MRN: [id number]  Visit Date: 3/31/2025   Chief Complaint: Establish Care (States that this has been going on since Feb. Has a fall the second week, reports having pain from the fall. Both hips and her right knee. Reports having a problem with some pain medications, causes her GI discomfort. States that some pain medications "lock" her GI system up causing constipation. Rates her pain as a 4/10. Has been an ongoing problem since Feb. 18/25.) and Health Maintenance (Has recently sent her heart monitor back. Lowest beat was 47 beats. )        HISTORY OF PRESENT ILLNESS:  Patient is a 70-year-old female who presented to the clinic to establish care. The patient has a past medical history of type 2 diabetes, hypertension, dyslipidemia, and chronic back pain. The patient previously saw Dr. Durant for her chronic problems.  The patient was recently admitted to the ER for a fall, which she attributes to her chronic back pain. During the visit, the patient reported exacerbation of her chronic back pain. The patient saw Dr. Durant on March 17th. She had her imaging done, which did not show any fractures or any acute issues. Previous lumbar xray showed senile/osteoarthritic changes.  Patient is reluctant to use pain clinic because of constipation. Detailed discussion about Meloxicam was done and was started with a shared discussion.    During the encounter, her BP was elevated. Second reading ?(Manual was elevated as well). She has not taken her morning medications before coming to the clinic. Patient follows up with Dr More for cardiac issues. She has ECHO and Stress test scheduled for 4/3/2025. She was given a BP log. Patient will be called next week to follow up for her BP. Patient denied headaches, vision changes, or dizziness during encounter. Patient was advised to " call or go to ER if such symptoms occur.   Patient was advised to follow u once she has seen Dr More and also to take her BP log to her Cardiology appointment.     Patient was not in any acute distress during the encounter. Patient is compliant with the daily medications and doesn't experience any problems with the current medication regimen.  Patient denied any SOB, wheezing, chills, cough, fevers, palpitations, leg swelling, chest pain, gastrointestinal symptoms, genitourinary symptoms, or myalgias.            PAST MEDICAL HISTORY:  Significant Diagnoses - Patient  has a past medical history of Arthritis (), Cancer (2/10/2010), Crohn's disease (), Diabetes mellitus, type 2, GERD (gastroesophageal reflux disease), History of thyroid cancer (), Hyperlipidemia, Hypertension, Hypothyroidism, Immune disorder (), Neuropathy, Obesity, Other and unspecified hyperlipidemia, Spinal stenosis, and Spondylolisthesis.  Medications - Patient has a current medication list which includes the following long-term medication(s): amiloride, carvedilol, estradiol, estrogens (conjugated), glimepiride, levothyroxine, lumigan, pantoprazole, pregabalin, rosuvastatin, and valsartan.   Allergies - Patient is allergic to metformin and phenergan plain.  Surgeries - Patient  has a past surgical history that includes Tonsillectomy (); Hysterectomy (); Reduction of both breasts (Bilateral, ); Cervical discectomy (); Foot surgery (Bilateral, ); Polypectomy; Oophorectomy; Total Reduction Mammoplasty; Spine surgery (); Cosmetic surgery (); Breast surgery (); and  section ().  Family History - Patient family history includes Arthritis in her mother; Asbestos in her brother; Cancer in her brother; Diabetes in her mother; Diabetic kidney disease in her mother; Eczema in her sister; Heart disease in her mother; Hypertension in her mother; Melanoma in her brother; No Known Problems in her  daughter; Stroke in her mother; Tuberculosis in her father.      SOCIAL HISTORY:  Tobacco - Patient  reports that she has never smoked. She has never used smokeless tobacco.   Alcohol - Patient  reports no history of alcohol use.   Recreational Drugs - Patient  reports no history of drug use.       Review of Systems   Constitutional:  Negative for activity change, appetite change, chills, fatigue and fever.   HENT:  Negative for nasal congestion, ear discharge, ear pain, hearing loss, postnasal drip, rhinorrhea, sinus pressure/congestion and sore throat.    Eyes:  Negative for discharge, itching and visual disturbance.   Respiratory:  Negative for cough, choking, chest tightness, shortness of breath and wheezing.    Cardiovascular:  Negative for chest pain, palpitations, leg swelling and claudication.   Gastrointestinal:  Positive for constipation. Negative for abdominal distention, abdominal pain, diarrhea, nausea, vomiting and reflux.   Genitourinary:  Negative for difficulty urinating, dysuria, frequency, hematuria and urgency.   Musculoskeletal:  Positive for back pain. Negative for arthralgias, joint swelling and myalgias.   Integumentary:  Negative for rash.   Neurological:  Negative for tremors, light-headedness, numbness and headaches.          Past Medical History:   Diagnosis Date    Arthritis 2013    Cancer 2/10/2010    Pipulary throid cancer    Crohn's disease 2012    Diabetes mellitus, type 2     GERD (gastroesophageal reflux disease)     History of thyroid cancer 2005    Hyperlipidemia     Hypertension     Hypothyroidism     Immune disorder 2010    Neuropathy     Obesity     Other and unspecified hyperlipidemia     Spinal stenosis     Spondylolisthesis         Review of patient's allergies indicates:   Allergen Reactions    Metformin Diarrhea    Phenergan plain Nausea And Vomiting        Past Surgical History:   Procedure Laterality Date    BREAST SURGERY  2005/2006    Disecctomy/breast reduction     "CERVICAL DISCECTOMY      C2-3     SECTION  2025    COSMETIC SURGERY  2006    Breast reduction    FOOT SURGERY Bilateral 2001    HYSTERECTOMY  2003    OOPHORECTOMY      POLYPECTOMY      REDUCTION OF BOTH BREASTS Bilateral 2006    SPINE SURGERY  2005    Disectomy with bone fusion    TONSILLECTOMY  1974    TOTAL REDUCTION MAMMOPLASTY          Family History   Problem Relation Name Age of Onset    Hypertension Mother Marcia Culver     Diabetic kidney disease Mother Marcia Culver     Arthritis Mother Marcia Culver     Diabetes Mother Marcia Culver     Heart disease Mother Marcia Culver     Stroke Mother Marcia Culver     Tuberculosis Father      Asbestos Brother      Cancer Brother Manuel culver Jr     Melanoma Brother Manuel culver Jr     No Known Problems Daughter      Eczema Sister Pat Mera        Current Outpatient Medications   Medication Instructions    aMILoride (MIDAMOR) 5 mg, Oral, Daily    carvediloL (COREG) 6.25 mg, Oral, 2 times daily with meals    docusate sodium (COLACE) 100 mg, Oral, 2 times daily    estradioL (ESTRACE) 1 g, Vaginal, Twice weekly    estrogens (conjugated) (PREMARIN) 1.25 mg, Daily    glimepiride (AMARYL) 2 mg, Oral, Every morning    levothyroxine (SYNTHROID) 100 mcg, Oral, Before breakfast    linaCLOtide (LINZESS) 145 mcg, Oral, Before breakfast    LUMIGAN 0.01 % Drop 1 drop, Nightly    meloxicam (MOBIC) 7.5 mg, Oral, Daily    pantoprazole (PROTONIX) 40 mg, Oral    polyethylene glycol (GLYCOLAX) 17 g, Oral, Daily    pregabalin (LYRICA) 150 mg, Oral, 2 times daily    rosuvastatin (CRESTOR) 10 mg, Oral, Nightly    valsartan (DIOVAN) 160 mg, Oral, Nightly        Objective     BP (!) 160/83 (BP Location: Left arm, Patient Position: Sitting)   Pulse 75   Temp 98.2 °F (36.8 °C) (Oral)   Resp 18   Ht 5' 7" (1.702 m)   Wt 78.9 kg (174 lb)   SpO2 97%   BMI 27.25 kg/m²     Physical Exam  Vitals and nursing note reviewed.   Constitutional:       " Appearance: Normal appearance. She is normal weight.   HENT:      Head: Normocephalic and atraumatic.      Right Ear: Tympanic membrane and ear canal normal.      Left Ear: Tympanic membrane and ear canal normal.      Nose: Nose normal.      Mouth/Throat:      Mouth: Mucous membranes are moist.      Pharynx: Oropharynx is clear.   Eyes:      Extraocular Movements: Extraocular movements intact.      Conjunctiva/sclera: Conjunctivae normal.      Pupils: Pupils are equal, round, and reactive to light.   Cardiovascular:      Rate and Rhythm: Normal rate and regular rhythm.      Pulses: Normal pulses.      Heart sounds: Normal heart sounds. No murmur heard.  Pulmonary:      Effort: Pulmonary effort is normal. No respiratory distress.      Breath sounds: Normal breath sounds. No wheezing.   Abdominal:      General: Bowel sounds are normal. There is no distension.      Palpations: Abdomen is soft.      Tenderness: There is no abdominal tenderness. There is no guarding.   Musculoskeletal:      Cervical back: Normal range of motion and neck supple.   Skin:     Capillary Refill: Capillary refill takes less than 2 seconds.   Neurological:      General: No focal deficit present.      Mental Status: She is alert and oriented to person, place, and time. Mental status is at baseline.   Psychiatric:         Mood and Affect: Mood normal.         Behavior: Behavior normal.         Thought Content: Thought content normal.         Judgment: Judgment normal.          All recently obtained labs have been reviewed and discussed in detail with the patient.   Assessment     1. Osteoarthritis, unspecified osteoarthritis type, unspecified site    2. Primary hypertension    3. Dietary counseling         Plan     Problem List Items Addressed This Visit       Dietary counseling    Lifestyle Nutrition   The American College of Lifestyle Medicine recommends an eating plan based largely on a variety of minimally processed vegetables, fruits, whole  grains, legumes, nuts and seeds. Eating whole plant foods is a great way to get in more nutrition with less harm and is one of the best ways to prevent, treat and even reverse many chronic diseases.     Eat Plenty    Vegetables: Leafy vegetables (kale, spinach, haleigh, swiss chard, raheem greens, cabbage), garlic, onions, peppers (all kinds), leeks, parsnips, potatoes (all kinds), radishes, turnips, squash, green beans, tomatoes, carrots, corn, peas, cauliflower, broccoli, cucumbers, eggplant, mushrooms.   Fruits: Bananas, apples, kiwi, oranges, blackberries, strawberries, raspberries, blueberries, christine, cantaloupe, watermelon, honeydew, plums, pineapple.   Legumes: Black beans, kidney beans, dodson beans, garbanzo beans, cannellini beans, lentils, lima beans, broad beans, soybeans.   Whole Grains: Quinoa, brown rice, oats, barley, wild rice, black rice, whole grain tortillas/pasta/breads, couscous, teff, wheat germ.   Nuts: lmonds, peanuts, pistachios, cashews, brazil nuts, soy nuts, hazelnuts, walnuts.   Seeds: Scott seed, flax seed, hemp seed, pumpkin seed, sunflower seed.    Limit/Avoid    Sugary drinks like soda, juice cocktails, coffee and energy drinks    Processed meats like sausage, bhatia, salami, bologna, deli meat    Processed snacks like crackers, chips, pretzels    Cakes, pastries, sweets    Dairy (especially high-fat types with added salt and sugar)    Red meats    Poultry    Eggs     Nutrition Goals  Setting goals to improve your eating habits is a great way to eat healthier. An example of a positive nutrition goal is, I will add 1 cup of berries to breakfast and a small apple or orange as an afternoon snack at least five days this week.           Hypertension    - monitor BP at home  - watch out for symptoms as headaches, dizziness, chest pain, palpitations, lightheadedness, or blurred vision  - manage a BP log and bring it to your next appointment  - maintain healthy life style with dietary  restriction such as low salt and low sodium diet. Follow DASH diet.   - engage in physical exercise for at least 30 minutes a day/5 days a week  - The ASCVD Risk score (Ericson DK, et al., 2019) failed to calculate for the following reasons:    The valid total cholesterol range is 130 to 320 mg/dL  - take medications regularly  - During the encounter, her BP was elevated. Second reading ?(Manual was elevated as well). She has not taken her morning medications before coming to the clinic. Patient follows up with Dr More for cardiac issues. She has ECHO and Stress test scheduled for 4/3/2025. She was given a BP log. Patient will be called next week to follow up for her BP. Patient denied headaches, vision changes, or dizziness during encounter. Patient was advised to call or go to ER if such symptoms occur.   Patient was advised to follow u once she has seen Dr More and also to take her BP log to her Cardiology appointment.            Osteoarthritis - Primary    The patient was recently admitted to the ER for a fall, which she attributes to her chronic back pain. During the visit, the patient reported exacerbation of her chronic back pain. The patient saw Dr. Durant on March 17th. She had her imaging done, which did not show any fractures or any acute issues. Previous lumbar xray showed senile/osteoarthritic changes.  Patient is reluctant to use pain clinic because of constipation. Detailed discussion about Meloxicam was done and was started with a shared discussion.           Relevant Medications    meloxicam (MOBIC) 7.5 MG tablet         All orders:  Orders Placed This Encounter    meloxicam (MOBIC) 7.5 MG tablet          No follow-ups on file.    Vickie Buckley MD MPH

## 2025-03-31 NOTE — ASSESSMENT & PLAN NOTE
- monitor BP at home  - watch out for symptoms as headaches, dizziness, chest pain, palpitations, lightheadedness, or blurred vision  - manage a BP log and bring it to your next appointment  - maintain healthy life style with dietary restriction such as low salt and low sodium diet. Follow DASH diet.   - engage in physical exercise for at least 30 minutes a day/5 days a week  - The ASCVD Risk score (Lety TURNER, et al., 2019) failed to calculate for the following reasons:    The valid total cholesterol range is 130 to 320 mg/dL  - take medications regularly  - During the encounter, her BP was elevated. Second reading ?(Manual was elevated as well). She has not taken her morning medications before coming to the clinic. Patient follows up with Dr More for cardiac issues. She has ECHO and Stress test scheduled for 4/3/2025. She was given a BP log. Patient will be called next week to follow up for her BP. Patient denied headaches, vision changes, or dizziness during encounter. Patient was advised to call or go to ER if such symptoms occur.   Patient was advised to follow u once she has seen Dr More and also to take her BP log to her Cardiology appointment.

## 2025-04-01 ENCOUNTER — HOSPITAL ENCOUNTER (OUTPATIENT)
Dept: RADIOLOGY | Facility: HOSPITAL | Age: 70
Discharge: HOME | End: 2025-04-01
Payer: MEDICARE

## 2025-04-01 DIAGNOSIS — R11.2 NAUSEA AND VOMITING, UNSPECIFIED VOMITING TYPE: ICD-10-CM

## 2025-04-01 DIAGNOSIS — R10.10 UPPER ABDOMINAL PAIN, UNSPECIFIED: ICD-10-CM

## 2025-04-01 PROCEDURE — 78227 HEPATOBIL SYST IMAGE W/DRUG: CPT | Mod: 26,,, | Performed by: RADIOLOGY

## 2025-04-01 PROCEDURE — A9537 TC99M MEBROFENIN: HCPCS

## 2025-04-01 PROCEDURE — 78227 HEPATOBIL SYST IMAGE W/DRUG: CPT | Mod: TC

## 2025-04-01 RX ORDER — KIT FOR THE PREPARATION OF TECHNETIUM TC 99M MEBROFENIN 45 MG/10ML
5 INJECTION, POWDER, LYOPHILIZED, FOR SOLUTION INTRAVENOUS
Status: COMPLETED | OUTPATIENT
Start: 2025-04-01 | End: 2025-04-01

## 2025-04-01 RX ADMIN — MEBROFENIN 5 MILLICURIE: 45 INJECTION, POWDER, LYOPHILIZED, FOR SOLUTION INTRAVENOUS at 10:04

## 2025-04-02 ENCOUNTER — TELEPHONE (OUTPATIENT)
Dept: CARDIOLOGY | Facility: HOSPITAL | Age: 70
End: 2025-04-02
Payer: MEDICARE

## 2025-04-02 ENCOUNTER — RESULTS FOLLOW-UP (OUTPATIENT)
Dept: GASTROENTEROLOGY | Facility: CLINIC | Age: 70
End: 2025-04-02

## 2025-04-02 ENCOUNTER — TELEPHONE (OUTPATIENT)
Dept: GASTROENTEROLOGY | Facility: CLINIC | Age: 70
End: 2025-04-02
Payer: MEDICARE

## 2025-04-02 DIAGNOSIS — K82.8 DYSKINESIA OF GALLBLADDER: Primary | ICD-10-CM

## 2025-04-02 NOTE — TELEPHONE ENCOUNTER
----- Message from ROSIE Ohara sent at 4/2/2025  3:45 PM CDT -----  Gallbladder ejection fraction is decreased and HIDA is concerning for chronic cholecystitis.  I am placing a referral to General surgery.  ----- Message -----  From: Interface, Rad Results In  Sent: 4/1/2025   3:34 PM CDT  To: ROSIE Ohara

## 2025-04-03 ENCOUNTER — HOSPITAL ENCOUNTER (OUTPATIENT)
Dept: CARDIOLOGY | Facility: HOSPITAL | Age: 70
Discharge: HOME | End: 2025-04-03
Attending: INTERNAL MEDICINE
Payer: MEDICARE

## 2025-04-03 ENCOUNTER — HOSPITAL ENCOUNTER (OUTPATIENT)
Dept: RADIOLOGY | Facility: HOSPITAL | Age: 70
Discharge: HOME | End: 2025-04-03
Attending: INTERNAL MEDICINE
Payer: MEDICARE

## 2025-04-03 DIAGNOSIS — R00.2 PALPITATIONS: ICD-10-CM

## 2025-04-03 DIAGNOSIS — I20.81 ANGINA PECTORIS WITH CORONARY MICROVASCULAR DYSFUNCTION: ICD-10-CM

## 2025-04-03 DIAGNOSIS — I20.89 OTHER FORMS OF ANGINA PECTORIS: ICD-10-CM

## 2025-04-03 PROCEDURE — A9500 TC99M SESTAMIBI: HCPCS | Performed by: INTERNAL MEDICINE

## 2025-04-03 PROCEDURE — 93306 TTE W/DOPPLER COMPLETE: CPT

## 2025-04-03 PROCEDURE — 63600175 PHARM REV CODE 636 W HCPCS: Performed by: INTERNAL MEDICINE

## 2025-04-03 PROCEDURE — 78452 HT MUSCLE IMAGE SPECT MULT: CPT | Mod: TC

## 2025-04-03 PROCEDURE — 93017 CV STRESS TEST TRACING ONLY: CPT

## 2025-04-03 PROCEDURE — 93306 TTE W/DOPPLER COMPLETE: CPT | Mod: 26,,, | Performed by: STUDENT IN AN ORGANIZED HEALTH CARE EDUCATION/TRAINING PROGRAM

## 2025-04-03 RX ORDER — TETRAKIS(2-METHOXYISOBUTYLISOCYANIDE)COPPER(I) TETRAFLUOROBORATE 1 MG/ML
11 INJECTION, POWDER, LYOPHILIZED, FOR SOLUTION INTRAVENOUS
Status: COMPLETED | OUTPATIENT
Start: 2025-04-03 | End: 2025-04-03

## 2025-04-03 RX ORDER — TETRAKIS(2-METHOXYISOBUTYLISOCYANIDE)COPPER(I) TETRAFLUOROBORATE 1 MG/ML
35 INJECTION, POWDER, LYOPHILIZED, FOR SOLUTION INTRAVENOUS
Status: COMPLETED | OUTPATIENT
Start: 2025-04-03 | End: 2025-04-03

## 2025-04-03 RX ORDER — REGADENOSON 0.08 MG/ML
0.4 INJECTION, SOLUTION INTRAVENOUS ONCE
Status: COMPLETED | OUTPATIENT
Start: 2025-04-03 | End: 2025-04-03

## 2025-04-03 RX ADMIN — KIT FOR THE PREPARATION OF TECHNETIUM TC99M SESTAMIBI 11 MILLICURIE: 1 INJECTION, POWDER, LYOPHILIZED, FOR SOLUTION PARENTERAL at 08:04

## 2025-04-03 RX ADMIN — KIT FOR THE PREPARATION OF TECHNETIUM TC99M SESTAMIBI 35 MILLICURIE: 1 INJECTION, POWDER, LYOPHILIZED, FOR SOLUTION PARENTERAL at 10:04

## 2025-04-03 RX ADMIN — REGADENOSON 0.4 MG: 0.08 INJECTION, SOLUTION INTRAVENOUS at 10:04

## 2025-04-04 LAB
CV STRESS BASE HR: 62 BPM
DIASTOLIC BLOOD PRESSURE: 102 MMHG
OHS CV CPX 1 MINUTE RECOVERY HEART RATE: 103 BPM
OHS CV CPX 85 PERCENT MAX PREDICTED HEART RATE MALE: 129
OHS CV CPX MAX PREDICTED HEART RATE: 152
OHS CV CPX PATIENT IS FEMALE: 1
OHS CV CPX PATIENT IS MALE: 0
OHS CV CPX PEAK DIASTOLIC BLOOD PRESSURE: 95 MMHG
OHS CV CPX PEAK HEAR RATE: 111 BPM
OHS CV CPX PEAK RATE PRESSURE PRODUCT: NORMAL
OHS CV CPX PEAK SYSTOLIC BLOOD PRESSURE: 178 MMHG
OHS CV CPX PERCENT MAX PREDICTED HEART RATE ACHIEVED: 76
OHS CV CPX RATE PRESSURE PRODUCT PRESENTING: NORMAL
STRESS ECHO POST EXERCISE DUR MIN: 1 MINUTES
STRESS ECHO POST EXERCISE DUR SEC: 28 SECONDS
SYSTOLIC BLOOD PRESSURE: 176 MMHG

## 2025-04-05 LAB
AORTIC ROOT ANNULUS: 2.43 CM
AORTIC VALVE CUSP SEPERATION: 1.39 CM
AV INDEX (PROSTH): 0.75
AV MEAN GRADIENT: 4 MMHG
AV PEAK GRADIENT: 9 MMHG
AV VALVE AREA BY VELOCITY RATIO: 2.3 CM²
AV VALVE AREA: 2.3 CM²
AV VELOCITY RATIO: 0.73
CV ECHO LV RWT: 0.51 CM
DOP CALC AO PEAK VEL: 1.5 M/S
DOP CALC AO VTI: 33.9 CM
DOP CALC LVOT AREA: 3.1 CM2
DOP CALC LVOT DIAMETER: 2 CM
DOP CALC LVOT PEAK VEL: 1.1 M/S
DOP CALC LVOT STROKE VOLUME: 79.4 CM3
DOP CALCLVOT PEAK VEL VTI: 25.3 CM
E WAVE DECELERATION TIME: 223 MSEC
E/A RATIO: 0.85
E/E' RATIO: 11 M/S
ECHO LV POSTERIOR WALL: 1 CM (ref 0.6–1.1)
FRACTIONAL SHORTENING: 17.9 % (ref 28–44)
INTERVENTRICULAR SEPTUM: 1.1 CM (ref 0.6–1.1)
IVC DIAMETER: 1.44 CM
LEFT ATRIUM AREA SYSTOLIC (APICAL 2 CHAMBER): 19.08 CM2
LEFT ATRIUM AREA SYSTOLIC (APICAL 4 CHAMBER): 21.52 CM2
LEFT ATRIUM VOLUME MOD: 59 ML
LEFT INTERNAL DIMENSION IN SYSTOLE: 3.2 CM (ref 2.1–4)
LEFT VENTRICLE DIASTOLIC VOLUME: 71 ML
LEFT VENTRICLE END SYSTOLIC VOLUME APICAL 2 CHAMBER: 48.07 ML
LEFT VENTRICLE END SYSTOLIC VOLUME APICAL 4 CHAMBER: 68.19 ML
LEFT VENTRICLE SYSTOLIC VOLUME: 40 ML
LEFT VENTRICULAR INTERNAL DIMENSION IN DIASTOLE: 3.9 CM (ref 3.5–6)
LEFT VENTRICULAR MASS: 131 G
LV LATERAL E/E' RATIO: 10.1 M/S
LV SEPTAL E/E' RATIO: 11.8 M/S
LVED V (TEICH): 71.31 ML
LVES V (TEICH): 39.99 ML
LVOT MG: 2.29 MMHG
LVOT MV: 0.71 CM/S
MV PEAK A VEL: 0.84 M/S
MV PEAK E VEL: 0.71 M/S
MV STENOSIS PRESSURE HALF TIME: 64.79 MS
MV VALVE AREA P 1/2 METHOD: 3.4 CM2
OHS CV RV/LV RATIO: 0.56 CM
PISA MRMAX VEL: 1.23 M/S
PISA TR MAX VEL: 1.5 M/S
PV PEAK GRADIENT: 2 MMHG
PV PEAK VELOCITY: 0.74 M/S
RA PRESSURE ESTIMATED: 3 MMHG
RA VOL SYS: 20.61 ML
RIGHT ATRIAL AREA: 11.3 CM2
RIGHT ATRIUM VOLUME AREA LENGTH APICAL 4 CHAMBER: 20.08 ML
RIGHT VENTRICLE DIASTOLIC BASEL DIMENSION: 2.2 CM
RIGHT VENTRICLE DIASTOLIC LENGTH: 5.8 CM
RIGHT VENTRICLE DIASTOLIC MID DIMENSION: 1.2 CM
RIGHT VENTRICULAR LENGTH IN DIASTOLE (APICAL 4-CHAMBER VIEW): 5.79 CM
RV MID DIAMA: 1.21 CM
RV TB RVSP: 5 MMHG
TDI LATERAL: 0.07 M/S
TDI SEPTAL: 0.06 M/S
TDI: 0.07 M/S
TR MAX PG: 8 MMHG
TRICUSPID ANNULAR PLANE SYSTOLIC EXCURSION: 2.14 CM
TV REST PULMONARY ARTERY PRESSURE: 12 MMHG

## 2025-04-14 ENCOUNTER — OFFICE VISIT (OUTPATIENT)
Dept: SURGERY | Facility: CLINIC | Age: 70
End: 2025-04-14
Attending: SURGERY
Payer: MEDICARE

## 2025-04-14 VITALS — HEIGHT: 67 IN | BODY MASS INDEX: 27.15 KG/M2 | WEIGHT: 173 LBS

## 2025-04-14 DIAGNOSIS — Z01.818 PRE-PROCEDURAL EXAMINATION: ICD-10-CM

## 2025-04-14 DIAGNOSIS — K82.8 DYSKINESIA OF GALLBLADDER: Primary | ICD-10-CM

## 2025-04-14 PROCEDURE — 1101F PT FALLS ASSESS-DOCD LE1/YR: CPT | Mod: CPTII,,, | Performed by: SURGERY

## 2025-04-14 PROCEDURE — 1159F MED LIST DOCD IN RCRD: CPT | Mod: CPTII,,, | Performed by: SURGERY

## 2025-04-14 PROCEDURE — 99999 PR PBB SHADOW E&M-EST. PATIENT-LVL V: CPT | Mod: PBBFAC,,, | Performed by: SURGERY

## 2025-04-14 PROCEDURE — 3044F HG A1C LEVEL LT 7.0%: CPT | Mod: CPTII,,, | Performed by: SURGERY

## 2025-04-14 PROCEDURE — 3288F FALL RISK ASSESSMENT DOCD: CPT | Mod: CPTII,,, | Performed by: SURGERY

## 2025-04-14 PROCEDURE — 99215 OFFICE O/P EST HI 40 MIN: CPT | Mod: PBBFAC | Performed by: SURGERY

## 2025-04-14 PROCEDURE — 3066F NEPHROPATHY DOC TX: CPT | Mod: CPTII,,, | Performed by: SURGERY

## 2025-04-14 PROCEDURE — 4010F ACE/ARB THERAPY RXD/TAKEN: CPT | Mod: CPTII,,, | Performed by: SURGERY

## 2025-04-14 PROCEDURE — 3008F BODY MASS INDEX DOCD: CPT | Mod: CPTII,,, | Performed by: SURGERY

## 2025-04-14 PROCEDURE — 99204 OFFICE O/P NEW MOD 45 MIN: CPT | Mod: S$PBB,,, | Performed by: SURGERY

## 2025-04-14 RX ORDER — SODIUM CHLORIDE 9 MG/ML
INJECTION, SOLUTION INTRAVENOUS CONTINUOUS
OUTPATIENT
Start: 2025-04-14

## 2025-04-14 RX ORDER — CEFAZOLIN SODIUM 2 G/50ML
2 SOLUTION INTRAVENOUS
OUTPATIENT
Start: 2025-04-14

## 2025-04-14 NOTE — H&P (VIEW-ONLY)
General Surgery History and Physical      Patient ID: Amina Israel is a 70 y.o. female.    Chief Complaint: Gall Bladder Problem      HPI:  70-year-old female about 2 month history of right upper quadrant pain associated with eating with some nausea and occasional emesis.  She has been to the ER twice for this.  Over the last year she has been fighting for for some COVID infections and some pain from a fall as well as some constipation but all this is remote compared to her recent issues.  She has gone for a HIDA scan which showed a decreased ejection fraction and reproducible postprandial pain during the scan.  No known family history of gallbladder disease.  No major abdominal operations before besides some C-sections.  No blood thinners and she is still having the discomfort and issues overall.    Review of Systems   Constitutional:  Negative for activity change, appetite change, fatigue and fever.   HENT:  Negative for trouble swallowing.    Respiratory:  Negative for cough and shortness of breath.    Cardiovascular:  Negative for chest pain and palpitations.   Gastrointestinal:  Positive for abdominal pain, nausea and vomiting. Negative for abdominal distention, blood in stool, constipation, diarrhea and rectal pain.   Genitourinary:  Negative for flank pain.   Musculoskeletal:  Negative for neck pain and neck stiffness.   Neurological:  Negative for weakness.       Current Medications[1]    Review of patient's allergies indicates:   Allergen Reactions    Metformin Diarrhea    Phenergan plain Nausea And Vomiting       Past Medical History:   Diagnosis Date    Arthritis 2013    Cancer 2/10/2010    Pipulary throid cancer    Crohn's disease 2012    Diabetes mellitus, type 2     GERD (gastroesophageal reflux disease)     History of thyroid cancer 2005    Hyperlipidemia     Hypertension     Hypothyroidism     Immune  disorder 2010    Neuropathy     Obesity     Other and unspecified hyperlipidemia     Spinal stenosis     Spondylolisthesis        Past Surgical History:   Procedure Laterality Date    BREAST SURGERY  /    Disecctomy/breast reduction    CERVICAL DISCECTOMY  2005    C2-3     SECTION  2025    COSMETIC SURGERY  2006    Breast reduction    FOOT SURGERY Bilateral 2001    HYSTERECTOMY  2003    OOPHORECTOMY      POLYPECTOMY      REDUCTION OF BOTH BREASTS Bilateral 2006    SPINE SURGERY      Disectomy with bone fusion    TONSILLECTOMY  1974    TOTAL REDUCTION MAMMOPLASTY         Family History   Problem Relation Name Age of Onset    Hypertension Mother Clotgurmeet Prasader     Diabetic kidney disease Mother Clotgurmeet Pinto     Arthritis Mother Clotill Pinto     Diabetes Mother Clotill Pinto     Heart disease Mother Clotill Pinto     Stroke Mother Clotgurmeet Pinto     Tuberculosis Father      Asbestos Brother      Cancer Brother Manuel pinto Jr     Melanoma Brother Manuel pinto Jr     No Known Problems Daughter      Eczema Sister Pat Mera        Social History[2]    There were no vitals filed for this visit.    Physical Exam  Constitutional:       General: She is not in acute distress.  HENT:      Head: Normocephalic.   Cardiovascular:      Rate and Rhythm: Normal rate and regular rhythm.      Pulses: Normal pulses.   Pulmonary:      Effort: Pulmonary effort is normal. No respiratory distress.      Breath sounds: Normal breath sounds.   Abdominal:      General: Abdomen is flat. There is no distension.      Palpations: Abdomen is soft.      Tenderness: There is no abdominal tenderness.   Musculoskeletal:         General: Normal range of motion.   Skin:     General: Skin is warm.   Neurological:      General: No focal deficit present.      Mental Status: She is oriented to person, place, and time.     Narrative & Impression  EXAMINATION:  NM HEPATOBILIARY(HIDA) WITH PHARM AND EF WHEN  PERFORMED     CLINICAL HISTORY:  Abdominal pain, upper, chronic, assess gallbladder motility;  Nausea with vomiting, unspecified     TECHNIQUE:  Following the IV administration of 5 mCi of Tc-99m-mebrofenin, sequential dynamic anterior images of the abdomen were obtained for 60 minutes     When the gallbladder was maximally filled, the patient drank 8 ounces of Ensure and additional anterior images were acquired.  A gallbladder ejection fraction was calculated.     COMPARISON:  Ultrasound and CT 02/25/2025     FINDINGS:  The early images demonstrate homogeneous uptake of the radiopharmaceutical by the liver with no focal hepatic abnormalities.     Normal activity is present in the common bile duct, gallbladder, and small bowel.  Gallbladder 1st visualized at 7 minutes.  There is activity in the bowel at 33 minutes.     The clearance from the liver is appropriate.     Following Ensure administration, the estimated gallbladder ejection fraction is 19% % at 30 minutes.     Impression:     Abnormal gallbladder ejection fraction, concerning for chronic cholecystitis or gallbladder dysfunction.  No evidence of acute cholecystitis.        Electronically signed by:Sherrie Corral  Date:                                            04/01/2025  Time:                                           15:31    Assessment & Plan:    Dyskinesia of gallbladder  -     Ambulatory referral/consult to General Surgery        Patient will go to the operating room next week for a laparoscopic cholecystectomy.  Risks and benefits explained to the patient and family including risk of bleeding, infection, open operation, injury to surrounding organs, retained infection or retained persistent symptoms after the surgery.  All questions were answered.          [1]   Current Outpatient Medications   Medication Sig Dispense Refill    aMILoride (MIDAMOR) 5 MG Tab Take 1 tablet (5 mg total) by mouth once daily. 30 tablet 1    carvediloL (COREG) 6.25  MG tablet TAKE ONE TABLET BY MOUTH TWICE DAILY WITH MEALS 60 tablet 3    estradioL (ESTRACE) 0.01 % (0.1 mg/gram) vaginal cream Place 1 g vaginally twice a week. 42.5 g 1    estrogens, conjugated, (PREMARIN) 1.25 MG tablet Take 1.25 mg by mouth once daily.      glimepiride (AMARYL) 2 MG tablet Take 1 tablet (2 mg total) by mouth every morning. 90 tablet 1    levothyroxine (SYNTHROID) 100 MCG tablet Take 1 tablet (100 mcg total) by mouth before breakfast. 30 tablet 1    linaCLOtide (LINZESS) 145 mcg Cap capsule Take 1 capsule (145 mcg total) by mouth before breakfast. 90 capsule 3    LUMIGAN 0.01 % Drop Place 1 drop into both eyes every evening.      meloxicam (MOBIC) 7.5 MG tablet Take 1 tablet (7.5 mg total) by mouth once daily. 30 tablet 0    pantoprazole (PROTONIX) 40 MG tablet TAKE ONE TABLET BY MOUTH ONCE DAILY 90 tablet 1    pregabalin (LYRICA) 150 MG capsule Take 1 capsule (150 mg total) by mouth 2 (two) times daily. 180 capsule 1    rosuvastatin (CRESTOR) 10 MG tablet Take 1 tablet (10 mg total) by mouth every evening. 90 tablet 1    valsartan (DIOVAN) 160 MG tablet Take 1 tablet (160 mg total) by mouth every evening. 90 tablet 1    docusate sodium (COLACE) 100 MG capsule Take 1 capsule (100 mg total) by mouth 2 (two) times daily. (Patient not taking: Reported on 4/14/2025) 60 capsule 0    polyethylene glycol (GLYCOLAX) 17 gram PwPk Take 17 g by mouth once daily. (Patient not taking: Reported on 4/14/2025) 24 each 0     No current facility-administered medications for this visit.   [2]   Social History  Socioeconomic History    Marital status:    Tobacco Use    Smoking status: Never    Smokeless tobacco: Never    Tobacco comments:     Tesha   Substance and Sexual Activity    Alcohol use: Never    Drug use: Never    Sexual activity: Not Currently     Social Drivers of Health     Financial Resource Strain: High Risk (3/5/2025)    Overall Financial Resource Strain (CARDIA)     Difficulty of Paying  Living Expenses: Hard   Food Insecurity: Food Insecurity Present (3/5/2025)    Hunger Vital Sign     Worried About Running Out of Food in the Last Year: Sometimes true     Ran Out of Food in the Last Year: Sometimes true   Transportation Needs: Unmet Transportation Needs (3/5/2025)    PRAPARE - Transportation     Lack of Transportation (Medical): Yes     Lack of Transportation (Non-Medical): Yes   Physical Activity: Sufficiently Active (3/5/2025)    Exercise Vital Sign     Days of Exercise per Week: 7 days     Minutes of Exercise per Session: 30 min   Stress: Stress Concern Present (3/5/2025)    [employer] - Occupational Stress Questionnaire     Feeling of Stress : To some extent   Housing Stability: Low Risk  (3/5/2025)    Housing Stability Vital Sign     Unable to Pay for Housing in the Last Year: No     Number of Times Moved in the Last Year: 0     Homeless in the Last Year: No   Recent Concern: Housing Stability - High Risk (12/12/2024)    Received from [facility] SDOH Screening     In the past year, have you been unable to get any of the following when you really needed them? choose all that apply.: Utilities (electric, gas, and water)

## 2025-04-14 NOTE — PATIENT INSTRUCTIONS
[facility]  Instructions for surgery        Your surgery is scheduled for 4/21/2025 at [facility] on the 1st floor of [facility].Your arrival time is 8 a.m   You will be notified the day before  surgery verifying your arrival time for surgery.    Your preop lab work will be done today. Lab is on the 1st floor of the clinic.                                                                                                                                                                                                                                                                                                                                                                         Day of Surgery Instructions      Bring a list of all your medications with you the day of your surgery. You can also give the list to your doctor or nurse during your final clinic appointment before surgery.      Do not eat any solid foods or drink any liquids after 12:00 AM (midnight). This includes gum, hard candy, mints, and chewing tobacco.  Medications: Take any medications specified with a small sip of water the morning of your surgery.  Brush your teeth: You may brush your teeth and rinse your mouth. Do not swallow any water or toothpaste.  Clothing: A button front shirt and loose-fitting clothes are the most comfortable before and after surgery. We also recommend low-heeled shoes.  Hair: Avoid buns, ponytails, or hairpieces at the back of the head. Remove or avoid any clips, pins or bands that bind hair. Do not use hairspray. Before going to surgery, you will need to remove any wigs or hairpieces.  We will cover your hair during surgery. Your privacy regarding personal appearance will be respected.  Fingernails: Please be sure to remove all nail polish before you arrive for surgery. We understand that tips, wraps, gels, etc., are expensive; however, we ask these products  to be removed from at least one finger on each hand. Your fingertips are used to accurately monitor your oxygen level during surgery by a device called an oximeter.  Glasses and Contact Lenses: Wear glasses when possible. If contact lenses must be worn, bring a lens case and solution. If glasses are worn, bring a case for them.  Hearing Aids: If you rely on a hearing aid, wear it to the hospital on the day of surgery. This will ensure you can hear and understand everything we need to communicate with you.  Valuables: Jewelry, including body piercings, Dentures, money, and credit cards should be left at home. [facility] is not responsible for valuables that are not secured in our surgery center.  Makeup, Perfume, Creams, Lotions and Deodorants: Do not use any of these products on the day of surgery. Remove false eyelashes prior to surgery.  Implanted Medical Devices: If you have an implanted device, such as a pacemaker or AICD, bring the device information card (if you have it) with you.  Medical Equipment: If you have been fitted for a brace to wear after surgery or you have been given crutches, bring those with you to the surgery center.  Ankle Monitor: Ankle monitors MUST be removed prior to surgery.  Shower: Take a shower with Hibiclens® (chlorhexidine) (available over the counter). This reduces the chance of infection. PLEASE USE CHLORHEXIDINE WASH THE NIGHT BEFORE SURGERY AND THE MORNING OF SURGERY.  ONLY 2 VISITORS ARE ALLOWED WITH YOU ON DAY OF SURGERY.        Medication instructions:  You may take blood pressure medication with a small drink of water the morning of surgery.    Stop your blood thinner  days before surgery    IF YOU ARE ON ANY OF THESE BLOOD THINNERS, MAKE SURE YOUR PHYSICIAN IS AWARE.  Eliquis/Apixaban            Wafarin/Coumadin,Jantoven  Xarelto/Rivaroxaban      Pletal/Cilostazol  Plavix/Clopidogrel          Pradaxa/Dibigatran      If you are diabetic      Follow the diabetic medicine  instructions you received during your pre-operative visit.  DO NOT take your insulin or diabetic medications the morning of surgery.  When you arrive at the surgical center, be sure to tell the nurse you are diabetic.    The following blood sugar medications have to be stopped prior to surgery:    Hold 24 hours prior to surgery:    Libraglutide - Saxenda, Victoza  Lixisenatide --Adlxyin  Exenatide  --  Byetta  Empaglifozin--Jardiance  Sitaglitin--Januvia    Hold 1 week prior to surgery:    Semaglutide - Ozempic, Wegouy, Rybelsus  Dulaglutide - Trulicity  Tirzepatide - Mounjaro  Exenatide (extended release inj)-- Bydureon BCise      Hold 48 hours prior to surgery:    Metformin, Glucovance, Metaglip, Fortamet, Glucophage, Riomet, Avandamet, Glimepiride            Other Items to bring with you and know    Insurance card  Identification card such as 's license, passport, or other picture ID  Copy of your advance directives  List of medications and allergies, if not already provided  Name and phone number of person to contact if your condition changes significantly. YOU CANNOT DRIVE YOURSELF HOME FROM THE HOSPITAL THE DAY OF SURGERY.  PLEASE UNDERSTAND THAT OUR OFFICE DOES NOT GIVE PATHOLOGY RESULTS OR TEST RESULTS OVER THE PHONE. THIS WILL BE DISCUSSED WITH YOU ON YOUR FOLLOW UP APPOINTMENT.  IF YOU SUBMIT FMLA FORMS, IT WILL TAKE 3-7 DAYS TO COMPLETE THESE          Alcohol and Surgery  We want to help you prepare for and recover from surgery as quickly and safely as possible. Be open and honest with your provider about how many drinks you have per day. Excessive alcohol use is defined as drinking more than three drinks per day. It can affect the outcome of your surgery. Binge drinking (consuming large amounts of alcohol infrequently, such as on weekends) can also affect the outcome of your surgery.  Alcohol withdrawal  If you drink more than three drinks a day, you could have a complication, called alcohol  withdrawal, after surgery.  Alcohol withdrawal is a set of symptoms that people have when they suddenly stop drinking after using alcohol  for a long time. During withdrawal, a person's central nervous system overreacts. This can cause mild symptoms such as shakiness, sweating or hallucinating. It can also cause other more serious side effects. If not treated properly, alcohol withdrawal can cause potentially life-threatening complications after surgery. This can include tremors, seizures, hallucinations, delirium tremors, and even death. Untreated alcohol withdrawal often leads to a longer stay in the hospital, potentially in the Intensive Care Unit.  Chronic heavy drinking also can interfere with several organ systems and biochemical processes in the body.  This interference can cause serious, even life-threatening complications.  Your care team can offer alcohol withdrawal treatment to help:  Decrease the risk of seizures and delirium tremors after surgery  Decrease the risk we will need to restrain you for your own safety or the safety of others  Decrease your risk of falling after surgery  Reduce the use of potent sedative medications  Reduce the time you stay in the hospital after surgery  Reduce the time you might spend on a mechanical ventilator to help you breathe  Lower incidence of organ failure and biochemical complications  Talk to a member of your care team or your primary care physician about your alcohol use if you feel you may be at risk of any of these complications.        Smoking and Surgery  Quitting smoking is extremely important for a successful surgery and recovery. Cigarette smoking compromises your immune system. This increases your risk of an infection after surgery. Quitting the habit before surgery will decrease the surgical risks associated with smoking.

## 2025-04-14 NOTE — PROGRESS NOTES
General Surgery History and Physical      Patient ID: Amina Israel is a 70 y.o. female.    Chief Complaint: Gall Bladder Problem      HPI:  70-year-old female about 2 month history of right upper quadrant pain associated with eating with some nausea and occasional emesis.  She has been to the ER twice for this.  Over the last year she has been fighting for for some COVID infections and some pain from a fall as well as some constipation but all this is remote compared to her recent issues.  She has gone for a HIDA scan which showed a decreased ejection fraction and reproducible postprandial pain during the scan.  No known family history of gallbladder disease.  No major abdominal operations before besides some C-sections.  No blood thinners and she is still having the discomfort and issues overall.    Review of Systems   Constitutional:  Negative for activity change, appetite change, fatigue and fever.   HENT:  Negative for trouble swallowing.    Respiratory:  Negative for cough and shortness of breath.    Cardiovascular:  Negative for chest pain and palpitations.   Gastrointestinal:  Positive for abdominal pain, nausea and vomiting. Negative for abdominal distention, blood in stool, constipation, diarrhea and rectal pain.   Genitourinary:  Negative for flank pain.   Musculoskeletal:  Negative for neck pain and neck stiffness.   Neurological:  Negative for weakness.       Current Medications[1]    Review of patient's allergies indicates:   Allergen Reactions    Metformin Diarrhea    Phenergan plain Nausea And Vomiting       Past Medical History:   Diagnosis Date    Arthritis 2013    Cancer 2/10/2010    Pipulary throid cancer    Crohn's disease 2012    Diabetes mellitus, type 2     GERD (gastroesophageal reflux disease)     History of thyroid cancer 2005    Hyperlipidemia     Hypertension     Hypothyroidism     Immune  disorder 2010    Neuropathy     Obesity     Other and unspecified hyperlipidemia     Spinal stenosis     Spondylolisthesis        Past Surgical History:   Procedure Laterality Date    BREAST SURGERY  /    Disecctomy/breast reduction    CERVICAL DISCECTOMY  2005    C2-3     SECTION  2025    COSMETIC SURGERY  2006    Breast reduction    FOOT SURGERY Bilateral 2001    HYSTERECTOMY  2003    OOPHORECTOMY      POLYPECTOMY      REDUCTION OF BOTH BREASTS Bilateral 2006    SPINE SURGERY      Disectomy with bone fusion    TONSILLECTOMY  1974    TOTAL REDUCTION MAMMOPLASTY         Family History   Problem Relation Name Age of Onset    Hypertension Mother Clotgurmeet Prasader     Diabetic kidney disease Mother Clotgurmeet Pinto     Arthritis Mother Clotill Pinto     Diabetes Mother Clotill Pinto     Heart disease Mother Clotill Pinto     Stroke Mother Clotgurmeet Pinto     Tuberculosis Father      Asbestos Brother      Cancer Brother Manuel pinto Jr     Melanoma Brother Manuel pinto Jr     No Known Problems Daughter      Eczema Sister Pat Mera        Social History[2]    There were no vitals filed for this visit.    Physical Exam  Constitutional:       General: She is not in acute distress.  HENT:      Head: Normocephalic.   Cardiovascular:      Rate and Rhythm: Normal rate and regular rhythm.      Pulses: Normal pulses.   Pulmonary:      Effort: Pulmonary effort is normal. No respiratory distress.      Breath sounds: Normal breath sounds.   Abdominal:      General: Abdomen is flat. There is no distension.      Palpations: Abdomen is soft.      Tenderness: There is no abdominal tenderness.   Musculoskeletal:         General: Normal range of motion.   Skin:     General: Skin is warm.   Neurological:      General: No focal deficit present.      Mental Status: She is oriented to person, place, and time.     Narrative & Impression  EXAMINATION:  NM HEPATOBILIARY(HIDA) WITH PHARM AND EF WHEN  PERFORMED     CLINICAL HISTORY:  Abdominal pain, upper, chronic, assess gallbladder motility;  Nausea with vomiting, unspecified     TECHNIQUE:  Following the IV administration of 5 mCi of Tc-99m-mebrofenin, sequential dynamic anterior images of the abdomen were obtained for 60 minutes     When the gallbladder was maximally filled, the patient drank 8 ounces of Ensure and additional anterior images were acquired.  A gallbladder ejection fraction was calculated.     COMPARISON:  Ultrasound and CT 02/25/2025     FINDINGS:  The early images demonstrate homogeneous uptake of the radiopharmaceutical by the liver with no focal hepatic abnormalities.     Normal activity is present in the common bile duct, gallbladder, and small bowel.  Gallbladder 1st visualized at 7 minutes.  There is activity in the bowel at 33 minutes.     The clearance from the liver is appropriate.     Following Ensure administration, the estimated gallbladder ejection fraction is 19% % at 30 minutes.     Impression:     Abnormal gallbladder ejection fraction, concerning for chronic cholecystitis or gallbladder dysfunction.  No evidence of acute cholecystitis.        Electronically signed by:Sherrie Corral  Date:                                            04/01/2025  Time:                                           15:31    Assessment & Plan:    Dyskinesia of gallbladder  -     Ambulatory referral/consult to General Surgery        Patient will go to the operating room next week for a laparoscopic cholecystectomy.  Risks and benefits explained to the patient and family including risk of bleeding, infection, open operation, injury to surrounding organs, retained infection or retained persistent symptoms after the surgery.  All questions were answered.          [1]   Current Outpatient Medications   Medication Sig Dispense Refill    aMILoride (MIDAMOR) 5 MG Tab Take 1 tablet (5 mg total) by mouth once daily. 30 tablet 1    carvediloL (COREG) 6.25  MG tablet TAKE ONE TABLET BY MOUTH TWICE DAILY WITH MEALS 60 tablet 3    estradioL (ESTRACE) 0.01 % (0.1 mg/gram) vaginal cream Place 1 g vaginally twice a week. 42.5 g 1    estrogens, conjugated, (PREMARIN) 1.25 MG tablet Take 1.25 mg by mouth once daily.      glimepiride (AMARYL) 2 MG tablet Take 1 tablet (2 mg total) by mouth every morning. 90 tablet 1    levothyroxine (SYNTHROID) 100 MCG tablet Take 1 tablet (100 mcg total) by mouth before breakfast. 30 tablet 1    linaCLOtide (LINZESS) 145 mcg Cap capsule Take 1 capsule (145 mcg total) by mouth before breakfast. 90 capsule 3    LUMIGAN 0.01 % Drop Place 1 drop into both eyes every evening.      meloxicam (MOBIC) 7.5 MG tablet Take 1 tablet (7.5 mg total) by mouth once daily. 30 tablet 0    pantoprazole (PROTONIX) 40 MG tablet TAKE ONE TABLET BY MOUTH ONCE DAILY 90 tablet 1    pregabalin (LYRICA) 150 MG capsule Take 1 capsule (150 mg total) by mouth 2 (two) times daily. 180 capsule 1    rosuvastatin (CRESTOR) 10 MG tablet Take 1 tablet (10 mg total) by mouth every evening. 90 tablet 1    valsartan (DIOVAN) 160 MG tablet Take 1 tablet (160 mg total) by mouth every evening. 90 tablet 1    docusate sodium (COLACE) 100 MG capsule Take 1 capsule (100 mg total) by mouth 2 (two) times daily. (Patient not taking: Reported on 4/14/2025) 60 capsule 0    polyethylene glycol (GLYCOLAX) 17 gram PwPk Take 17 g by mouth once daily. (Patient not taking: Reported on 4/14/2025) 24 each 0     No current facility-administered medications for this visit.   [2]   Social History  Socioeconomic History    Marital status:    Tobacco Use    Smoking status: Never    Smokeless tobacco: Never    Tobacco comments:     Tesha   Substance and Sexual Activity    Alcohol use: Never    Drug use: Never    Sexual activity: Not Currently     Social Drivers of Health     Financial Resource Strain: High Risk (3/5/2025)    Overall Financial Resource Strain (CARDIA)     Difficulty of Paying  Living Expenses: Hard   Food Insecurity: Food Insecurity Present (3/5/2025)    Hunger Vital Sign     Worried About Running Out of Food in the Last Year: Sometimes true     Ran Out of Food in the Last Year: Sometimes true   Transportation Needs: Unmet Transportation Needs (3/5/2025)    PRAPARE - Transportation     Lack of Transportation (Medical): Yes     Lack of Transportation (Non-Medical): Yes   Physical Activity: Sufficiently Active (3/5/2025)    Exercise Vital Sign     Days of Exercise per Week: 7 days     Minutes of Exercise per Session: 30 min   Stress: Stress Concern Present (3/5/2025)    [employer] - Occupational Stress Questionnaire     Feeling of Stress : To some extent   Housing Stability: Low Risk  (3/5/2025)    Housing Stability Vital Sign     Unable to Pay for Housing in the Last Year: No     Number of Times Moved in the Last Year: 0     Homeless in the Last Year: No   Recent Concern: Housing Stability - High Risk (12/12/2024)    Received from [facility] SDOH Screening     In the past year, have you been unable to get any of the following when you really needed them? choose all that apply.: Utilities (electric, gas, and water)

## 2025-04-21 ENCOUNTER — ANESTHESIA (OUTPATIENT)
Dept: SURGERY | Facility: HOSPITAL | Age: 70
End: 2025-04-21
Payer: MEDICARE

## 2025-04-21 ENCOUNTER — ANESTHESIA EVENT (OUTPATIENT)
Dept: SURGERY | Facility: HOSPITAL | Age: 70
End: 2025-04-21
Payer: MEDICARE

## 2025-04-21 ENCOUNTER — HOSPITAL ENCOUNTER (OUTPATIENT)
Facility: HOSPITAL | Age: 70
Discharge: HOME | End: 2025-04-21
Attending: SURGERY | Admitting: SURGERY
Payer: MEDICARE

## 2025-04-21 VITALS
BODY MASS INDEX: 27.15 KG/M2 | HEART RATE: 51 BPM | RESPIRATION RATE: 16 BRPM | SYSTOLIC BLOOD PRESSURE: 157 MMHG | HEIGHT: 67 IN | OXYGEN SATURATION: 95 % | WEIGHT: 173 LBS | DIASTOLIC BLOOD PRESSURE: 86 MMHG | TEMPERATURE: 97 F

## 2025-04-21 DIAGNOSIS — Z01.818 PRE-PROCEDURAL EXAMINATION: ICD-10-CM

## 2025-04-21 DIAGNOSIS — K82.8 DYSKINESIA OF GALLBLADDER: Primary | ICD-10-CM

## 2025-04-21 LAB
GLUCOSE SERPL-MCNC: 110 MG/DL (ref 70–105)
GLUCOSE SERPL-MCNC: 117 MG/DL (ref 70–105)

## 2025-04-21 PROCEDURE — 63600175 PHARM REV CODE 636 W HCPCS: Performed by: ANESTHESIOLOGY

## 2025-04-21 PROCEDURE — 27000689 HC BLADE LARYNGOSCOPE ANY SIZE: Performed by: ANESTHESIOLOGY

## 2025-04-21 PROCEDURE — 36000711: Performed by: SURGERY

## 2025-04-21 PROCEDURE — D9220A PRA ANESTHESIA: Mod: CRNA,,, | Performed by: NURSE ANESTHETIST, CERTIFIED REGISTERED

## 2025-04-21 PROCEDURE — 71000015 HC POSTOP RECOV 1ST HR: Performed by: SURGERY

## 2025-04-21 PROCEDURE — 37000009 HC ANESTHESIA EA ADD 15 MINS: Performed by: SURGERY

## 2025-04-21 PROCEDURE — 63600175 PHARM REV CODE 636 W HCPCS: Performed by: SURGERY

## 2025-04-21 PROCEDURE — 71000033 HC RECOVERY, INTIAL HOUR: Performed by: SURGERY

## 2025-04-21 PROCEDURE — 27201423 OPTIME MED/SURG SUP & DEVICES STERILE SUPPLY: Performed by: SURGERY

## 2025-04-21 PROCEDURE — 71000016 HC POSTOP RECOV ADDL HR: Performed by: SURGERY

## 2025-04-21 PROCEDURE — 36000710: Performed by: SURGERY

## 2025-04-21 PROCEDURE — 25000003 PHARM REV CODE 250: Performed by: SURGERY

## 2025-04-21 PROCEDURE — 88304 TISSUE EXAM BY PATHOLOGIST: CPT | Mod: 26,,, | Performed by: PATHOLOGY

## 2025-04-21 PROCEDURE — 27000165 HC TUBE, ETT CUFFED: Performed by: ANESTHESIOLOGY

## 2025-04-21 PROCEDURE — 27000716 HC OXISENSOR PROBE, ANY SIZE: Performed by: ANESTHESIOLOGY

## 2025-04-21 PROCEDURE — 88304 TISSUE EXAM BY PATHOLOGIST: CPT | Mod: TC,SUR | Performed by: SURGERY

## 2025-04-21 PROCEDURE — 27000655: Performed by: ANESTHESIOLOGY

## 2025-04-21 PROCEDURE — 82962 GLUCOSE BLOOD TEST: CPT

## 2025-04-21 PROCEDURE — 63600175 PHARM REV CODE 636 W HCPCS: Performed by: NURSE ANESTHETIST, CERTIFIED REGISTERED

## 2025-04-21 PROCEDURE — 47562 LAPAROSCOPIC CHOLECYSTECTOMY: CPT | Mod: ,,, | Performed by: SURGERY

## 2025-04-21 PROCEDURE — 27000510 HC BLANKET BAIR HUGGER ANY SIZE: Performed by: ANESTHESIOLOGY

## 2025-04-21 PROCEDURE — 37000008 HC ANESTHESIA 1ST 15 MINUTES: Performed by: SURGERY

## 2025-04-21 PROCEDURE — D9220A PRA ANESTHESIA: Mod: ANES,,, | Performed by: ANESTHESIOLOGY

## 2025-04-21 PROCEDURE — 25000003 PHARM REV CODE 250: Performed by: NURSE ANESTHETIST, CERTIFIED REGISTERED

## 2025-04-21 RX ORDER — ONDANSETRON HYDROCHLORIDE 2 MG/ML
INJECTION, SOLUTION INTRAVENOUS
Status: DISCONTINUED | OUTPATIENT
Start: 2025-04-21 | End: 2025-04-21

## 2025-04-21 RX ORDER — SODIUM CHLORIDE 9 MG/ML
INJECTION, SOLUTION INTRAVENOUS CONTINUOUS
Status: DISCONTINUED | OUTPATIENT
Start: 2025-04-21 | End: 2025-04-21 | Stop reason: HOSPADM

## 2025-04-21 RX ORDER — PROPOFOL 10 MG/ML
VIAL (ML) INTRAVENOUS
Status: DISCONTINUED | OUTPATIENT
Start: 2025-04-21 | End: 2025-04-21

## 2025-04-21 RX ORDER — MEPERIDINE HYDROCHLORIDE 25 MG/ML
25 INJECTION INTRAMUSCULAR; INTRAVENOUS; SUBCUTANEOUS EVERY 10 MIN PRN
Status: DISCONTINUED | OUTPATIENT
Start: 2025-04-21 | End: 2025-04-21 | Stop reason: HOSPADM

## 2025-04-21 RX ORDER — GLUCAGON 1 MG
1 KIT INJECTION
Status: DISCONTINUED | OUTPATIENT
Start: 2025-04-21 | End: 2025-04-21 | Stop reason: HOSPADM

## 2025-04-21 RX ORDER — ONDANSETRON HYDROCHLORIDE 2 MG/ML
4 INJECTION, SOLUTION INTRAVENOUS DAILY PRN
Status: DISCONTINUED | OUTPATIENT
Start: 2025-04-21 | End: 2025-04-21 | Stop reason: HOSPADM

## 2025-04-21 RX ORDER — MORPHINE SULFATE 10 MG/ML
4 INJECTION INTRAMUSCULAR; INTRAVENOUS; SUBCUTANEOUS EVERY 5 MIN PRN
Status: DISCONTINUED | OUTPATIENT
Start: 2025-04-21 | End: 2025-04-21 | Stop reason: HOSPADM

## 2025-04-21 RX ORDER — MIDAZOLAM HYDROCHLORIDE 1 MG/ML
INJECTION INTRAMUSCULAR; INTRAVENOUS
Status: DISCONTINUED | OUTPATIENT
Start: 2025-04-21 | End: 2025-04-21

## 2025-04-21 RX ORDER — INDOCYANINE GREEN AND WATER 25 MG
6.25 KIT INJECTION ONCE
Status: COMPLETED | OUTPATIENT
Start: 2025-04-21 | End: 2025-04-21

## 2025-04-21 RX ORDER — ACETAMINOPHEN 10 MG/ML
1000 INJECTION, SOLUTION INTRAVENOUS ONCE
Status: COMPLETED | OUTPATIENT
Start: 2025-04-21 | End: 2025-04-21

## 2025-04-21 RX ORDER — HYDROMORPHONE HYDROCHLORIDE 2 MG/ML
0.5 INJECTION, SOLUTION INTRAMUSCULAR; INTRAVENOUS; SUBCUTANEOUS EVERY 5 MIN PRN
Status: DISCONTINUED | OUTPATIENT
Start: 2025-04-21 | End: 2025-04-21 | Stop reason: HOSPADM

## 2025-04-21 RX ORDER — HYDROCODONE BITARTRATE AND ACETAMINOPHEN 7.5; 325 MG/1; MG/1
1 TABLET ORAL ONCE
Refills: 0 | Status: COMPLETED | OUTPATIENT
Start: 2025-04-21 | End: 2025-04-21

## 2025-04-21 RX ORDER — BUPIVACAINE HYDROCHLORIDE 2.5 MG/ML
INJECTION, SOLUTION EPIDURAL; INFILTRATION; INTRACAUDAL; PERINEURAL
Status: DISCONTINUED | OUTPATIENT
Start: 2025-04-21 | End: 2025-04-21 | Stop reason: HOSPADM

## 2025-04-21 RX ORDER — LIDOCAINE HYDROCHLORIDE 20 MG/ML
INJECTION, SOLUTION EPIDURAL; INFILTRATION; INTRACAUDAL; PERINEURAL
Status: DISCONTINUED | OUTPATIENT
Start: 2025-04-21 | End: 2025-04-21

## 2025-04-21 RX ORDER — FENTANYL CITRATE 50 UG/ML
INJECTION, SOLUTION INTRAMUSCULAR; INTRAVENOUS
Status: DISCONTINUED | OUTPATIENT
Start: 2025-04-21 | End: 2025-04-21

## 2025-04-21 RX ORDER — HYDROCODONE BITARTRATE AND ACETAMINOPHEN 7.5; 325 MG/1; MG/1
1 TABLET ORAL EVERY 6 HOURS PRN
Qty: 15 TABLET | Refills: 0 | Status: SHIPPED | OUTPATIENT
Start: 2025-04-21

## 2025-04-21 RX ORDER — LABETALOL HYDROCHLORIDE 5 MG/ML
INJECTION, SOLUTION INTRAVENOUS
Status: DISCONTINUED | OUTPATIENT
Start: 2025-04-21 | End: 2025-04-21

## 2025-04-21 RX ORDER — ROCURONIUM BROMIDE 10 MG/ML
INJECTION, SOLUTION INTRAVENOUS
Status: DISCONTINUED | OUTPATIENT
Start: 2025-04-21 | End: 2025-04-21

## 2025-04-21 RX ORDER — CEFAZOLIN 2 G/1
2 INJECTION, POWDER, FOR SOLUTION INTRAMUSCULAR; INTRAVENOUS
Status: DISCONTINUED | OUTPATIENT
Start: 2025-04-21 | End: 2025-04-21 | Stop reason: HOSPADM

## 2025-04-21 RX ORDER — DIPHENHYDRAMINE HYDROCHLORIDE 50 MG/ML
25 INJECTION, SOLUTION INTRAMUSCULAR; INTRAVENOUS EVERY 6 HOURS PRN
Status: DISCONTINUED | OUTPATIENT
Start: 2025-04-21 | End: 2025-04-21 | Stop reason: HOSPADM

## 2025-04-21 RX ORDER — CEFAZOLIN SODIUM 1 G/3ML
INJECTION, POWDER, FOR SOLUTION INTRAMUSCULAR; INTRAVENOUS
Status: DISCONTINUED | OUTPATIENT
Start: 2025-04-21 | End: 2025-04-21

## 2025-04-21 RX ADMIN — SUGAMMADEX 200 MG: 100 INJECTION, SOLUTION INTRAVENOUS at 10:04

## 2025-04-21 RX ADMIN — ACETAMINOPHEN 1000 MG: 10 INJECTION INTRAVENOUS at 11:04

## 2025-04-21 RX ADMIN — LABETALOL HYDROCHLORIDE 10 MG: 5 INJECTION INTRAVENOUS at 10:04

## 2025-04-21 RX ADMIN — SODIUM CHLORIDE: 9 INJECTION, SOLUTION INTRAVENOUS at 09:04

## 2025-04-21 RX ADMIN — ONDANSETRON 4 MG: 2 INJECTION INTRAMUSCULAR; INTRAVENOUS at 09:04

## 2025-04-21 RX ADMIN — CEFAZOLIN 2 G: 1 INJECTION, POWDER, FOR SOLUTION INTRAMUSCULAR; INTRAVENOUS; PARENTERAL at 09:04

## 2025-04-21 RX ADMIN — MIDAZOLAM HYDROCHLORIDE 2 MG: 1 INJECTION, SOLUTION INTRAMUSCULAR; INTRAVENOUS at 09:04

## 2025-04-21 RX ADMIN — ROCURONIUM BROMIDE 40 MG: 10 INJECTION, SOLUTION INTRAVENOUS at 09:04

## 2025-04-21 RX ADMIN — Medication 6.25 MG: at 09:04

## 2025-04-21 RX ADMIN — PROPOFOL 180 MG: 10 INJECTION, EMULSION INTRAVENOUS at 09:04

## 2025-04-21 RX ADMIN — LIDOCAINE HYDROCHLORIDE 60 MG: 20 INJECTION, SOLUTION EPIDURAL; INFILTRATION; INTRACAUDAL; PERINEURAL at 09:04

## 2025-04-21 RX ADMIN — HYDROCODONE BITARTRATE AND ACETAMINOPHEN 1 TABLET: 7.5; 325 TABLET ORAL at 12:04

## 2025-04-21 RX ADMIN — FENTANYL CITRATE 100 MCG: 50 INJECTION, SOLUTION INTRAMUSCULAR; INTRAVENOUS at 09:04

## 2025-04-21 NOTE — ANESTHESIA PROCEDURE NOTES
Intubation    Date/Time: 4/21/2025 9:47 AM    Performed by: Stacy Mladonado CRNA  Authorized by: Beni Palmer MD    Intubation:     Induction:  Intravenous    Intubated:  Postinduction    Mask Ventilation:  Easy mask    Attempts:  1    Attempted By:  Student    Method of Intubation:  Video laryngoscopy    Blade:  Glidescope 3    Laryngeal View Grade: Grade I - full view of cords      Difficult Airway Encountered?: No      Complications:  None    Airway Device:  Oral endotracheal tube    Airway Device Size:  7.0    Style/Cuff Inflation:  Cuffed    Inflation Amount (mL):  7    Tube secured:  21    Placement Verified By:  Capnometry    Complicating Factors:  None    Findings Post-Intubation:  BS equal bilateral and atraumatic/condition of teeth unchanged

## 2025-04-21 NOTE — DISCHARGE SUMMARY
[facility] - Periop Services  Discharge Note  Short Stay    Procedure(s) (LRB):  CHOLECYSTECTOMY, LAPAROSCOPIC (N/A)      OUTCOME: Patient tolerated treatment/procedure well without complication and is now ready for discharge.    DISPOSITION: Home or Self Care    FINAL DIAGNOSIS:  Dyskinesia of gallbladder    FOLLOWUP: In clinic    DISCHARGE INSTRUCTIONS:    Discharge Procedure Orders   Diet Adult Regular     Remove dressing in 24 hours     Notify your health care provider if you experience any of the following:  temperature >100.4     Notify your health care provider if you experience any of the following:  persistent nausea and vomiting or diarrhea     Notify your health care provider if you experience any of the following:  severe uncontrolled pain     Notify your health care provider if you experience any of the following:  redness, tenderness, or signs of infection (pain, swelling, redness, odor or green/yellow discharge around incision site)     Notify your health care provider if you experience any of the following:  difficulty breathing or increased cough     Notify your health care provider if you experience any of the following:  severe persistent headache     Notify your health care provider if you experience any of the following:  worsening rash     Notify your health care provider if you experience any of the following:  persistent dizziness, light-headedness, or visual disturbances     Notify your health care provider if you experience any of the following:  increased confusion or weakness     Notify your health care provider if you experience any of the following:     Shower on day dressing removed (No bath)        TIME SPENT ON DISCHARGE: 5 minutes

## 2025-04-21 NOTE — TRANSFER OF CARE
"Anesthesia Transfer of Care Note    Patient: Amina Israel    Procedure(s) Performed: Procedure(s) (LRB):  CHOLECYSTECTOMY, LAPAROSCOPIC (N/A)    Patient location: PACU    Anesthesia Type: general    Transport from OR: Transported from OR on room air with adequate spontaneous ventilation    Post pain: adequate analgesia    Post assessment: no apparent anesthetic complications and tolerated procedure well    Post vital signs: stable    Level of consciousness: responds to stimulation    Nausea/Vomiting: no nausea/vomiting    Complications: none    Transfer of care protocol was followedComments: Report Given to PACU rn VSS      Last vitals: Visit Vitals  BP (!) 151/80 (BP Location: Right arm, Patient Position: Lying)   Pulse 65   Temp 36.3 °C (97.4 °F) (Oral)   Resp 17   Ht 5' 7" (1.702 m)   Wt 78.5 kg (173 lb)   SpO2 99%   Breastfeeding No   BMI 27.10 kg/m²     "

## 2025-04-21 NOTE — ANESTHESIA POSTPROCEDURE EVALUATION
Anesthesia Post Evaluation    Patient: Amina Israel    Procedure(s) Performed: Procedure(s):  XI ROBOTIC CHOLECYSTECTOMY    Final Anesthesia Type: general      Patient location during evaluation: PACU  Post-procedure vital signs: reviewed and stable  Pain management: adequate  Airway patency: patent    PONV status at discharge: No PONV  Anesthetic complications: no      Cardiovascular status: hemodynamically stable  Respiratory status: unassisted  Hydration status: euvolemic  Follow-up not needed.              Vitals Value Taken Time   /86 04/21/25 12:46   Temp 36.3 °C (97.4 °F) 04/21/25 10:52   Pulse 58 04/21/25 12:50   Resp 16 04/21/25 12:13   SpO2 96 % 04/21/25 12:50   Vitals shown include unfiled device data.      Event Time   Out of Recovery 11:35:00         Pain/Simon Score: Pain Rating Prior to Med Admin: 5 (4/21/2025 12:13 PM)  Simon Score: 10 (4/21/2025  1:01 PM)

## 2025-04-21 NOTE — OP NOTE
[facility] - Periop Services  Surgery Department  Operative Note    SUMMARY     Date of Procedure: 4/21/2025     Procedure: Procedure(s):  XI ROBOTIC CHOLECYSTECTOMY     Surgeons and Role:     * Juan Granda MD - Primary    Assisting Surgeon: None    Pre-Operative Diagnosis: Dyskinesia of gallbladder [K82.8]    Post-Operative Diagnosis: Post-Op Diagnosis Codes:     * Dyskinesia of gallbladder [K82.8]    Anesthesia: * No anesthesia type entered *    Procedures Performed:  Robotic cholecystectomy    Significant Findings of the Procedure:  Mild chronic cholecystitis    Procedure in Detail: After informed consent patient brought to the OR prepped and draped in the usual sterile fashion.  We started off by optically inserting our 8 mm robotic trocar at the supraumbilical area.  Pneumoperitoneum obtained to 15 mmHg of pressure.  We then under direct visualization placed an additional 12 mm in the left subcostal area, 8 mm robotic trocar in the right paramedian area and a 5 mm assistant port in the right lateral abdominal region.  We then came in and docked the robot after placing the patient in reverse Trendelenburg.  We placed a hook with cautery in port 3., the camera port 2., and a ProGrasp in port 1.  We then retracted the gallbladder over the liver and began dissecting out the critical view.  The duct and the artery were the only 2 structures and clips were placed twice distally once proximally and cut between.  We then took the gallbladder off the gallbladder fossa.  No holes were made in the gallbladder and liver.  We then used an Endo-Catch bag to remove it through the 12 mm robotic port site.  We then inspected our liver bed, clips, port sites they are all hemostatic to the intact.  We then discontinue pneumoperitoneum and closed the skin with a 4-0 Monocryl subcuticular manner.  Patient tolerated the procedure well.      Complications: No    Estimated Blood Loss (EBL): 5 cc           Implants: *  No implants in log *    Specimens:   Specimen (24h ago, onward)       Start     Ordered    04/21/25 1022  Surgical Pathology  RELEASE UPON ORDERING         04/21/25 1022                            Condition: Good    Disposition: PACU - hemodynamically stable.    Attestation: I was present and scrubbed for the entire procedure.

## 2025-04-21 NOTE — OR NURSING
1052: x 1 lap puncture dressing on ABD draining with blood. Applied a 4x4 guaze and surgical tape with pressure. Surgery team still at bedside.    1111:  Reassessed ABD dressing and small drainage noted to the previously dressed lap puncture.     1135: Patient transferred to outpatient. O2  97% on RA, /86, HR 56. Neuro intact. x 4 Lap punctures on ABD dry and intact with area marked for drainage. Family at the bedside. Report given to RICHARD Whatley. Care released at this time.

## 2025-04-21 NOTE — ANESTHESIA PREPROCEDURE EVALUATION
04/21/2025  Amina Israel is a 70 y.o., female.      Pre-op Assessment    I have reviewed the Patient Summary Reports.    I have reviewed the NPO Status.   I have reviewed the Medications.     Review of Systems         Anesthesia Plan  Type of Anesthesia, risks & benefits discussed:    Anesthesia Type: Gen ETT  Intra-op Monitoring Plan: Standard ASA Monitors  Post Op Pain Control Plan: IV/PO Opioids PRN  Induction:  IV  Informed Consent: Informed consent signed with the Patient and all parties understand the risks and agree with anesthesia plan.  All questions answered.   ASA Score: 3    Ready For Surgery From Anesthesia Perspective.     .  NPO greater than 8 hours  No anesthetic complications  Allergies      Corticosteroids (Glucocorticoids) Drug Class  Not Specified  4/21/2025   Steroid injections   Metformin Drug Ingredient Diarrhea Not Specified  6/4/2024      Phenergan Plain          HTN  NIDDM  GERD  JANET  H/o back issues    MP II; adequate ROM at neck    Based upon the description the patient provided, it seems like she had a post dural puncture headache after an epidural steroid injection rather than an allergic reaction to the steroid

## 2025-04-28 ENCOUNTER — OFFICE VISIT (OUTPATIENT)
Dept: CARDIOLOGY | Facility: CLINIC | Age: 70
End: 2025-04-28
Payer: MEDICARE

## 2025-04-28 VITALS
BODY MASS INDEX: 25.9 KG/M2 | DIASTOLIC BLOOD PRESSURE: 96 MMHG | HEIGHT: 67 IN | SYSTOLIC BLOOD PRESSURE: 132 MMHG | OXYGEN SATURATION: 99 % | WEIGHT: 165 LBS | HEART RATE: 79 BPM

## 2025-04-28 DIAGNOSIS — R07.9 CHEST PAIN, UNSPECIFIED TYPE: ICD-10-CM

## 2025-04-28 DIAGNOSIS — I10 PRIMARY HYPERTENSION: Primary | ICD-10-CM

## 2025-04-28 PROCEDURE — 99999 PR PBB SHADOW E&M-EST. PATIENT-LVL IV: CPT | Mod: PBBFAC,,, | Performed by: INTERNAL MEDICINE

## 2025-04-28 PROCEDURE — 3080F DIAST BP >= 90 MM HG: CPT | Mod: CPTII,,, | Performed by: INTERNAL MEDICINE

## 2025-04-28 PROCEDURE — 3288F FALL RISK ASSESSMENT DOCD: CPT | Mod: CPTII,,, | Performed by: INTERNAL MEDICINE

## 2025-04-28 PROCEDURE — 99213 OFFICE O/P EST LOW 20 MIN: CPT | Mod: S$PBB,,, | Performed by: INTERNAL MEDICINE

## 2025-04-28 PROCEDURE — 99214 OFFICE O/P EST MOD 30 MIN: CPT | Mod: PBBFAC | Performed by: INTERNAL MEDICINE

## 2025-04-28 PROCEDURE — 1101F PT FALLS ASSESS-DOCD LE1/YR: CPT | Mod: CPTII,,, | Performed by: INTERNAL MEDICINE

## 2025-04-28 PROCEDURE — 3008F BODY MASS INDEX DOCD: CPT | Mod: CPTII,,, | Performed by: INTERNAL MEDICINE

## 2025-04-28 PROCEDURE — 1159F MED LIST DOCD IN RCRD: CPT | Mod: CPTII,,, | Performed by: INTERNAL MEDICINE

## 2025-04-28 PROCEDURE — 3075F SYST BP GE 130 - 139MM HG: CPT | Mod: CPTII,,, | Performed by: INTERNAL MEDICINE

## 2025-04-28 PROCEDURE — 1125F AMNT PAIN NOTED PAIN PRSNT: CPT | Mod: CPTII,,, | Performed by: INTERNAL MEDICINE

## 2025-04-28 PROCEDURE — 3066F NEPHROPATHY DOC TX: CPT | Mod: CPTII,,, | Performed by: INTERNAL MEDICINE

## 2025-04-28 PROCEDURE — 3044F HG A1C LEVEL LT 7.0%: CPT | Mod: CPTII,,, | Performed by: INTERNAL MEDICINE

## 2025-04-28 PROCEDURE — 4010F ACE/ARB THERAPY RXD/TAKEN: CPT | Mod: CPTII,,, | Performed by: INTERNAL MEDICINE

## 2025-04-28 NOTE — PROGRESS NOTES
PCP: Vickie Buckley MD    Referring Provider:     Subjective:   Amina Israel is a 70 y.o. female with hx of  bradycardia who presents for evaluation of slow heart beat.      Pt reports  chest tightness and shortness of breath have improved,  She is more active,    She is active, was walking, riding bike up to total 8 miles a day, has recovered from  mechanical fall. She continues to walk with daily activities still getting 4000 steps a day.         Fhx:  Family History   Problem Relation Name Age of Onset    Hypertension Mother Clotill Culver     Diabetic kidney disease Mother Clotill Culver     Arthritis Mother Clotill Culver     Diabetes Mother Clotill Culver     Heart disease Mother Clotill Culver     Stroke Mother Clotill Culver     Tuberculosis Father      Asbestos Brother      Cancer Brother Manuel culver      Melanoma Brother Manuel culver      No Known Problems Daughter      Eczema Sister aPt Mera       Shx:   Past Surgical History:   Procedure Laterality Date    BREAST SURGERY      Disecctomy/breast reduction    CERVICAL DISCECTOMY      C2-3     SECTION  2025    COSMETIC SURGERY      Breast reduction    FOOT SURGERY Bilateral     HYSTERECTOMY      OOPHORECTOMY      POLYPECTOMY      REDUCTION OF BOTH BREASTS Bilateral     ROBOT-ASSISTED CHOLECYSTECTOMY USING DA [employer] XI  2025    Procedure: XI ROBOTIC CHOLECYSTECTOMY;  Surgeon: Juan Granda MD;  Location: [facility];  Service: General;;    SPINE SURGERY      Disectomy with bone fusion    TONSILLECTOMY      TOTAL REDUCTION MAMMOPLASTY         EKG - NSR, normal.     ECHO - No results found for this or any previous visit.       CATH - No results found for this or any previous visit.       Stress - No results found for this or any previous visit.       Lab Results   Component Value Date     2025    K 4.2 2025     (H) 2025    CO2 26 2025    BUN 15  "04/14/2025    CREATININE 0.91 04/14/2025    CALCIUM 9.4 04/14/2025    ANIONGAP 10 04/14/2025       Lab Results   Component Value Date    CHOL 120 03/12/2024     Lab Results   Component Value Date    HDL 53 03/12/2024     Lab Results   Component Value Date    LDLCALC 51 03/12/2024     Lab Results   Component Value Date    TRIG 79 03/12/2024     Lab Results   Component Value Date    CHOLHDL 2.3 03/12/2024       Lab Results   Component Value Date    WBC 10.67 04/14/2025    HGB 14.1 04/14/2025    HCT 44.0 04/14/2025    MCV 94.6 04/14/2025     04/14/2025     Transthoracic echo (TTE) complete    Height: 5' 7" (1.702 m)   Weight: 78.9 kg (174 lb)   Blood Pressure: Not recorded    Date of Study: 4/3/25   Ordering Provider: Obed Durant MD    Clinical Indications: Palpitations [R00.2 (ICD-10-CM)]       Reading Physicians  Performing Staff   Cardiology: Elissa Patten MD     Tech: Teodora Kimbrough RCS         Reason for Exam  Priority: Routine  Dx: Palpitations [R00.2 (ICD-10-CM)]     View Images Vital Vitrea     Show images for Echo  Summary  Show Result Comparison     Left Ventricle: The left ventricle is normal in size. Mildly increased wall thickness. There is concentric remodeling. There is normal systolic function with a visually estimated ejection fraction of 60 - 65%. There is normal diastolic function.    Right Ventricle: The right ventricle is normal in size. Systolic function is normal.    Pulmonary Artery: Pulmonary artery pressure could not be accurately determined.    IVC/SVC: Normal venous pressure at 3 mmHg.     NM Myocardial Perfusion Spect Multi Pharmacologic  Status: Final result     MyChart Results Release    MyChart Status: Active  Results Release     PACS Images for [facility] [place] Viewer     Show images for NM Myocardial Perfusion Spect Multi Pharmacologic  All Reviewers List    Alec More DO on 4/8/2025 09:53     NM Myocardial Perfusion Spect Multi Pharmacologic  Order: [id number] "   Status: Final result       Next appt: 04/30/2025 at 03:30 PM in Family Medicine (Vickie Buckley MD)       Dx: Palpitations; Angina pectoris with co...    Test Result Released: Yes (seen)    0 Result Notes  Details    Reading Physician Reading Date Result Priority   Elissa Patten MD  [phone]  4/4/2025 Routine     Narrative & Impression  EXAMINATION:  NM MYOCARDIAL PERFUSION SPECT MULTI PHARM     CLINICAL HISTORY:  PALPITATIONS;  Palpitations     TECHNIQUE:  SPECT images in short, vertical and horizontal long axis were acquired 30 minutes after the injection of 11.0 mCi of Tc-99m sestamibi at rest and 35.0 mCi during a cardiac stress. The clinical stress and ECG portion of the study is to be read separately.     COMPARISON:  None.     FINDINGS:  The quality of the study is adequate.     Stress SPECT images demonstrate homogenous distribution of the tracer throughout the left ventricle. On the resting images, there is matched homogenous distribution of the tracer throughout the left ventricle.     The gated post-stress images reveal normal wall motion and normal systolic wall thickening with an estimated LVEF of 79 %. The LV cavity (is not) dilated with an end-diastolic volume of (50 ml- normal less than 140) ml and an end-systolic volume of (10 ml- normal less than 70) ml.     Normal TID ratio- 0.96     Impression:     1.  Scintigraphically negative for ischemia or infarct.  2. the global left ventricular systolic function is normal with an LV ejection fraction of 79 % and no evidence of LV dilatation. Wall motion is normal.        Electronically signed by:Elissa Patten  Date:                                            04/04/2025  Time:                                           17:36        Exam Ended: 04/03/25 11:35 CDT Last Resulted: 04/04/25 17:36 CDT         Current Medications[1]    Review of Systems   Constitutional: Negative for diaphoresis, malaise/fatigue, night sweats and weight gain.   HENT:  Negative for  "congestion, ear pain, hearing loss, nosebleeds and sore throat.    Eyes:  Negative for blurred vision, double vision, pain, photophobia and visual disturbance.        Glacoma: controlled with drops.    Cardiovascular:  Positive for irregular heartbeat. Negative for chest pain, claudication, dyspnea on exertion, leg swelling, near-syncope, orthopnea, palpitations and syncope.        Chest tightness with palpitations.    Respiratory:  Positive for shortness of breath. Negative for cough, sleep disturbances due to breathing, snoring and wheezing.    Endocrine: Negative for cold intolerance, heat intolerance, polydipsia, polyphagia and polyuria.   Hematologic/Lymphatic: Negative for bleeding problem. Does not bruise/bleed easily.        Hx thyroid cancer, post iodine ablation.    Skin:  Negative for dry skin, flushing, itching, rash and skin cancer.   Musculoskeletal:  Positive for arthritis and back pain. Negative for falls, joint pain, muscle cramps, muscle weakness and myalgias.        Bilat OA in knees   Gastrointestinal:  Positive for constipation and heartburn. Negative for abdominal pain, change in bowel habit, diarrhea, dysphagia, nausea and vomiting.        Hx esophageal stricture, last dilated two months ago   Genitourinary:  Negative for bladder incontinence, dysuria, flank pain, frequency and nocturia.   Neurological:  Negative for dizziness, focal weakness, headaches, light-headedness, loss of balance, numbness, paresthesias and seizures.   Psychiatric/Behavioral:  Negative for depression, memory loss and substance abuse. The patient is not nervous/anxious.    Allergic/Immunologic: Negative for environmental allergies.          Objective:   BP (!) 132/96 (BP Location: Right arm, Patient Position: Sitting)   Pulse 79   Ht 5' 7" (1.702 m)   Wt 74.8 kg (165 lb)   SpO2 99%   BMI 25.84 kg/m²       Physical Exam  Vitals and nursing note reviewed.   Constitutional:       Appearance: Normal appearance. She is " obese.   HENT:      Head: Normocephalic and atraumatic.      Right Ear: External ear normal.      Left Ear: External ear normal.   Eyes:      General: No scleral icterus.        Right eye: No discharge.         Left eye: No discharge.      Extraocular Movements: Extraocular movements intact.      Conjunctiva/sclera: Conjunctivae normal.      Pupils: Pupils are equal, round, and reactive to light.   Cardiovascular:      Rate and Rhythm: Normal rate and regular rhythm.      Pulses: Normal pulses.      Heart sounds: Normal heart sounds. No murmur heard.     No friction rub. No gallop.   Pulmonary:      Effort: Pulmonary effort is normal.      Breath sounds: Normal breath sounds. No wheezing, rhonchi or rales.   Chest:      Chest wall: No tenderness.   Abdominal:      General: Abdomen is flat. Bowel sounds are normal. There is no distension.      Palpations: Abdomen is soft.      Tenderness: There is no abdominal tenderness. There is no guarding or rebound.   Musculoskeletal:         General: No swelling or tenderness. Normal range of motion.      Cervical back: Normal range of motion and neck supple.   Skin:     General: Skin is warm and dry.      Findings: No erythema or rash.   Neurological:      General: No focal deficit present.      Mental Status: She is alert and oriented to person, place, and time.      Cranial Nerves: No cranial nerve deficit.      Motor: No weakness.      Gait: Gait normal.   Psychiatric:         Mood and Affect: Mood normal.         Behavior: Behavior normal.         Thought Content: Thought content normal.         Judgment: Judgment normal.         Assessment:     1. Primary hypertension      controllded on current meds, continue same, discussed lifestyle changes.      2. Chest pain, unspecified type      Chest pain has resolved, stress imaging did not demonstrate ischemia, continue current meds, follow up in one year            Follow up in one year, sooner if symptoms change                  [1]   Current Outpatient Medications:     aMILoride (MIDAMOR) 5 MG Tab, Take 1 tablet (5 mg total) by mouth once daily., Disp: 30 tablet, Rfl: 1    carvediloL (COREG) 6.25 MG tablet, TAKE ONE TABLET BY MOUTH TWICE DAILY WITH MEALS, Disp: 60 tablet, Rfl: 3    estradioL (ESTRACE) 0.01 % (0.1 mg/gram) vaginal cream, Place 1 g vaginally twice a week., Disp: 42.5 g, Rfl: 1    estrogens, conjugated, (PREMARIN) 1.25 MG tablet, Take 1.25 mg by mouth once daily., Disp: , Rfl:     glimepiride (AMARYL) 2 MG tablet, Take 1 tablet (2 mg total) by mouth every morning., Disp: 90 tablet, Rfl: 1    levothyroxine (SYNTHROID) 100 MCG tablet, Take 1 tablet (100 mcg total) by mouth before breakfast., Disp: 30 tablet, Rfl: 1    linaCLOtide (LINZESS) 145 mcg Cap capsule, Take 1 capsule (145 mcg total) by mouth before breakfast., Disp: 90 capsule, Rfl: 3    LUMIGAN 0.01 % Drop, Place 1 drop into both eyes every evening., Disp: , Rfl:     meloxicam (MOBIC) 7.5 MG tablet, Take 1 tablet (7.5 mg total) by mouth once daily., Disp: 30 tablet, Rfl: 0    pregabalin (LYRICA) 150 MG capsule, Take 1 capsule (150 mg total) by mouth 2 (two) times daily., Disp: 180 capsule, Rfl: 1    rosuvastatin (CRESTOR) 10 MG tablet, Take 1 tablet (10 mg total) by mouth every evening., Disp: 90 tablet, Rfl: 1    valsartan (DIOVAN) 160 MG tablet, Take 1 tablet (160 mg total) by mouth every evening., Disp: 90 tablet, Rfl: 1    docusate sodium (COLACE) 100 MG capsule, Take 1 capsule (100 mg total) by mouth 2 (two) times daily. (Patient not taking: Reported on 4/28/2025), Disp: 60 capsule, Rfl: 0    HYDROcodone-acetaminophen (NORCO) 7.5-325 mg per tablet, Take 1 tablet by mouth every 6 (six) hours as needed for Pain. (Patient not taking: Reported on 4/28/2025), Disp: 15 tablet, Rfl: 0    pantoprazole (PROTONIX) 40 MG tablet, TAKE ONE TABLET BY MOUTH ONCE DAILY, Disp: 90 tablet, Rfl: 1    polyethylene glycol (GLYCOLAX) 17 gram PwPk, Take 17 g by mouth once daily.  (Patient not taking: Reported on 4/28/2025), Disp: 24 each, Rfl: 0

## 2025-04-30 ENCOUNTER — OFFICE VISIT (OUTPATIENT)
Dept: FAMILY MEDICINE | Facility: CLINIC | Age: 70
End: 2025-04-30
Payer: MEDICARE

## 2025-04-30 VITALS
RESPIRATION RATE: 18 BRPM | SYSTOLIC BLOOD PRESSURE: 109 MMHG | BODY MASS INDEX: 26.15 KG/M2 | OXYGEN SATURATION: 98 % | HEART RATE: 76 BPM | DIASTOLIC BLOOD PRESSURE: 73 MMHG | WEIGHT: 166.63 LBS | TEMPERATURE: 98 F | HEIGHT: 67 IN

## 2025-04-30 DIAGNOSIS — Z71.3 DIETARY COUNSELING: ICD-10-CM

## 2025-04-30 DIAGNOSIS — E11.9 TYPE 2 DIABETES MELLITUS WITHOUT COMPLICATION, WITHOUT LONG-TERM CURRENT USE OF INSULIN: ICD-10-CM

## 2025-04-30 DIAGNOSIS — Z90.49 S/P CHOLECYSTECTOMY: Primary | ICD-10-CM

## 2025-04-30 NOTE — PROGRESS NOTES
Vickie Buckley MD MPH  [address], [place] [address]  Phone: [phone]     Subjective     Name: Amina Israel   YOB: 1955 (70 y.o.)  MRN: [id number]  Visit Date: 4/30/2025   Chief Complaint: Hypertension and Follow-up (Room 8 1 month f/u hypertension )        HISTORY OF PRESENT ILLNESS:  Patient is a 70-year-old female who presented to the clinic to establish care. The patient has a past medical history of type 2 diabetes, hypertension, dyslipidemia, and chronic back pain. The patient previously saw Dr. Durant for her chronic problems.  Patient underwent robotic cholecystomy on 4/21/2025 by Dr Granda. No post op complications.  She saw Dr More for bradycardia on 4/28, Stress test was unremarkable.   Follow up with cardiology asneeded.     Patient was not in any acute distress during the encounter. Patient is compliant with the daily medications and doesn't experience any problems with the current medication regimen.  Patient denied any SOB, wheezing, chills, cough, fevers, palpitations, leg swelling, chest pain, gastrointestinal symptoms, genitourinary symptoms, or myalgias.          PAST MEDICAL HISTORY:  Significant Diagnoses - Patient  has a past medical history of Arthritis (2013), Cancer (2/10/2010), Crohn's disease (2012), Diabetes mellitus, type 2, GERD (gastroesophageal reflux disease), History of thyroid cancer (2005), Hyperlipidemia, Hypertension, Hypothyroidism, Immune disorder (2010), Neuropathy, Obesity, Other and unspecified hyperlipidemia, Spinal stenosis, and Spondylolisthesis.  Medications - Patient has a current medication list which includes the following long-term medication(s): amiloride, carvedilol, estradiol, estrogens (conjugated), glimepiride, levothyroxine, lumigan, pantoprazole, pregabalin, rosuvastatin, and valsartan.   Allergies - Patient is allergic to corticosteroids (glucocorticoids), metformin, and phenergan plain.  Surgeries - Patient  has a past surgical  history that includes Tonsillectomy (); Hysterectomy (); Reduction of both breasts (Bilateral, ); Cervical discectomy (); Foot surgery (Bilateral, ); Polypectomy; Oophorectomy; Total Reduction Mammoplasty; Spine surgery (); Cosmetic surgery (); Breast surgery ();  section (); and Robot-assisted cholecystectomy using da [employer] [facility] (2025).  Family History - Patient family history includes Arthritis in her mother; Asbestos in her brother; Cancer in her brother; Diabetes in her mother; Diabetic kidney disease in her mother; Eczema in her sister; Heart disease in her mother; Hypertension in her mother; Melanoma in her brother; No Known Problems in her daughter; Stroke in her mother; Tuberculosis in her father.      SOCIAL HISTORY:  Tobacco - Patient  reports that she has never smoked. She has never used smokeless tobacco.   Alcohol - Patient  reports no history of alcohol use.   Recreational Drugs - Patient  reports no history of drug use.       Review of Systems   Constitutional:  Negative for activity change, appetite change, chills, fatigue and fever.   HENT:  Negative for nasal congestion, ear discharge, ear pain, hearing loss, postnasal drip, rhinorrhea, sinus pressure/congestion and sore throat.    Eyes:  Negative for discharge, itching and visual disturbance.   Respiratory:  Negative for cough, choking, chest tightness, shortness of breath and wheezing.    Cardiovascular:  Negative for chest pain, palpitations, leg swelling and claudication.   Gastrointestinal:  Positive for constipation. Negative for abdominal distention, abdominal pain, diarrhea, nausea, vomiting and reflux.   Genitourinary:  Negative for difficulty urinating, dysuria, frequency, hematuria and urgency.   Musculoskeletal:  Negative for arthralgias, back pain, joint swelling and myalgias.   Integumentary:  Negative for rash.   Neurological:  Negative for tremors, light-headedness, numbness and  headaches.          Past Medical History:   Diagnosis Date    Arthritis 2013    Cancer 2/10/2010    Pipulary throid cancer    Crohn's disease     Diabetes mellitus, type 2     GERD (gastroesophageal reflux disease)     History of thyroid cancer     Hyperlipidemia     Hypertension     Hypothyroidism     Immune disorder 2010    Neuropathy     Obesity     Other and unspecified hyperlipidemia     Spinal stenosis     Spondylolisthesis         Review of patient's allergies indicates:   Allergen Reactions    Corticosteroids (glucocorticoids)      Steroid injections    Metformin Diarrhea    Phenergan plain Nausea And Vomiting        Past Surgical History:   Procedure Laterality Date    BREAST SURGERY      Disecctomy/breast reduction    CERVICAL DISCECTOMY      C2-3     SECTION  2025    COSMETIC SURGERY  2006    Breast reduction    FOOT SURGERY Bilateral 2001    HYSTERECTOMY  2003    OOPHORECTOMY      POLYPECTOMY      REDUCTION OF BOTH BREASTS Bilateral     ROBOT-ASSISTED CHOLECYSTECTOMY USING DA [employer] XI  2025    Procedure: XI ROBOTIC CHOLECYSTECTOMY;  Surgeon: Juan Granda MD;  Location: [facility];  Service: General;;    SPINE SURGERY      Disectomy with bone fusion    TONSILLECTOMY  1974    TOTAL REDUCTION MAMMOPLASTY          Family History   Problem Relation Name Age of Onset    Hypertension Mother Marcia Culver     Diabetic kidney disease Mother Clotgurmeet Prasader     Arthritis Mother Clotgurmeet Culver     Diabetes Mother Marcia Culver     Heart disease Mother Marcia Prasader     Stroke Mother Marcia Prasader     Tuberculosis Father      Asbestos Brother      Cancer Brother Manuel prasader      Melanoma Brother Manuel palomo Hernandez     No Known Problems Daughter      Eczema Sister Pat [place]ston        Current Outpatient Medications   Medication Instructions    aMILoride (MIDAMOR) 5 mg, Oral, Daily    carvediloL (COREG) 6.25 mg, Oral, 2 times daily with meals     "estradioL (ESTRACE) 1 g, Vaginal, Twice weekly    estrogens (conjugated) (PREMARIN) 1.25 mg, Daily    glimepiride (AMARYL) 2 mg, Oral, Every morning    HYDROcodone-acetaminophen (NORCO) 7.5-325 mg per tablet 1 tablet, Oral, Every 6 hours PRN    levothyroxine (SYNTHROID) 100 mcg, Oral, Before breakfast    linaCLOtide (LINZESS) 145 mcg, Oral, Before breakfast    LUMIGAN 0.01 % Drop 1 drop, Nightly    pantoprazole (PROTONIX) 40 mg, Oral    pregabalin (LYRICA) 150 mg, Oral, 2 times daily    rosuvastatin (CRESTOR) 10 mg, Oral, Nightly    valsartan (DIOVAN) 160 mg, Oral, Nightly        Objective     /73 (BP Location: Left arm, Patient Position: Sitting)   Pulse 76   Temp 98.2 °F (36.8 °C) (Oral)   Resp 18   Ht 5' 7" (1.702 m)   Wt 75.6 kg (166 lb 9.6 oz)   SpO2 98%   BMI 26.09 kg/m²     Physical Exam  Vitals and nursing note reviewed.   Constitutional:       Appearance: Normal appearance. She is normal weight.   HENT:      Head: Normocephalic and atraumatic.      Right Ear: Tympanic membrane and ear canal normal.      Left Ear: Tympanic membrane and ear canal normal.      Nose: Nose normal.      Mouth/Throat:      Mouth: Mucous membranes are moist.      Pharynx: Oropharynx is clear.   Eyes:      Extraocular Movements: Extraocular movements intact.      Conjunctiva/sclera: Conjunctivae normal.      Pupils: Pupils are equal, round, and reactive to light.   Cardiovascular:      Rate and Rhythm: Normal rate and regular rhythm.      Pulses: Normal pulses.      Heart sounds: Normal heart sounds. No murmur heard.  Pulmonary:      Effort: Pulmonary effort is normal. No respiratory distress.      Breath sounds: Normal breath sounds. No wheezing.   Abdominal:      General: Bowel sounds are normal. There is no distension.      Palpations: Abdomen is soft.      Tenderness: There is no abdominal tenderness. There is no guarding.   Musculoskeletal:      Cervical back: Normal range of motion and neck supple.   Skin:     " Capillary Refill: Capillary refill takes less than 2 seconds.   Neurological:      General: No focal deficit present.      Mental Status: She is alert and oriented to person, place, and time. Mental status is at baseline.   Psychiatric:         Mood and Affect: Mood normal.         Behavior: Behavior normal.         Thought Content: Thought content normal.         Judgment: Judgment normal.          All recently obtained labs have been reviewed and discussed in detail with the patient.   Assessment     1. S/P cholecystectomy    2. Type 2 diabetes mellitus without complication, without long-term current use of insulin    3. Dietary counseling         Plan     Problem List Items Addressed This Visit       Dietary counseling    Lifestyle Nutrition   The American College of Lifestyle Medicine recommends an eating plan based largely on a variety of minimally processed vegetables, fruits, whole grains, legumes, nuts and seeds. Eating whole plant foods is a great way to get in more nutrition with less harm and is one of the best ways to prevent, treat and even reverse many chronic diseases.      Eat Plenty    Vegetables: Leafy vegetables (kale, spinach, haleigh, swiss chard, raheem greens, cabbage), garlic, onions, peppers (all kinds), leeks, parsnips, potatoes (all kinds), radishes, turnips, squash, green beans, tomatoes, carrots, corn, peas, cauliflower, broccoli, cucumbers, eggplant, mushrooms.   Fruits: Bananas, apples, kiwi, oranges, blackberries, strawberries, raspberries, blueberries, christine, cantaloupe, watermelon, honeydew, plums, pineapple.   Legumes: Black beans, kidney beans, dodson beans, garbanzo beans, cannellini beans, lentils, lima beans, broad beans, soybeans.   Whole Grains: Quinoa, brown rice, oats, barley, wild rice, black rice, whole grain tortillas/pasta/breads, couscous, teff, wheat germ.   Nuts: lmonds, peanuts, pistachios, cashews, brazil nuts, soy nuts, hazelnuts, walnuts.   Seeds: Scott seed, flax  seed, hemp seed, pumpkin seed, sunflower seed.     Limit/Avoid    Sugary drinks like soda, juice cocktails, coffee and energy drinks    Processed meats like sausage, bhatia, salami, bologna, deli meat    Processed snacks like crackers, chips, pretzels    Cakes, pastries, sweets    Dairy (especially high-fat types with added salt and sugar)    Red meats    Poultry    Eggs      Nutrition Goals  Setting goals to improve your eating habits is a great way to eat healthier. An example of a positive nutrition goal is, I will add 1 cup of berries to breakfast and a small apple or orange as an afternoon snack at least five days this week.         Diabetes mellitus, type 2    - monitor blood glucose regularly  - watch out for hypoglycemic/hyperglycemic signs/symptoms   -wear a alert bracelet or necklace, keep a snack all the time  - importance of compliance with diabetic medications and glucose monitoring was dicussed with the patient  Lab Results   Component Value Date    HGBA1C 5.5 02/17/2025     - contemplating on discontinuing Amaryl  - follow up appointment in 3 month           S/P cholecystectomy - Primary    NO POST OP COMPLICATIONS              All orders:          Follow up in about 3 months (around 7/28/2025).    Vickie Buckley MD MPH

## 2025-04-30 NOTE — ASSESSMENT & PLAN NOTE
- monitor blood glucose regularly  - watch out for hypoglycemic/hyperglycemic signs/symptoms   -wear a alert bracelet or necklace, keep a snack all the time  - importance of compliance with diabetic medications and glucose monitoring was dicussed with the patient  Lab Results   Component Value Date    HGBA1C 5.5 02/17/2025     - contemplating on discontinuing Amaryl  - follow up appointment in 3 month

## 2025-05-06 ENCOUNTER — OFFICE VISIT (OUTPATIENT)
Dept: SURGERY | Facility: CLINIC | Age: 70
End: 2025-05-06
Attending: SURGERY
Payer: MEDICARE

## 2025-05-06 DIAGNOSIS — Z09 FOLLOW-UP EXAMINATION, FOLLOWING OTHER SURGERY: Primary | ICD-10-CM

## 2025-05-06 PROCEDURE — 1159F MED LIST DOCD IN RCRD: CPT | Mod: CPTII,,, | Performed by: SURGERY

## 2025-05-06 PROCEDURE — 4010F ACE/ARB THERAPY RXD/TAKEN: CPT | Mod: CPTII,,, | Performed by: SURGERY

## 2025-05-06 PROCEDURE — 99999 PR PBB SHADOW E&M-EST. PATIENT-LVL III: CPT | Mod: PBBFAC,,, | Performed by: SURGERY

## 2025-05-06 PROCEDURE — 3044F HG A1C LEVEL LT 7.0%: CPT | Mod: CPTII,,, | Performed by: SURGERY

## 2025-05-06 PROCEDURE — 99024 POSTOP FOLLOW-UP VISIT: CPT | Mod: ,,, | Performed by: SURGERY

## 2025-05-06 PROCEDURE — 3066F NEPHROPATHY DOC TX: CPT | Mod: CPTII,,, | Performed by: SURGERY

## 2025-05-06 PROCEDURE — 99213 OFFICE O/P EST LOW 20 MIN: CPT | Mod: PBBFAC | Performed by: SURGERY

## 2025-05-06 NOTE — PROGRESS NOTES
Post-operative Note    HPI:  The patient is status post lap choly 2 weeks ago.  Feeling much better overall.  Eating better no nausea or vomiting.  Little bit of diarrhea about 20 minutes after she eats but that is also getting slowly better.    PHYSICAL EXAM:    Incisions well healed clean dry intact    Recent Results (from the past 3 weeks)   POCT glucose    Collection Time: 04/21/25  8:09 AM   Result Value Ref Range    POC Glucose 110 (H) 70 - 105 mg/dL   Surgical Pathology    Collection Time: 04/21/25 10:22 AM   Result Value Ref Range    Case Report       Surgical Pathology                                Case: [id number]                                   Authorizing Provider:  Juan Granda MD       Collected:           04/21/2025 10:22 AM          Ordering Location:     [facility] -     Received:            04/21/2025 11:48 AM                                 [facility]                                                              Pathologist:           Corky Zacarias MD                                                           Specimen:    Gallbladder                                                                                Final Diagnosis       A. Gallbladder, cholecystectomy:  Mild chronic cholecystitis      Gross Description       A. Gallbladder:   The specimen is received in formalin designated Gallbladder and consists of an unopened gallbladder that measures 9.0 x 2.9 x 2.6 cm.  The serosal surface is pink-tan to green in smooth.  The cystic duct is occluded by a plastic clip and measures 0.3 x 0.2 cm.  The mucosa is red-brown and velvety.  The wall measures up to 0.1 cm in thickness.  No stones are found within the gallbladder or specimen container.  No other masses or lesions are identified.  Representative sections are submitted in cassette A1.    Grossing was completed by Ye Roman.       Microscopic Description       A microscopic examination was performed and the diagnosis reflects the findings.          Laboratory Notes       If this report includes immunohistochemical (IHC) test results, please note the following: IHC studies were interpreted in conjunction with appropriate positive and negative controls which demonstrate the expected positive and negative reactivity. This laboratory is regulated under CLIA as qualified to perform high-complexity testing. IHC tests are used for clinical purposes. They should not be regarded as investigational or research.       POCT glucose    Collection Time: 04/21/25 10:54 AM   Result Value Ref Range    POC Glucose 117 (H) 70 - 105 mg/dL        ASSESSMENT:      The patient is doing well after surgery.       PLAN:      Follow up PRN.

## 2025-06-19 DIAGNOSIS — Z79.890 POSTMENOPAUSAL HORMONE REPLACEMENT THERAPY: Primary | ICD-10-CM

## 2025-06-20 DIAGNOSIS — Z79.890 POSTMENOPAUSAL HORMONE REPLACEMENT THERAPY: ICD-10-CM

## 2025-06-30 ENCOUNTER — OFFICE VISIT (OUTPATIENT)
Dept: FAMILY MEDICINE | Facility: CLINIC | Age: 70
End: 2025-06-30
Payer: MEDICARE

## 2025-06-30 VITALS
HEART RATE: 73 BPM | OXYGEN SATURATION: 98 % | RESPIRATION RATE: 16 BRPM | SYSTOLIC BLOOD PRESSURE: 103 MMHG | HEIGHT: 67 IN | BODY MASS INDEX: 26.06 KG/M2 | DIASTOLIC BLOOD PRESSURE: 62 MMHG | WEIGHT: 166 LBS | TEMPERATURE: 98 F

## 2025-06-30 DIAGNOSIS — K59.00 CONSTIPATION, UNSPECIFIED CONSTIPATION TYPE: Primary | ICD-10-CM

## 2025-06-30 PROCEDURE — 99213 OFFICE O/P EST LOW 20 MIN: CPT | Mod: GC,,, | Performed by: FAMILY MEDICINE

## 2025-06-30 PROCEDURE — 3078F DIAST BP <80 MM HG: CPT | Mod: ,,, | Performed by: FAMILY MEDICINE

## 2025-06-30 PROCEDURE — 3074F SYST BP LT 130 MM HG: CPT | Mod: ,,, | Performed by: FAMILY MEDICINE

## 2025-06-30 PROCEDURE — 4010F ACE/ARB THERAPY RXD/TAKEN: CPT | Mod: ,,, | Performed by: FAMILY MEDICINE

## 2025-06-30 PROCEDURE — 3044F HG A1C LEVEL LT 7.0%: CPT | Mod: ,,, | Performed by: FAMILY MEDICINE

## 2025-06-30 PROCEDURE — 3066F NEPHROPATHY DOC TX: CPT | Mod: ,,, | Performed by: FAMILY MEDICINE

## 2025-06-30 PROCEDURE — 1160F RVW MEDS BY RX/DR IN RCRD: CPT | Mod: ,,, | Performed by: FAMILY MEDICINE

## 2025-06-30 PROCEDURE — 1159F MED LIST DOCD IN RCRD: CPT | Mod: ,,, | Performed by: FAMILY MEDICINE

## 2025-06-30 PROCEDURE — 3008F BODY MASS INDEX DOCD: CPT | Mod: ,,, | Performed by: FAMILY MEDICINE

## 2025-06-30 NOTE — PROGRESS NOTES
Vickie Buckley MD MPH  [address], [place] [address]  Phone: [phone]     Subjective     Name: Amina Israel   YOB: 1955 (70 y.o.)  MRN: [id number]  Visit Date: 6/30/2025   Chief Complaint: Constipation        HISTORY OF PRESENT ILLNESS:  The patient presented with recurrent episodes of constipation despite being on Linzess. The patient reported experiencing recurrent episodes of constipation. The patient mentioned taking Miralax, aiming for twice daily, but noted it only helped if taken before symptoms become severe. The patient has been consuming foods like fried chicken (oven-fried), grapes, nectarines, watermelon, cantaloupes, and Eileen cheesesteak with light cheese. The patient does not report any worsening of constipation directly linked to fatty foods, despite having a history of gallbladder removal. The patient noted not taking Linzess daily and sought advice on improving constipation management. The patient has diabetes, which is currently well-controlled, which may have previously affected gut motility. The patient had a colonoscopy in March of the current year, with findings of hemorrhoids and polyps, and is due for the next colonoscopy in three years. The patient had a history of hiatal hernia as per EGD results.   The patient's recurrent constipation despite Linzess use is likely due to insufficient dietary fiber intake and irregular use of Miralax. The patient's consumption of certain foods and lack of hydration may contribute to symptoms. Recommendations for fiber intake and hydration were discussed.   The patient's diabetes, though well-controlled, may have previously impacted gut motility, contributing to constipation. Improvement is expected with continued control of blood sugar levels.            PAST MEDICAL HISTORY:  Significant Diagnoses - Patient  has a past medical history of Arthritis (2013), Cancer (2/10/2010), Crohn's disease (2012), Diabetes mellitus, type 2,  GERD (gastroesophageal reflux disease), History of thyroid cancer (), Hyperlipidemia, Hypertension, Hypothyroidism, Immune disorder (), Neuropathy, Obesity, Other and unspecified hyperlipidemia, Spinal stenosis, and Spondylolisthesis.  Medications - Patient has a current medication list which includes the following long-term medication(s): amiloride, carvedilol, estradiol, estrogens (conjugated), glimepiride, levothyroxine, lumigan, pantoprazole, pregabalin, rosuvastatin, and valsartan.   Allergies - Patient is allergic to corticosteroids (glucocorticoids), metformin, and phenergan plain.  Surgeries - Patient  has a past surgical history that includes Tonsillectomy (); Hysterectomy (); Reduction of both breasts (Bilateral, ); Cervical discectomy (); Foot surgery (Bilateral, ); Polypectomy; Oophorectomy; Total Reduction Mammoplasty; Spine surgery (); Cosmetic surgery (); Breast surgery ();  section (); and Robot-assisted cholecystectomy using da Godwin Xi (2025).  Family History - Patient family history includes Arthritis in her mother; Asbestos in her brother; Cancer in her brother; Diabetes in her mother; Diabetic kidney disease in her mother; Eczema in her sister; Heart disease in her mother; Hypertension in her mother; Melanoma in her brother; No Known Problems in her daughter; Stroke in her mother; Tuberculosis in her father.      SOCIAL HISTORY:  Tobacco - Patient  reports that she has never smoked. She has never used smokeless tobacco.   Alcohol - Patient  reports no history of alcohol use.   Recreational Drugs - Patient  reports no history of drug use.       Review of Systems   Constitutional:  Negative for activity change, appetite change, chills, fatigue and fever.   HENT:  Negative for nasal congestion, ear discharge, ear pain, hearing loss, postnasal drip, rhinorrhea, sinus pressure/congestion and sore throat.    Eyes:  Negative for discharge,  itching and visual disturbance.   Respiratory:  Negative for cough, choking, chest tightness, shortness of breath and wheezing.    Cardiovascular:  Negative for chest pain, palpitations, leg swelling and claudication.   Gastrointestinal:  Positive for constipation. Negative for abdominal distention, abdominal pain, diarrhea, nausea, vomiting and reflux.   Genitourinary:  Negative for difficulty urinating, dysuria, frequency, hematuria and urgency.   Musculoskeletal:  Negative for arthralgias, joint swelling and myalgias.   Integumentary:  Negative for rash.   Neurological:  Negative for tremors, light-headedness, numbness and headaches.          Past Medical History:   Diagnosis Date    Arthritis 2013    Cancer 2/10/2010    Pipulary throid cancer    Crohn's disease     Diabetes mellitus, type 2     GERD (gastroesophageal reflux disease)     History of thyroid cancer     Hyperlipidemia     Hypertension     Hypothyroidism     Immune disorder 2010    Neuropathy     Obesity     Other and unspecified hyperlipidemia     Spinal stenosis     Spondylolisthesis         Review of patient's allergies indicates:   Allergen Reactions    Corticosteroids (glucocorticoids)      Steroid injections    Metformin Diarrhea    Phenergan plain Nausea And Vomiting        Past Surgical History:   Procedure Laterality Date    BREAST SURGERY      Disecctomy/breast reduction    CERVICAL DISCECTOMY      C2-3     SECTION      1987    COSMETIC SURGERY  2006    Breast reduction    FOOT SURGERY Bilateral 2001    HYSTERECTOMY  2003    OOPHORECTOMY      POLYPECTOMY      REDUCTION OF BOTH BREASTS Bilateral     ROBOT-ASSISTED CHOLECYSTECTOMY USING DA TRISHA XI  2025    Procedure: XI ROBOTIC CHOLECYSTECTOMY;  Surgeon: Juan Granda MD;  Location: [facility];  Service: General;;    SPINE SURGERY      Disectomy with bone fusion    TONSILLECTOMY  1974    TOTAL REDUCTION MAMMOPLASTY          Family History  "  Problem Relation Name Age of Onset    Hypertension Mother Marcia Culver     Diabetic kidney disease Mother Marcia Culver     Arthritis Mother Marcia Culver     Diabetes Mother Marcia Culver     Heart disease Mother Marcia Culver     Stroke Mother Marcia Culver     Tuberculosis Father      Asbestos Brother      Cancer Brother Manuel culver Jr     Melanoma Brother Manuel culver Jr     No Known Problems Daughter      Eczema Sister Pat Mera        Current Outpatient Medications   Medication Instructions    aMILoride (MIDAMOR) 5 mg, Oral, Daily    carvediloL (COREG) 6.25 mg, Oral, 2 times daily with meals    estradioL (ESTRACE) 1 g, Vaginal, Twice weekly    estrogens (conjugated) (PREMARIN) 1.25 mg, Oral, Daily    glimepiride (AMARYL) 2 mg, Oral, Every morning    levothyroxine (SYNTHROID) 100 mcg, Oral, Before breakfast    linaCLOtide (LINZESS) 145 mcg, Oral, Before breakfast    LUMIGAN 0.01 % Drop 1 drop, Nightly    pantoprazole (PROTONIX) 40 mg, Oral    pregabalin (LYRICA) 150 mg, Oral, 2 times daily    rosuvastatin (CRESTOR) 10 mg, Oral, Nightly    valsartan (DIOVAN) 160 mg, Oral, Nightly        Objective     /62 (BP Location: Left arm, Patient Position: Sitting)   Pulse 73   Temp 98.3 °F (36.8 °C) (Oral)   Resp 16   Ht 5' 7" (1.702 m)   Wt 75.3 kg (166 lb)   SpO2 98%   BMI 26.00 kg/m²     Physical Exam  Vitals and nursing note reviewed.   Constitutional:       Appearance: Normal appearance. She is normal weight.   HENT:      Head: Normocephalic and atraumatic.      Right Ear: Tympanic membrane and ear canal normal.      Left Ear: Tympanic membrane and ear canal normal.      Nose: Nose normal.      Mouth/Throat:      Mouth: Mucous membranes are moist.      Pharynx: Oropharynx is clear.   Eyes:      Extraocular Movements: Extraocular movements intact.      Conjunctiva/sclera: Conjunctivae normal.      Pupils: Pupils are equal, round, and reactive to light.   Cardiovascular:      Rate " and Rhythm: Normal rate and regular rhythm.      Pulses: Normal pulses.      Heart sounds: Normal heart sounds. No murmur heard.  Pulmonary:      Effort: Pulmonary effort is normal. No respiratory distress.      Breath sounds: Normal breath sounds. No wheezing.   Abdominal:      General: Bowel sounds are normal. There is no distension.      Palpations: Abdomen is soft.      Tenderness: There is no abdominal tenderness. There is no guarding.   Musculoskeletal:      Cervical back: Normal range of motion and neck supple.   Skin:     Capillary Refill: Capillary refill takes less than 2 seconds.   Neurological:      General: No focal deficit present.      Mental Status: She is alert and oriented to person, place, and time. Mental status is at baseline.   Psychiatric:         Mood and Affect: Mood normal.         Behavior: Behavior normal.         Thought Content: Thought content normal.         Judgment: Judgment normal.          All recently obtained labs have been reviewed and discussed in detail with the patient.   Assessment     1. Constipation, unspecified constipation type         Plan     Problem List Items Addressed This Visit       Constipation - Primary    - monitor for worsening of symptoms, watch out for blood in the stool, straining while defecation, abdominal, or rectal pain.  - incorporate high fiber diet in your daily lifestyle  - maintain adequate hydration  - maintain compliance with constipation management regimen  - follow up if symptoms worsen or fail to improve    - Encouraged regular use of Miralax twice daily alongside Linzess. - Suggested increasing dietary fiber intake with Metamucil and adequate hydration.               All orders:          Follow up in about 2 weeks (around 7/14/2025).    Vickie Buckley MD MPH

## 2025-06-30 NOTE — ASSESSMENT & PLAN NOTE
- monitor for worsening of symptoms, watch out for blood in the stool, straining while defecation, abdominal, or rectal pain.  - incorporate high fiber diet in your daily lifestyle  - maintain adequate hydration  - maintain compliance with constipation management regimen  - follow up if symptoms worsen or fail to improve    - Encouraged regular use of Miralax twice daily alongside Linzess. - Suggested increasing dietary fiber intake with Metamucil and adequate hydration.

## 2025-07-14 ENCOUNTER — OFFICE VISIT (OUTPATIENT)
Dept: FAMILY MEDICINE | Facility: CLINIC | Age: 70
End: 2025-07-14
Payer: MEDICARE

## 2025-07-14 VITALS
RESPIRATION RATE: 19 BRPM | WEIGHT: 165.63 LBS | HEIGHT: 67 IN | BODY MASS INDEX: 26 KG/M2 | DIASTOLIC BLOOD PRESSURE: 81 MMHG | SYSTOLIC BLOOD PRESSURE: 142 MMHG | OXYGEN SATURATION: 99 % | TEMPERATURE: 98 F | HEART RATE: 63 BPM

## 2025-07-14 DIAGNOSIS — Z13.820 ENCOUNTER FOR OSTEOPOROSIS SCREENING IN ASYMPTOMATIC POSTMENOPAUSAL PATIENT: ICD-10-CM

## 2025-07-14 DIAGNOSIS — E78.5 DYSLIPIDEMIA: ICD-10-CM

## 2025-07-14 DIAGNOSIS — Z79.890 POSTMENOPAUSAL HORMONE REPLACEMENT THERAPY: ICD-10-CM

## 2025-07-14 DIAGNOSIS — L73.9 FOLLICULITIS: ICD-10-CM

## 2025-07-14 DIAGNOSIS — Z78.0 ENCOUNTER FOR OSTEOPOROSIS SCREENING IN ASYMPTOMATIC POSTMENOPAUSAL PATIENT: ICD-10-CM

## 2025-07-14 DIAGNOSIS — E11.9 TYPE 2 DIABETES MELLITUS WITHOUT COMPLICATION, WITHOUT LONG-TERM CURRENT USE OF INSULIN: Primary | ICD-10-CM

## 2025-07-14 LAB
CHOLEST SERPL-MCNC: 117 MG/DL
CHOLEST/HDLC SERPL: 2.1 {RATIO}
CREAT UR-MCNC: 113 MG/DL (ref 15–325)
EST. AVERAGE GLUCOSE BLD GHB EST-MCNC: 111 MG/DL
HBA1C MFR BLD HPLC: 5.5 %
HDLC SERPL-MCNC: 55 MG/DL (ref 35–60)
LDLC SERPL CALC-MCNC: 46 MG/DL
MICROALBUMIN UR-MCNC: 0.5 MG/DL
MICROALBUMIN/CREAT RATIO PNL UR: 4.4 MG/G (ref 0–30)
NONHDLC SERPL-MCNC: 62 MG/DL
TRIGL SERPL-MCNC: 79 MG/DL (ref 37–140)
VLDLC SERPL-MCNC: 16 MG/DL

## 2025-07-14 PROCEDURE — 1126F AMNT PAIN NOTED NONE PRSNT: CPT | Mod: ,,, | Performed by: FAMILY MEDICINE

## 2025-07-14 PROCEDURE — 3066F NEPHROPATHY DOC TX: CPT | Mod: ,,, | Performed by: FAMILY MEDICINE

## 2025-07-14 PROCEDURE — 99213 OFFICE O/P EST LOW 20 MIN: CPT | Mod: GE,,, | Performed by: FAMILY MEDICINE

## 2025-07-14 PROCEDURE — 3288F FALL RISK ASSESSMENT DOCD: CPT | Mod: ,,, | Performed by: FAMILY MEDICINE

## 2025-07-14 PROCEDURE — 3008F BODY MASS INDEX DOCD: CPT | Mod: ,,, | Performed by: FAMILY MEDICINE

## 2025-07-14 PROCEDURE — 80061 LIPID PANEL: CPT | Mod: ,,, | Performed by: CLINICAL MEDICAL LABORATORY

## 2025-07-14 PROCEDURE — 3077F SYST BP >= 140 MM HG: CPT | Mod: ,,, | Performed by: FAMILY MEDICINE

## 2025-07-14 PROCEDURE — 1159F MED LIST DOCD IN RCRD: CPT | Mod: ,,, | Performed by: FAMILY MEDICINE

## 2025-07-14 PROCEDURE — 82570 ASSAY OF URINE CREATININE: CPT | Mod: ,,, | Performed by: CLINICAL MEDICAL LABORATORY

## 2025-07-14 PROCEDURE — 83036 HEMOGLOBIN GLYCOSYLATED A1C: CPT | Mod: ,,, | Performed by: CLINICAL MEDICAL LABORATORY

## 2025-07-14 PROCEDURE — 4010F ACE/ARB THERAPY RXD/TAKEN: CPT | Mod: ,,, | Performed by: FAMILY MEDICINE

## 2025-07-14 PROCEDURE — 1160F RVW MEDS BY RX/DR IN RCRD: CPT | Mod: ,,, | Performed by: FAMILY MEDICINE

## 2025-07-14 PROCEDURE — 1101F PT FALLS ASSESS-DOCD LE1/YR: CPT | Mod: ,,, | Performed by: FAMILY MEDICINE

## 2025-07-14 PROCEDURE — 3079F DIAST BP 80-89 MM HG: CPT | Mod: ,,, | Performed by: FAMILY MEDICINE

## 2025-07-14 PROCEDURE — 82043 UR ALBUMIN QUANTITATIVE: CPT | Mod: ,,, | Performed by: CLINICAL MEDICAL LABORATORY

## 2025-07-14 PROCEDURE — 3044F HG A1C LEVEL LT 7.0%: CPT | Mod: ,,, | Performed by: FAMILY MEDICINE

## 2025-07-14 RX ORDER — VALSARTAN 160 MG/1
160 TABLET ORAL NIGHTLY
Qty: 90 TABLET | Refills: 1 | Status: SHIPPED | OUTPATIENT
Start: 2025-07-14

## 2025-07-14 RX ORDER — AMILORIDE HYDROCHLORIDE 5 MG/1
5 TABLET ORAL DAILY
Qty: 30 TABLET | Refills: 1 | Status: SHIPPED | OUTPATIENT
Start: 2025-07-14

## 2025-07-14 NOTE — ASSESSMENT & PLAN NOTE
Lab Results   Component Value Date    HGBA1C 5.5 02/17/2025     - monitor blood glucose regularly  - watch out for hypoglycemic/hyperglycemic signs/symptoms   -wear a alert bracelet or necklace, keep a snack all the time  - importance of compliance with diabetic medications and glucose monitoring was dicussed with the patient   - contemplating on discontinuing Amaryl  - follow up appointment in 3 month

## 2025-07-14 NOTE — PROGRESS NOTES
"  Vickie Buckley MD MPH  [address], [place] [address]  Phone: [phone]     Subjective     Name: Amina Israel   YOB: 1955 (70 y.o.)  MRN: [id number]  Visit Date: 7/14/2025   Chief Complaint: Medication Refill (Pt present to clinic med refills, pt stated constipation has improved from last visit. )        HISTORY OF PRESENT ILLNESS:  Patient is a 70-year-old female with a past medical history of diabetes, hypertension, chronic pain disorder, as well as polyarthralgia, osteoarthritis, and constipation. The patient came to the clinic for a follow-up appointment. The patient was seen in the office on June 30, 2025. During that encounter, the patient was started on Miralax. The patient has an upcoming appointment with gastroenterology nurse practitioner Kesha Padron on September 22, 2025, as well as a mammogram scheduled on August 6, 2025. The patient's previous labs were reviewed before the appointment, relevant medications were refilled, and relevant blood work and urine lab work were ordered. The patient would like to have a list of the information provided.   During last visit she complained about constipation. Miralax was added to her regimen and it improved her constipation. She is also ,making dietary changes to help her constipation as well as GERD.  She complained of folliculitis in the pelvic area. She has history of recurrent "boils". Denied fever, purulent discharge or pain at the site. Examination revealed swelling around the hair follicle. No tenderness, redness, discharge, or bleeding observed.  I & D scheduled for 7/22. Instructed to do scrubbing and warm compressions.          PAST MEDICAL HISTORY:  Significant Diagnoses - Patient  has a past medical history of Arthritis (2013), Cancer (2/10/2010), Crohn's disease (2012), Diabetes mellitus, type 2, GERD (gastroesophageal reflux disease), History of thyroid cancer (2005), Hyperlipidemia, Hypertension, Hypothyroidism, Immune " disorder (), Neuropathy, Obesity, Other and unspecified hyperlipidemia, Spinal stenosis, and Spondylolisthesis.  Medications - Patient has a current medication list which includes the following long-term medication(s): amiloride, carvedilol, estradiol, estrogens (conjugated), estrogens (conjugated), glimepiride, levothyroxine, lumigan, pantoprazole, pregabalin, rosuvastatin, and valsartan.   Allergies - Patient is allergic to corticosteroids (glucocorticoids), metformin, and phenergan plain.  Surgeries - Patient  has a past surgical history that includes Tonsillectomy (); Hysterectomy (); Reduction of both breasts (Bilateral, ); Cervical discectomy (); Foot surgery (Bilateral, ); Polypectomy; Oophorectomy; Total Reduction Mammoplasty; Spine surgery (); Cosmetic surgery (); Breast surgery ();  section (); and Robot-assisted cholecystectomy using da Godwin Xi (2025).  Family History - Patient family history includes Arthritis in her mother; Asbestos in her brother; Cancer in her brother; Diabetes in her mother; Diabetic kidney disease in her mother; Eczema in her sister; Heart disease in her mother; Hypertension in her mother; Melanoma in her brother; No Known Problems in her daughter; Stroke in her mother; Tuberculosis in her father.      SOCIAL HISTORY:  Tobacco - Patient  reports that she has never smoked. She has never used smokeless tobacco.   Alcohol - Patient  reports no history of alcohol use.   Recreational Drugs - Patient  reports no history of drug use.       Review of Systems   Constitutional:  Negative for activity change, appetite change, chills, fatigue and fever.   HENT:  Negative for nasal congestion, ear discharge, ear pain, hearing loss, postnasal drip, rhinorrhea, sinus pressure/congestion and sore throat.    Eyes:  Negative for discharge, itching and visual disturbance.   Respiratory:  Negative for cough, choking, chest tightness, shortness  of breath and wheezing.    Cardiovascular:  Negative for chest pain, palpitations, leg swelling and claudication.   Gastrointestinal:  Negative for abdominal distention, abdominal pain, constipation, diarrhea, nausea, vomiting and reflux.   Genitourinary:  Negative for difficulty urinating, dysuria, frequency, hematuria and urgency.   Musculoskeletal:  Negative for arthralgias, joint swelling and myalgias.   Integumentary:  Positive for rash.   Neurological:  Negative for tremors, light-headedness, numbness and headaches.          Past Medical History:   Diagnosis Date    Arthritis 2013    Cancer 2/10/2010    Pipulary throid cancer    Crohn's disease     Diabetes mellitus, type 2     GERD (gastroesophageal reflux disease)     History of thyroid cancer     Hyperlipidemia     Hypertension     Hypothyroidism     Immune disorder 2010    Neuropathy     Obesity     Other and unspecified hyperlipidemia     Spinal stenosis     Spondylolisthesis         Review of patient's allergies indicates:   Allergen Reactions    Corticosteroids (glucocorticoids)      Steroid injections    Metformin Diarrhea    Phenergan plain Nausea And Vomiting        Past Surgical History:   Procedure Laterality Date    BREAST SURGERY      Disecctomy/breast reduction    CERVICAL DISCECTOMY      C2-3     SECTION  2025    COSMETIC SURGERY  2006    Breast reduction    FOOT SURGERY Bilateral     HYSTERECTOMY  2003    OOPHORECTOMY      POLYPECTOMY      REDUCTION OF BOTH BREASTS Bilateral     ROBOT-ASSISTED CHOLECYSTECTOMY USING DA TRISHA XI  2025    Procedure: XI ROBOTIC CHOLECYSTECTOMY;  Surgeon: Juan Granda MD;  Location: [facility] OR;  Service: General;;    SPINE SURGERY      Disectomy with bone fusion    TONSILLECTOMY  1974    TOTAL REDUCTION MAMMOPLASTY          Family History   Problem Relation Name Age of Onset    Hypertension Mother Marcia Pinto     Diabetic kidney disease Mother Marcia  "Culver     Arthritis Mother Marcia Culver     Diabetes Mother Marcia Culver     Heart disease Mother Marcia Culver     Stroke Mother Marcia Culver     Tuberculosis Father      Asbestos Brother      Cancer Brother Manuel culver Jr     Melanoma Brother Manuel culver Jr     No Known Problems Daughter      Eczema Sister Pat Mera        Current Outpatient Medications   Medication Instructions    aMILoride (MIDAMOR) 5 mg, Oral, Daily    carvediloL (COREG) 6.25 mg, Oral, 2 times daily with meals    estradioL (ESTRACE) 1 g, Vaginal, Twice weekly    estrogens (conjugated) (PREMARIN) 1.25 mg, Daily    estrogens (conjugated) (PREMARIN) 1.25 mg, Oral, Daily    glimepiride (AMARYL) 2 mg, Oral, Every morning    levothyroxine (SYNTHROID) 100 mcg, Oral, Before breakfast    linaCLOtide (LINZESS) 145 mcg, Oral, Before breakfast    LUMIGAN 0.01 % Drop 1 drop, Nightly    pantoprazole (PROTONIX) 40 mg, Oral    pregabalin (LYRICA) 150 mg, Oral, 2 times daily    rosuvastatin (CRESTOR) 10 mg, Oral, Nightly    valsartan (DIOVAN) 160 mg, Oral, Nightly        Objective     BP (!) 142/81 (BP Location: Left forearm, Patient Position: Sitting)   Pulse 63   Temp 98.3 °F (36.8 °C) (Oral)   Resp 19   Ht 5' 7" (1.702 m)   Wt 75.1 kg (165 lb 9.6 oz)   SpO2 99%   BMI 25.94 kg/m²     Physical Exam  Vitals and nursing note reviewed.   Constitutional:       General: She is not in acute distress.     Appearance: Normal appearance. She is not ill-appearing.   HENT:      Head: Normocephalic and atraumatic.      Right Ear: External ear normal.      Left Ear: External ear normal.      Nose: Nose normal. No congestion or rhinorrhea.      Mouth/Throat:      Mouth: Mucous membranes are moist.      Pharynx: Oropharynx is clear. No oropharyngeal exudate or posterior oropharyngeal erythema.   Eyes:      Extraocular Movements: Extraocular movements intact.      Conjunctiva/sclera: Conjunctivae normal.      Pupils: Pupils are equal, round, and " reactive to light.   Cardiovascular:      Rate and Rhythm: Normal rate and regular rhythm.      Pulses: Normal pulses.      Heart sounds: Normal heart sounds. No murmur heard.  Pulmonary:      Effort: Pulmonary effort is normal. No respiratory distress.      Breath sounds: Normal breath sounds. No wheezing or rales.   Chest:      Chest wall: No tenderness.   Abdominal:      General: Bowel sounds are normal. There is no distension.      Palpations: Abdomen is soft.      Tenderness: There is no abdominal tenderness.   Musculoskeletal:         General: No swelling or tenderness.      Cervical back: Normal range of motion and neck supple.      Right lower leg: No edema.      Left lower leg: No edema.   Skin:     Capillary Refill: Capillary refill takes less than 2 seconds.      Findings: Lesion present. No bruising, erythema or rash.             Comments: swelling around the hair follicle. No tenderness, redness, discharge, or bleeding observed.     Neurological:      General: No focal deficit present.      Mental Status: She is alert.      Sensory: No sensory deficit.   Psychiatric:         Mood and Affect: Mood normal.         Behavior: Behavior normal.         Thought Content: Thought content normal.          All recently obtained labs have been reviewed and discussed in detail with the patient.   Assessment     1. Type 2 diabetes mellitus without complication, without long-term current use of insulin    2. Folliculitis    3. Dyslipidemia    4. Encounter for osteoporosis screening in asymptomatic postmenopausal patient    5. Postmenopausal hormone replacement therapy         Plan     Problem List Items Addressed This Visit       Encounter for osteoporosis screening in asymptomatic postmenopausal patient    Relevant Orders    DXA Bone Density Axial Skeleton 1 or more sites    Dyslipidemia    - patient has a personal/family history of Hyperlipidemia  - maintain healthy life style with dietary restriction such as Low  "calorie, low fat, and low salt diet. Follow DASH diet.   - Lipid panel was ordered/reviewed  - The ASCVD Risk score (Lety DK, et al., 2019) failed to calculate for the following reasons:    The valid total cholesterol range is 130 to 320 mg/dL  - engage in physical exercise for at least 30 minutes a day/5 days a week  - take medications regularly           Relevant Orders    Lipid Panel    Diabetes mellitus, type 2 - Primary    Lab Results   Component Value Date    HGBA1C 5.5 02/17/2025     - monitor blood glucose regularly  - watch out for hypoglycemic/hyperglycemic signs/symptoms   -wear a alert bracelet or necklace, keep a snack all the time  - importance of compliance with diabetic medications and glucose monitoring was dicussed with the patient   - contemplating on discontinuing Amaryl  - follow up appointment in 3 month         Relevant Orders    Hemoglobin A1C    Microalbumin/Creatinine Ratio, Urine    Folliculitis    She complained of folliculitis in the pelvic area. She has history of recurrent "boils". Denied fever, purulent discharge or pain at the site. Examination revealed swelling around the hair follicle. No tenderness, redness, discharge, or bleeding observed.  I & D scheduled for 7/22. Instructed to do scrubbing and warm compressions.         Postmenopausal hormone replacement therapy    Relevant Medications    estrogens, conjugated, (PREMARIN) 1.25 MG tablet         All orders:  Orders Placed This Encounter    DXA Bone Density Axial Skeleton 1 or more sites    Hemoglobin A1C    Microalbumin/Creatinine Ratio, Urine    Lipid Panel    estrogens, conjugated, (PREMARIN) 1.25 MG tablet    valsartan (DIOVAN) 160 MG tablet    aMILoride (MIDAMOR) 5 MG Tab          Follow up in about 8 days (around 7/22/2025).    Vickie Buckley MD MPH       "

## 2025-07-14 NOTE — ASSESSMENT & PLAN NOTE
"She complained of folliculitis in the pelvic area. She has history of recurrent "boils". Denied fever, purulent discharge or pain at the site. Examination revealed swelling around the hair follicle. No tenderness, redness, discharge, or bleeding observed.  I & D scheduled for 7/22. Instructed to do scrubbing and warm compressions.  "

## 2025-07-14 NOTE — ASSESSMENT & PLAN NOTE
- patient has a personal/family history of Hyperlipidemia  - maintain healthy life style with dietary restriction such as Low calorie, low fat, and low salt diet. Follow DASH diet.   - Lipid panel was ordered/reviewed  - The ASCVD Risk score (Lety TURNER, et al., 2019) failed to calculate for the following reasons:    The valid total cholesterol range is 130 to 320 mg/dL  - engage in physical exercise for at least 30 minutes a day/5 days a week  - take medications regularly

## 2025-08-05 ENCOUNTER — PATIENT MESSAGE (OUTPATIENT)
Facility: HOSPITAL | Age: 70
End: 2025-08-05
Payer: MEDICARE

## 2025-08-06 ENCOUNTER — HOSPITAL ENCOUNTER (OUTPATIENT)
Dept: RADIOLOGY | Facility: HOSPITAL | Age: 70
Discharge: HOME | End: 2025-08-06
Attending: INTERNAL MEDICINE
Payer: MEDICARE

## 2025-08-06 ENCOUNTER — HOSPITAL ENCOUNTER (OUTPATIENT)
Dept: RADIOLOGY | Facility: HOSPITAL | Age: 70
Discharge: HOME | End: 2025-08-06
Payer: MEDICARE

## 2025-08-06 VITALS — BODY MASS INDEX: 25.9 KG/M2 | WEIGHT: 165 LBS | HEIGHT: 67 IN

## 2025-08-06 DIAGNOSIS — Z78.0 ENCOUNTER FOR OSTEOPOROSIS SCREENING IN ASYMPTOMATIC POSTMENOPAUSAL PATIENT: ICD-10-CM

## 2025-08-06 DIAGNOSIS — Z13.820 ENCOUNTER FOR OSTEOPOROSIS SCREENING IN ASYMPTOMATIC POSTMENOPAUSAL PATIENT: ICD-10-CM

## 2025-08-06 DIAGNOSIS — Z12.31 VISIT FOR SCREENING MAMMOGRAM: ICD-10-CM

## 2025-08-06 PROCEDURE — 77067 SCR MAMMO BI INCL CAD: CPT | Mod: TC

## 2025-08-06 PROCEDURE — 77080 DXA BONE DENSITY AXIAL: CPT | Mod: TC

## 2025-08-06 PROCEDURE — 77080 DXA BONE DENSITY AXIAL: CPT | Mod: 26,,, | Performed by: NUCLEAR MEDICINE

## 2025-08-07 ENCOUNTER — PATIENT OUTREACH (OUTPATIENT)
Facility: HOSPITAL | Age: 70
End: 2025-08-07
Payer: MEDICARE

## 2025-08-07 VITALS — DIASTOLIC BLOOD PRESSURE: 75 MMHG | SYSTOLIC BLOOD PRESSURE: 127 MMHG

## 2025-08-07 NOTE — PROGRESS NOTES
Population Health Chart Review & Patient Outreach Details      Further Action Needed If Patient Returns Outreach:        Health Maintenance Due   Topic Date Due    RSV Vaccine (Age 60+ and Pregnant patients) (1 - Risk 60-74 years 1-dose series) Never done    Mammogram  2025          Updates Requested / Reviewed:     []  Care Everywhere    [x]     []  External Sources ([facility], [facility], [facility], etc.)    [] [facility]   [] [facility]   [] Other:    [x]  Care Team Updated   []  Removed  or Duplicate Orders   []  Immunization Reconciliation Completed / Queried    [] [place]   [] [place]   [] [place]   [] [place]      Health Maintenance Topics Addressed and Outreach Outcomes / Actions Taken:             Breast Cancer Screening []  Mammogram Order Placed    []  Mammogram Screening Scheduled    []  External Records Requested & Care Team Updated if Applicable    []  External Records Uploaded & Care Team Updated if Applicable    []  Pt Declined Scheduling Mammogram    []  Pt Will Schedule with External Provider / Order Routed & Care Team Updated if Applicable              Cervical Cancer Screening []  Pap Smear Scheduled in Primary Care or OBGYN    []  External Records Requested & Care Team Updated if Applicable       []  External Records Uploaded, Care Team Updated, & History Updated if Applicable    []  Patient Declined Scheduling Pap Smear    []  Patient Will Schedule with External Provider & Care Team Updated if Applicable                  Colorectal Cancer Screening []  Colonoscopy Case Request / Referral / Home Test Order Placed    []  External Records Requested & Care Team Updated if Applicable    []  External Records Uploaded, Care Team Updated, & History Updated if Applicable    []  Patient Declined Completing Colon Cancer Screening    []  Patient Will Schedule with External Provider & Care Team Updated if Applicable    []  Fit Kit Mailed (add the SmartPhrase under additional notes)    []  Reminded  Patient to Complete Home Test                Diabetic Eye Exam []  Eye Exam Screening Order Placed    []  Eye Camera Scheduled or Optometry/Ophthalmology Referral Placed    []  External Records Requested & Care Team Updated if Applicable    []  External Records Uploaded, Care Team Updated, & History Updated if Applicable    []  Patient Declined Scheduling Eye Exam    []  Patient Will Schedule with External Provider & Care Team Updated if Applicable             Blood Pressure Control []  Primary Care Follow Up Visit Scheduled     [x]  Remote Blood Pressure Reading Captured  **127/75**    []  Patient Declined Remote Reading or Scheduling Appt - Escalated to PCP    []  Patient Will Call Back or Send Portal Message with Reading                 HbA1c & Other Labs []  Overdue Lab(s) Ordered    []  Overdue Lab(s) Scheduled    []  External Records Uploaded & Care Team Updated if Applicable    []  Primary Care Follow Up Visit Scheduled     []  Reminded Patient to Complete A1c Home Test    []  Patient Declined Scheduling Labs or Will Call Back to Schedule    []  Patient Will Schedule with External Provider / Order Routed, & Care Team Updated if Applicable           Primary Care Appointment []  Primary Care Appt Scheduled    []  Patient Declined Scheduling or Will Call Back to Schedule    []  Pt Established with External Provider, Updated Care Team, & Informed Pt to Notify Payor if Applicable           Medication Adherence /    Statin Use []  Primary Care Appointment Scheduled    []  Patient Reminded to  Prescription    []  Patient Declined, Provider Notified if Needed    []  Sent Provider Message to Review to Evaluate Pt for Statin, Add Exclusion Dx Codes, Document   Exclusion in Problem List, Change Statin Intensity Level to Moderate or High Intensity if Applicable                Osteoporosis Screening []  Dexa Order Placed    []  Dexa Appointment Scheduled    []  External Records Requested & Care Team Updated    []   External Records Uploaded, Care Team Updated, & History Updated if Applicable    []  Patient Declined Scheduling Dexa or Will Call Back to Schedule    []  Patient Will Schedule with External Provider / Order Routed & Care Team Updated if Applicable       Additional Notes:

## 2025-08-21 DIAGNOSIS — M47.812 CERVICAL SPONDYLOSIS WITHOUT MYELOPATHY: Chronic | ICD-10-CM

## 2025-08-21 DIAGNOSIS — M25.50 POLYARTHRALGIA: Chronic | ICD-10-CM

## 2025-08-25 RX ORDER — PREGABALIN 150 MG/1
150 CAPSULE ORAL 2 TIMES DAILY
Qty: 60 CAPSULE | Refills: 1 | Status: SHIPPED | OUTPATIENT
Start: 2025-08-25

## (undated) DEVICE — Device

## (undated) DEVICE — PAD PINK TRENDELENBURG POS XL

## (undated) DEVICE — GLOVE SENSICARE PI SURG 6.5

## (undated) DEVICE — TROCAR ENDO Z THREAD KII 5X100

## (undated) DEVICE — ADHESIVE MASTISOL VIAL 48/BX

## (undated) DEVICE — SYR 10CC LUER LOCK

## (undated) DEVICE — COVER PROXIMA MAYO STAND

## (undated) DEVICE — OBTURATOR BLADELESS 8MM XI CLR

## (undated) DEVICE — WARMER BLUE HEAT SCOPE 3-12MM

## (undated) DEVICE — STRIP MEDI WND CLSR 1/2X4IN

## (undated) DEVICE — SEAL CANN UNIVERSAL 5-12MM

## (undated) DEVICE — APPLIER MEDIUM LARGE CLIP

## (undated) DEVICE — GLOVE SENSICARE PI SURG 7

## (undated) DEVICE — HOOK PERM MONOPOLAR CAUTERY

## (undated) DEVICE — PROGRASP DA VINCI

## (undated) DEVICE — DRAPE ARM DAVINCI XI

## (undated) DEVICE — SOL NACL IRR 1000ML BTL

## (undated) DEVICE — CANNULA REDUCER 12-8MM

## (undated) DEVICE — GLOVE SENSICARE PI GRN 6.5

## (undated) DEVICE — FORCEP FENESTRATED BIPOLAR

## (undated) DEVICE — BAG TISS RETRV MONARCH 10MM

## (undated) DEVICE — SUT MONOCYRL 4-0 PS2 UND